# Patient Record
Sex: FEMALE | Race: WHITE | NOT HISPANIC OR LATINO | Employment: OTHER | URBAN - METROPOLITAN AREA
[De-identification: names, ages, dates, MRNs, and addresses within clinical notes are randomized per-mention and may not be internally consistent; named-entity substitution may affect disease eponyms.]

---

## 2017-12-04 ENCOUNTER — TRANSCRIBE ORDERS (OUTPATIENT)
Dept: LAB | Facility: HOSPITAL | Age: 82
End: 2017-12-04

## 2017-12-04 ENCOUNTER — LAB (OUTPATIENT)
Dept: LAB | Facility: HOSPITAL | Age: 82
End: 2017-12-04

## 2017-12-04 DIAGNOSIS — R73.9 BLOOD GLUCOSE ELEVATED: ICD-10-CM

## 2017-12-04 DIAGNOSIS — E78.2 MIXED HYPERLIPIDEMIA: ICD-10-CM

## 2017-12-04 DIAGNOSIS — I51.9 MYXEDEMA HEART DISEASE: ICD-10-CM

## 2017-12-04 DIAGNOSIS — E03.9 MYXEDEMA HEART DISEASE: ICD-10-CM

## 2017-12-04 DIAGNOSIS — E03.9 MYXEDEMA HEART DISEASE: Primary | ICD-10-CM

## 2017-12-04 DIAGNOSIS — I10 ESSENTIAL HYPERTENSION, MALIGNANT: ICD-10-CM

## 2017-12-04 DIAGNOSIS — I51.9 MYXEDEMA HEART DISEASE: Primary | ICD-10-CM

## 2017-12-04 LAB
HBA1C MFR BLD: 6.4 % (ref 4.8–5.6)
T4 FREE SERPL-MCNC: 1.29 NG/DL (ref 0.89–1.76)

## 2017-12-04 PROCEDURE — 83036 HEMOGLOBIN GLYCOSYLATED A1C: CPT | Performed by: INTERNAL MEDICINE

## 2017-12-04 PROCEDURE — 84439 ASSAY OF FREE THYROXINE: CPT | Performed by: INTERNAL MEDICINE

## 2017-12-04 PROCEDURE — 84480 ASSAY TRIIODOTHYRONINE (T3): CPT

## 2017-12-04 PROCEDURE — 36415 COLL VENOUS BLD VENIPUNCTURE: CPT | Performed by: INTERNAL MEDICINE

## 2017-12-05 LAB — T3 SERPL-MCNC: 65 NG/DL (ref 71–180)

## 2019-01-25 ENCOUNTER — LAB (OUTPATIENT)
Dept: LAB | Facility: HOSPITAL | Age: 84
End: 2019-01-25

## 2019-01-25 ENCOUNTER — OFFICE VISIT (OUTPATIENT)
Dept: INTERNAL MEDICINE | Facility: CLINIC | Age: 84
End: 2019-01-25

## 2019-01-25 VITALS
WEIGHT: 168.5 LBS | SYSTOLIC BLOOD PRESSURE: 168 MMHG | HEIGHT: 66 IN | HEART RATE: 60 BPM | DIASTOLIC BLOOD PRESSURE: 74 MMHG | BODY MASS INDEX: 27.08 KG/M2

## 2019-01-25 DIAGNOSIS — J44.9 MODERATE COPD (CHRONIC OBSTRUCTIVE PULMONARY DISEASE) (HCC): ICD-10-CM

## 2019-01-25 DIAGNOSIS — E03.9 ACQUIRED HYPOTHYROIDISM: ICD-10-CM

## 2019-01-25 DIAGNOSIS — I25.10 ASHD (ARTERIOSCLEROTIC HEART DISEASE): ICD-10-CM

## 2019-01-25 DIAGNOSIS — E78.00 HYPERCHOLESTEROLEMIA: ICD-10-CM

## 2019-01-25 DIAGNOSIS — K21.9 GASTROESOPHAGEAL REFLUX DISEASE WITHOUT ESOPHAGITIS: ICD-10-CM

## 2019-01-25 DIAGNOSIS — I10 BENIGN ESSENTIAL HYPERTENSION: Primary | ICD-10-CM

## 2019-01-25 DIAGNOSIS — I10 BENIGN ESSENTIAL HYPERTENSION: ICD-10-CM

## 2019-01-25 PROBLEM — M15.9 PRIMARY OSTEOARTHRITIS INVOLVING MULTIPLE JOINTS: Status: ACTIVE | Noted: 2019-01-25

## 2019-01-25 PROBLEM — I25.9 ISCHEMIC HEART DISEASE: Status: ACTIVE | Noted: 2019-01-25

## 2019-01-25 PROBLEM — D64.89 OTHER SPECIFIED ANEMIAS: Status: ACTIVE | Noted: 2019-01-25

## 2019-01-25 PROBLEM — K58.2 IRRITABLE BOWEL SYNDROME WITH CONSTIPATION AND DIARRHEA: Status: ACTIVE | Noted: 2019-01-25

## 2019-01-25 PROBLEM — N30.20 CHRONIC CYSTITIS: Status: ACTIVE | Noted: 2019-01-25

## 2019-01-25 PROBLEM — R54 ADVANCED AGE: Status: ACTIVE | Noted: 2019-01-25

## 2019-01-25 LAB
ALBUMIN SERPL-MCNC: 4.63 G/DL (ref 3.2–4.8)
ALBUMIN/GLOB SERPL: 2 G/DL (ref 1.5–2.5)
ALP SERPL-CCNC: 86 U/L (ref 25–100)
ALT SERPL W P-5'-P-CCNC: 29 U/L (ref 7–40)
ANION GAP SERPL CALCULATED.3IONS-SCNC: 7 MMOL/L (ref 3–11)
ARTICHOKE IGE QN: 104 MG/DL (ref 0–130)
AST SERPL-CCNC: 27 U/L (ref 0–33)
BACTERIA UR QL AUTO: ABNORMAL /HPF
BILIRUB SERPL-MCNC: 0.6 MG/DL (ref 0.3–1.2)
BILIRUB UR QL STRIP: NEGATIVE
BUN BLD-MCNC: 18 MG/DL (ref 9–23)
BUN/CREAT SERPL: 22 (ref 7–25)
CALCIUM SPEC-SCNC: 9.9 MG/DL (ref 8.7–10.4)
CHLORIDE SERPL-SCNC: 103 MMOL/L (ref 99–109)
CHOLEST SERPL-MCNC: 179 MG/DL (ref 0–200)
CLARITY UR: CLEAR
CO2 SERPL-SCNC: 28 MMOL/L (ref 20–31)
COLOR UR: YELLOW
CREAT BLD-MCNC: 0.82 MG/DL (ref 0.6–1.3)
DEPRECATED RDW RBC AUTO: 47 FL (ref 37–54)
ERYTHROCYTE [DISTWIDTH] IN BLOOD BY AUTOMATED COUNT: 12.9 % (ref 11.3–14.5)
GFR SERPL CREATININE-BSD FRML MDRD: 66 ML/MIN/1.73
GLOBULIN UR ELPH-MCNC: 2.3 GM/DL
GLUCOSE BLD-MCNC: 110 MG/DL (ref 70–100)
GLUCOSE UR STRIP-MCNC: NEGATIVE MG/DL
HCT VFR BLD AUTO: 39.3 % (ref 34.5–44)
HDLC SERPL-MCNC: 56 MG/DL (ref 40–60)
HGB BLD-MCNC: 12.9 G/DL (ref 11.5–15.5)
HGB UR QL STRIP.AUTO: NEGATIVE
HYALINE CASTS UR QL AUTO: ABNORMAL /LPF
KETONES UR QL STRIP: NEGATIVE
LEUKOCYTE ESTERASE UR QL STRIP.AUTO: ABNORMAL
MCH RBC QN AUTO: 32.7 PG (ref 27–31)
MCHC RBC AUTO-ENTMCNC: 32.8 G/DL (ref 32–36)
MCV RBC AUTO: 99.7 FL (ref 80–99)
NITRITE UR QL STRIP: NEGATIVE
PH UR STRIP.AUTO: 7 [PH] (ref 5–8)
PLATELET # BLD AUTO: 388 10*3/MM3 (ref 150–450)
PMV BLD AUTO: 10.5 FL (ref 6–12)
POTASSIUM BLD-SCNC: 5 MMOL/L (ref 3.5–5.5)
PROT SERPL-MCNC: 6.9 G/DL (ref 5.7–8.2)
PROT UR QL STRIP: NEGATIVE
RBC # BLD AUTO: 3.94 10*6/MM3 (ref 3.89–5.14)
RBC # UR: ABNORMAL /HPF
REF LAB TEST METHOD: ABNORMAL
SODIUM BLD-SCNC: 138 MMOL/L (ref 132–146)
SP GR UR STRIP: 1.01 (ref 1–1.03)
SQUAMOUS #/AREA URNS HPF: ABNORMAL /HPF
T4 FREE SERPL-MCNC: 1.38 NG/DL (ref 0.89–1.76)
TRIGL SERPL-MCNC: 153 MG/DL (ref 0–150)
TSH SERPL DL<=0.05 MIU/L-ACNC: 0.02 MIU/ML (ref 0.35–5.35)
UROBILINOGEN UR QL STRIP: ABNORMAL
WBC NRBC COR # BLD: 7.55 10*3/MM3 (ref 3.5–10.8)
WBC UR QL AUTO: ABNORMAL /HPF

## 2019-01-25 PROCEDURE — 85027 COMPLETE CBC AUTOMATED: CPT

## 2019-01-25 PROCEDURE — 84439 ASSAY OF FREE THYROXINE: CPT

## 2019-01-25 PROCEDURE — 84443 ASSAY THYROID STIM HORMONE: CPT

## 2019-01-25 PROCEDURE — 80061 LIPID PANEL: CPT

## 2019-01-25 PROCEDURE — 84481 FREE ASSAY (FT-3): CPT

## 2019-01-25 PROCEDURE — 87086 URINE CULTURE/COLONY COUNT: CPT

## 2019-01-25 PROCEDURE — 80053 COMPREHEN METABOLIC PANEL: CPT

## 2019-01-25 PROCEDURE — 81001 URINALYSIS AUTO W/SCOPE: CPT

## 2019-01-25 PROCEDURE — 36415 COLL VENOUS BLD VENIPUNCTURE: CPT

## 2019-01-25 PROCEDURE — G0439 PPPS, SUBSEQ VISIT: HCPCS | Performed by: INTERNAL MEDICINE

## 2019-01-25 RX ORDER — ATORVASTATIN CALCIUM 10 MG/1
10 TABLET, FILM COATED ORAL DAILY
COMMUNITY
End: 2019-01-25

## 2019-01-25 RX ORDER — LIOTHYRONINE SODIUM 5 UG/1
5 TABLET ORAL DAILY
COMMUNITY
End: 2019-05-03 | Stop reason: SDUPTHER

## 2019-01-25 RX ORDER — POTASSIUM CHLORIDE 750 MG/1
5 CAPSULE, EXTENDED RELEASE ORAL DAILY
COMMUNITY
End: 2020-01-27 | Stop reason: SDUPTHER

## 2019-01-25 RX ORDER — ASPIRIN 81 MG/1
81 TABLET ORAL DAILY
COMMUNITY
End: 2019-09-26

## 2019-01-25 RX ORDER — CARVEDILOL 12.5 MG/1
12.5 TABLET ORAL 2 TIMES DAILY WITH MEALS
COMMUNITY
End: 2019-04-16 | Stop reason: SDUPTHER

## 2019-01-25 RX ORDER — DOCUSATE SODIUM 100 MG/1
100 CAPSULE, LIQUID FILLED ORAL DAILY
COMMUNITY
End: 2020-12-17

## 2019-01-25 RX ORDER — GABAPENTIN 300 MG/1
300 CAPSULE ORAL 2 TIMES DAILY
COMMUNITY
End: 2019-02-04 | Stop reason: SDUPTHER

## 2019-01-25 RX ORDER — OCTISALATE, AVOBENZONE, HOMOSALATE, AND OCTOCRYLENE 29.4; 29.4; 49; 25.48 MG/ML; MG/ML; MG/ML; MG/ML
LOTION TOPICAL DAILY
COMMUNITY
End: 2019-09-26

## 2019-01-25 RX ORDER — SPIRONOLACTONE 25 MG/1
25 TABLET ORAL DAILY
COMMUNITY
End: 2019-01-30 | Stop reason: ALTCHOICE

## 2019-01-25 RX ORDER — FUROSEMIDE 20 MG/1
20 TABLET ORAL DAILY
COMMUNITY
End: 2019-11-21 | Stop reason: SDUPTHER

## 2019-01-25 RX ORDER — FUROSEMIDE 20 MG/1
20 TABLET ORAL DAILY
COMMUNITY
End: 2019-01-25

## 2019-01-25 RX ORDER — PANTOPRAZOLE SODIUM 40 MG/1
40 TABLET, DELAYED RELEASE ORAL DAILY
COMMUNITY
End: 2019-08-08 | Stop reason: SDUPTHER

## 2019-01-25 RX ORDER — TRIMETHOPRIM 100 MG/1
100 TABLET ORAL DAILY
COMMUNITY
End: 2019-01-30 | Stop reason: ALTCHOICE

## 2019-01-25 RX ORDER — LEVOTHYROXINE SODIUM 0.07 MG/1
75 TABLET ORAL DAILY
COMMUNITY
End: 2019-07-18 | Stop reason: SDUPTHER

## 2019-01-25 RX ORDER — LOSARTAN POTASSIUM 100 MG/1
100 TABLET ORAL DAILY
COMMUNITY
End: 2019-01-30 | Stop reason: ALTCHOICE

## 2019-01-25 RX ORDER — ATORVASTATIN CALCIUM 40 MG/1
40 TABLET, FILM COATED ORAL DAILY
COMMUNITY
End: 2019-05-15 | Stop reason: SDUPTHER

## 2019-01-25 RX ORDER — FERROUS SULFATE 325(65) MG
325 TABLET ORAL
COMMUNITY
End: 2019-01-30 | Stop reason: ALTCHOICE

## 2019-01-25 NOTE — PROGRESS NOTES
QUICK REFERENCE INFORMATION:  The ABCs of the Annual Wellness Visit    Subsequent Medicare Wellness Visit    HEALTH RISK ASSESSMENT    2/3/1930    Recent Hospitalizations:  No hospitalization(s) within the last year..        Current Medical Providers:  Patient Care Team:  Nj Mckeon MD as PCP - General  Nj Mckeon MD as PCP - Family Medicine        Smoking Status:  Social History     Tobacco Use   Smoking Status Former Smoker   Tobacco Comment    quit at approx age 60       Alcohol Consumption:  Social History     Substance and Sexual Activity   Alcohol Use Yes    Comment: rarely       Depression Screen:   PHQ-2/PHQ-9 Depression Screening 1/25/2019   Little interest or pleasure in doing things 0   Feeling down, depressed, or hopeless 0   Total Score 0       Health Habits and Functional and Cognitive Screening:  No flowsheet data found.        Does the patient have evidence of cognitive impairment? Yes    Aspirin use counseling: Taking ASA appropriately as indicated      Recent Lab Results:  CMP:  Lab Results   Component Value Date    BUN 23 12/01/2015    CREATININE 1.0 12/01/2015     12/01/2015    K 4.1 12/01/2015    CO2 30 12/01/2015    CALCIUM 10.0 12/01/2015    ALBUMIN 4.2 04/27/2016    BILITOT 0.5 04/27/2016    ALKPHOS 82 04/27/2016    AST 20 04/27/2016    ALT 20 04/27/2016     Lipid Panel:  Lab Results   Component Value Date    TRIG 158 (H) 04/27/2016    HDL 52 04/27/2016     HbA1c:  Lab Results   Component Value Date    HGBA1C 6.40 (H) 12/04/2017       Visual Acuity:  No exam data present    Age-appropriate Screening Schedule:  Refer to the list below for future screening recommendations based on patient's age, sex and/or medical conditions. Orders for these recommended tests are listed in the plan section. The patient has been provided with a written plan.    Health Maintenance   Topic Date Due   • TDAP/TD VACCINES (1 - Tdap) 02/03/1949   • ZOSTER VACCINE (1 of 2) 02/03/1980   • INFLUENZA  VACCINE  08/01/2018   • LIPID PANEL  01/25/2019   • DXA SCAN  01/25/2019   • PNEUMOCOCCAL VACCINES (65+ LOW/MEDIUM RISK)  Completed        Subjective   History of Present Illness    Amparo Valdes is a 88 y.o. female who presents for an Subsequent Wellness Visit.    The following portions of the patient's history were reviewed and updated as appropriate: She  has a past medical history of Arthritis, Cancer, skin, squamous cell (12/27/2012), Colon polyps (1987), COPD (chronic obstructive pulmonary disease) (CMS/[facility]), Family history of coronary artery disease, Family history of hyperlipidemia, Family history of hypertension, Family history of stroke, Femoral hernia of right side (12/13/2011), Hyperlipidemia, Hypertension, Hypothyroidism, Osteoarthritis, Osteoporosis, Pneumonia, Rectal fistula, SBO (small bowel obstruction) (CMS/[facility]) (09/13/2012), and Thyroid disease..    Outpatient Medications Prior to Visit   Medication Sig Dispense Refill   • aspirin 81 MG EC tablet Take 81 mg by mouth Daily.     • atorvastatin (LIPITOR) 40 MG tablet Take 40 mg by mouth Daily.     • calcium carbonate-vitamin d (CALTRATE 600+D) 600-400 MG-UNIT per tablet Take 1 tablet by mouth Daily.     • carvedilol (COREG) 12.5 MG tablet Take 12.5 mg by mouth 2 (Two) Times a Day With Meals.     • docusate sodium (COLACE) 100 MG capsule Take 100 mg by mouth 2 (Two) Times a Day As Needed for Constipation.     • ferrous sulfate 325 (65 FE) MG tablet Take 325 mg by mouth Daily With Breakfast. One every Monday, Wednesday and Friday     • furosemide (LASIX) 20 MG tablet Take 20 mg by mouth. TAKE MTW     • gabapentin (NEURONTIN) 300 MG capsule Take 300 mg by mouth 2 (Two) Times a Day.     • levothyroxine (SYNTHROID, LEVOTHROID) 75 MCG tablet Take 75 mcg by mouth Daily.     • liothyronine (CYTOMEL) 5 MCG tablet Take 5 mcg by mouth Daily.     • losartan (COZAAR) 100 MG tablet Take 100 mg by mouth Daily.     • Multiple Vitamins-Minerals (CENTRUM SILVER PO)  Take 1 tablet by mouth Daily.     • O2 (OXYGEN) Inhale Daily As Needed.     • pantoprazole (PROTONIX) 40 MG EC tablet Take 40 mg by mouth Daily.     • polyethyl glycol-propyl glycol (SYSTANE) 0.4-0.3 % solution ophthalmic solution Administer 1 drop to both eyes 2 (Two) Times a Day.     • potassium chloride (MICRO-K) 10 MEQ CR capsule Take 5 mEq by mouth Daily.     • Probiotic Product (ALIGN) 4 MG capsule Take  by mouth Daily.     • spironolactone (ALDACTONE) 25 MG tablet Take 25 mg by mouth Daily.     • trimethoprim (TRIMPEX) 100 MG tablet Take 100 mg by mouth Daily.     • atorvastatin (LIPITOR) 10 MG tablet Take 10 mg by mouth Daily.     • furosemide (LASIX) 20 MG tablet Take 20 mg by mouth Daily.       No facility-administered medications prior to visit.        Patient Active Problem List   Diagnosis   • Irritable bowel syndrome with constipation and diarrhea   • Chronic cystitis   • Benign essential hypertension   • ASHD (arteriosclerotic heart disease)   • Moderate COPD (chronic obstructive pulmonary disease) (CMS/[facility])   • Acquired hypothyroidism   • Hypercholesterolemia   • Other specified anemias   • Primary osteoarthritis involving multiple joints   • Advanced age   • Ischemic heart disease   • Gastroesophageal reflux disease without esophagitis       Advance Care Planning:  has an advance directive - a copy HAS NOT been provided    Identification of Risk Factors:  Risk factors include: none.    Review of Systems   Constitutional: Negative.    HENT: Positive for hearing loss and sinus pressure.    Respiratory: Positive for cough and wheezing.    Cardiovascular: Negative.    Gastrointestinal: Negative.    Endocrine: Negative.    Genitourinary: Positive for urgency.   Musculoskeletal: Positive for joint swelling.   Skin: Negative.    Allergic/Immunologic: Negative.    Neurological: Negative.    Hematological: Negative.    Psychiatric/Behavioral: Negative.        Compared to one year ago, the patient feels her  "physical health is better.  Compared to one year ago, the patient feels her mental health is better.    Objective     Physical Exam   Patient is a 88-year-old female is awake alert and oriented ×3 blood pressure initially 168/74 repeated 140/72 left and right sitting respirations 14 heart rate is 60/m  HEENT clear no facial asymmetry mild hearing loss  Neck without thyromegaly.  Neck pain distention  Chest distant but clear without rales wheezes or rhonchi  Cardiac is regular rhythm with no gallop rub click or murmur  Abdomen is supple without tenderness or masses liver spleen are normal positive bowel sounds   deferred  Extremities warm and dry no edema mild arthritic changes in both knees low back and hips  Skin mild venous stasis lower legs  CNS is intact    Patient Wellness Counseling:   Plan of care reviewed with patient at the conclusion of today's visit. Education was provided in regards to diagnosis, diet and exercise, cervical cancer screening, self breast exams, breast cancer screening, and the importance of yearly mammograms.   Nutrition, family planning/contraception, physical activity, healthy weight,ways to reduce stress, adequate sleep, injury prevention, misuse of tobacco, alcohol and drugs, sexual behavior and STD's, dental health, mental health, and immunizations.    Management and any prescribed or recommended OTC medications.  Patient verbalizes understanding of and agreement with management plan.    .Procdoc   EKG sinus bradycardia with first-degree AV block left axis deviation and LVH.    Vitals:    01/25/19 1018   BP: 168/74   BP Location: Left arm   Pulse: 60   Weight: 76.4 kg (168 lb 8 oz)   Height: 167 cm (65.75\")       Patient's Body mass index is 27.4 kg/m². BMI is within normal parameters. No follow-up required..      Assessment/Plan    #1 essential hypertension on therapy no change  #2 L chronic heart disease with coronary disease remote myocardial infarction with stenting currently " stable without chest pain pressure palpitations  #3 mixed hyperlipidemia stable on Lipitor 40 fasting lab pending  #4 degenerative arthritis disease multiple joints currently stable  #5 hypothyroidism stable on therapy passing lab is pending  #6 GERD  #7 hypoxia on O2    Await fasting lab  EKG discussed  Continue all therapy  See the patient back in 6 months or when necessary.  Patient Self-Management and Personalized Health Advice  The patient has been provided with information about: exercise and preventive services including:   · Fall Risk plan of care done.    Visit Diagnoses:    ICD-10-CM ICD-9-CM   1. Benign essential hypertension I10 401.1   2. ASHD (arteriosclerotic heart disease) I25.10 414.00   3. Hypercholesterolemia E78.00 272.0   4. Moderate COPD (chronic obstructive pulmonary disease) (CMS/[facility]) J44.9 496   5. Gastroesophageal reflux disease without esophagitis K21.9 530.81   6. Acquired hypothyroidism E03.9 244.9       Orders Placed This Encounter   Procedures   • CBC No Differential     Standing Status:   Future     Number of Occurrences:   1     Standing Expiration Date:   1/25/2020   • Comprehensive metabolic panel     Standing Status:   Future     Number of Occurrences:   1     Standing Expiration Date:   1/25/2020   • Lipid panel     Standing Status:   Future     Number of Occurrences:   1     Standing Expiration Date:   1/25/2020   • TSH     Standing Status:   Future     Number of Occurrences:   1     Standing Expiration Date:   1/25/2020   • T3, free     Standing Status:   Future     Number of Occurrences:   1     Standing Expiration Date:   1/25/2020   • T4, free     Standing Status:   Future     Number of Occurrences:   1     Standing Expiration Date:   1/25/2020   • Urinalysis With Culture If Indicated - Urine, Clean Catch     Standing Status:   Future     Number of Occurrences:   1     Standing Expiration Date:   1/25/2020       Outpatient Encounter Medications as of 1/25/2019   Medication  Sig Dispense Refill   • aspirin 81 MG EC tablet Take 81 mg by mouth Daily.     • atorvastatin (LIPITOR) 40 MG tablet Take 40 mg by mouth Daily.     • calcium carbonate-vitamin d (CALTRATE 600+D) 600-400 MG-UNIT per tablet Take 1 tablet by mouth Daily.     • carvedilol (COREG) 12.5 MG tablet Take 12.5 mg by mouth 2 (Two) Times a Day With Meals.     • docusate sodium (COLACE) 100 MG capsule Take 100 mg by mouth 2 (Two) Times a Day As Needed for Constipation.     • ferrous sulfate 325 (65 FE) MG tablet Take 325 mg by mouth Daily With Breakfast. One every Monday, Wednesday and Friday     • furosemide (LASIX) 20 MG tablet Take 20 mg by mouth. TAKE MTW     • gabapentin (NEURONTIN) 300 MG capsule Take 300 mg by mouth 2 (Two) Times a Day.     • levothyroxine (SYNTHROID, LEVOTHROID) 75 MCG tablet Take 75 mcg by mouth Daily.     • liothyronine (CYTOMEL) 5 MCG tablet Take 5 mcg by mouth Daily.     • losartan (COZAAR) 100 MG tablet Take 100 mg by mouth Daily.     • Multiple Vitamins-Minerals (CENTRUM SILVER PO) Take 1 tablet by mouth Daily.     • O2 (OXYGEN) Inhale Daily As Needed.     • pantoprazole (PROTONIX) 40 MG EC tablet Take 40 mg by mouth Daily.     • polyethyl glycol-propyl glycol (SYSTANE) 0.4-0.3 % solution ophthalmic solution Administer 1 drop to both eyes 2 (Two) Times a Day.     • potassium chloride (MICRO-K) 10 MEQ CR capsule Take 5 mEq by mouth Daily.     • Probiotic Product (ALIGN) 4 MG capsule Take  by mouth Daily.     • spironolactone (ALDACTONE) 25 MG tablet Take 25 mg by mouth Daily.     • trimethoprim (TRIMPEX) 100 MG tablet Take 100 mg by mouth Daily.     • [DISCONTINUED] atorvastatin (LIPITOR) 10 MG tablet Take 10 mg by mouth Daily.     • [DISCONTINUED] furosemide (LASIX) 20 MG tablet Take 20 mg by mouth Daily.       No facility-administered encounter medications on file as of 1/25/2019.        Reviewed use of high risk medication in the elderly: yes  Reviewed for potential of harmful drug interactions  in the elderly: yes    Follow Up:  Return in about 6 months (around 7/25/2019) for Recheck.     An After Visit Summary and PPPS with all of these plans were given to the patient.

## 2019-01-25 NOTE — PATIENT INSTRUCTIONS
Discussed diet and exercise  Discussed EKG with no change from previous  Fasting lab is pending  To continue all treatment and therapy including oxygen and medications  See the patient back in 6 months or when necessary

## 2019-01-27 LAB
BACTERIA SPEC AEROBE CULT: NORMAL
T3FREE SERPL-MCNC: 2.6 PG/ML (ref 2–4.4)

## 2019-01-30 ENCOUNTER — TELEPHONE (OUTPATIENT)
Dept: INTERNAL MEDICINE | Facility: CLINIC | Age: 84
End: 2019-01-30

## 2019-01-30 NOTE — TELEPHONE ENCOUNTER
Accurate med list received from patient and our chart is updated.  J[facility] reviewed and approved.  Removed from our med list is: losartan, spironolactone, trimethoprim, and iron.

## 2019-02-04 RX ORDER — GABAPENTIN 300 MG/1
CAPSULE ORAL
Qty: 180 CAPSULE | Refills: 0 | Status: SHIPPED | OUTPATIENT
Start: 2019-02-04 | End: 2019-05-03 | Stop reason: SDUPTHER

## 2019-02-04 NOTE — TELEPHONE ENCOUNTER
Refill on Gabapentin 300mg    Please see is she needs other refills as well.    Send to pharm on file

## 2019-02-13 ENCOUNTER — OFFICE VISIT (OUTPATIENT)
Dept: INTERNAL MEDICINE | Facility: CLINIC | Age: 84
End: 2019-02-13

## 2019-02-13 ENCOUNTER — HOSPITAL ENCOUNTER (OUTPATIENT)
Dept: GENERAL RADIOLOGY | Facility: HOSPITAL | Age: 84
Discharge: HOME | End: 2019-02-13
Admitting: PHYSICIAN ASSISTANT

## 2019-02-13 ENCOUNTER — APPOINTMENT (OUTPATIENT)
Dept: LAB | Facility: HOSPITAL | Age: 84
End: 2019-02-13

## 2019-02-13 VITALS
BODY MASS INDEX: 26.83 KG/M2 | HEART RATE: 60 BPM | WEIGHT: 165 LBS | SYSTOLIC BLOOD PRESSURE: 140 MMHG | TEMPERATURE: 98 F | DIASTOLIC BLOOD PRESSURE: 88 MMHG

## 2019-02-13 DIAGNOSIS — J40 BRONCHITIS: Primary | ICD-10-CM

## 2019-02-13 PROBLEM — I51.9 HEART DISORDER: Status: ACTIVE | Noted: 2019-02-13

## 2019-02-13 PROBLEM — M19.90 OSTEOARTHRITIS: Status: ACTIVE | Noted: 2019-02-13

## 2019-02-13 PROBLEM — N39.0 CHRONIC UTI: Status: ACTIVE | Noted: 2019-02-13

## 2019-02-13 PROBLEM — I25.810 ARTERIOSCLEROSIS OF CORONARY ARTERY BYPASS GRAFT: Status: ACTIVE | Noted: 2019-02-13

## 2019-02-13 PROBLEM — I50.9 CONGENITAL HEART FAILURE (HCC): Status: ACTIVE | Noted: 2019-02-13

## 2019-02-13 PROBLEM — R53.1 ASTHENIA: Status: ACTIVE | Noted: 2019-02-13

## 2019-02-13 PROBLEM — D50.9 IRON DEFICIENCY ANEMIA: Status: ACTIVE | Noted: 2019-02-13

## 2019-02-13 PROBLEM — I50.9 CHF (CONGESTIVE HEART FAILURE): Status: ACTIVE | Noted: 2019-02-13

## 2019-02-13 PROBLEM — G47.50 PARASOMNIA: Status: ACTIVE | Noted: 2019-02-13

## 2019-02-13 PROBLEM — B02.9 HERPES ZOSTER: Status: ACTIVE | Noted: 2019-02-13

## 2019-02-13 PROBLEM — E78.2 MIXED HYPERLIPIDEMIA: Status: ACTIVE | Noted: 2019-02-13

## 2019-02-13 PROBLEM — I44.0 FIRST DEGREE ATRIOVENTRICULAR BLOCK: Status: ACTIVE | Noted: 2019-02-13

## 2019-02-13 PROBLEM — I95.9 LOW BLOOD PRESSURE: Status: ACTIVE | Noted: 2019-02-13

## 2019-02-13 PROBLEM — E87.1 HYPONATREMIA: Status: ACTIVE | Noted: 2019-02-13

## 2019-02-13 PROBLEM — I50.9 CONGENITAL HEART FAILURE: Status: RESOLVED | Noted: 2019-02-13 | Resolved: 2019-02-13

## 2019-02-13 PROBLEM — N34.2 URETHRITIS: Status: ACTIVE | Noted: 2019-02-13

## 2019-02-13 PROBLEM — K41.90 FEMORAL HERNIA: Status: ACTIVE | Noted: 2019-02-13

## 2019-02-13 LAB
BASOPHILS # BLD AUTO: 0.04 10*3/MM3 (ref 0–0.2)
BASOPHILS NFR BLD AUTO: 0.3 % (ref 0–1)
DEPRECATED RDW RBC AUTO: 45.5 FL (ref 37–54)
EOSINOPHIL # BLD AUTO: 0.16 10*3/MM3 (ref 0–0.3)
EOSINOPHIL NFR BLD AUTO: 1.2 % (ref 0–3)
ERYTHROCYTE [DISTWIDTH] IN BLOOD BY AUTOMATED COUNT: 12.8 % (ref 11.3–14.5)
HCT VFR BLD AUTO: 37.9 % (ref 34.5–44)
HGB BLD-MCNC: 12.7 G/DL (ref 11.5–15.5)
IMM GRANULOCYTES # BLD AUTO: 0.03 10*3/MM3 (ref 0–0.05)
IMM GRANULOCYTES NFR BLD AUTO: 0.2 % (ref 0–0.6)
LYMPHOCYTES # BLD AUTO: 0.99 10*3/MM3 (ref 0.6–4.8)
LYMPHOCYTES NFR BLD AUTO: 7.6 % (ref 24–44)
MCH RBC QN AUTO: 32.6 PG (ref 27–31)
MCHC RBC AUTO-ENTMCNC: 33.5 G/DL (ref 32–36)
MCV RBC AUTO: 97.4 FL (ref 80–99)
MONOCYTES # BLD AUTO: 1.55 10*3/MM3 (ref 0–1)
MONOCYTES NFR BLD AUTO: 11.9 % (ref 0–12)
NEUTROPHILS # BLD AUTO: 10.3 10*3/MM3 (ref 1.5–8.3)
NEUTROPHILS NFR BLD AUTO: 78.8 % (ref 41–71)
PLATELET # BLD AUTO: 340 10*3/MM3 (ref 150–450)
PMV BLD AUTO: 10.6 FL (ref 6–12)
RBC # BLD AUTO: 3.89 10*6/MM3 (ref 3.89–5.14)
WBC NRBC COR # BLD: 13.07 10*3/MM3 (ref 3.5–10.8)

## 2019-02-13 PROCEDURE — 99213 OFFICE O/P EST LOW 20 MIN: CPT | Performed by: PHYSICIAN ASSISTANT

## 2019-02-13 PROCEDURE — 71046 X-RAY EXAM CHEST 2 VIEWS: CPT

## 2019-02-13 PROCEDURE — 85025 COMPLETE CBC W/AUTO DIFF WBC: CPT | Performed by: PHYSICIAN ASSISTANT

## 2019-02-13 PROCEDURE — 36415 COLL VENOUS BLD VENIPUNCTURE: CPT | Performed by: PHYSICIAN ASSISTANT

## 2019-02-13 RX ORDER — CEFDINIR 300 MG/1
300 CAPSULE ORAL 2 TIMES DAILY
Qty: 20 CAPSULE | Refills: 0 | Status: SHIPPED | OUTPATIENT
Start: 2019-02-13 | End: 2019-05-20

## 2019-02-13 RX ORDER — TRIMETHOPRIM 100 MG/1
TABLET ORAL
COMMUNITY
End: 2020-01-27 | Stop reason: SDUPTHER

## 2019-02-13 RX ORDER — INHALER, ASSIST DEVICES
1 SPACER (EA) MISCELLANEOUS 4 TIMES DAILY
Qty: 1 DEVICE | Refills: 0 | Status: SHIPPED | OUTPATIENT
Start: 2019-02-13 | End: 2019-05-20

## 2019-02-13 RX ORDER — ALBUTEROL SULFATE 90 UG/1
2 AEROSOL, METERED RESPIRATORY (INHALATION) EVERY 4 HOURS PRN
Qty: 1 INHALER | Refills: 2 | Status: SHIPPED | OUTPATIENT
Start: 2019-02-13 | End: 2019-05-20

## 2019-02-13 RX ORDER — IPRATROPIUM BROMIDE 21 UG/1
SPRAY, METERED NASAL
COMMUNITY
Start: 2015-03-20 | End: 2019-05-20

## 2019-02-13 NOTE — PROGRESS NOTES
Patient Care Team:  Nj Mckeon MD as PCP - General  Nj Mckeon MD as PCP - Family Medicine    Chief Complaint;:   Chief Complaint   Patient presents with   • Cough     started Sunday   • URI     started Sunday   • Nasal Congestion     started Sunday   • Shortness of Breath   • Wheezing        Subjective     HPI    Past Medical History:   Diagnosis Date   • Arthritis    • Cancer, skin, squamous cell 12/27/2012   • Colon polyps 1987    clear 1994   • COPD (chronic obstructive pulmonary disease) (CMS/[facility])    • Family history of coronary artery disease    • Family history of hyperlipidemia    • Family history of hypertension    • Family history of stroke    • Femoral hernia of right side 12/13/2011   • Hyperlipidemia    • Hypertension    • Hypothyroidism    • Osteoarthritis    • Osteoporosis    • Pneumonia    • Rectal fistula     repair   • SBO (small bowel obstruction) (CMS/[facility]) 09/13/2012    Had right inguinal hernia repair   • Sepsis (CMS/HCC) 04/2014    - Drug therapy    • Stress incontinence     A and P  repair   • Thyroid disease        Social History     Socioeconomic History   • Marital status:      Spouse name: Not on file   • Number of children: Not on file   • Years of education: Not on file   • Highest education level: Not on file   Social Needs   • Financial resource strain: Not on file   • Food insecurity - worry: Not on file   • Food insecurity - inability: Not on file   • Transportation needs - medical: Not on file   • Transportation needs - non-medical: Not on file   Occupational History   • Not on file   Tobacco Use   • Smoking status: Former Smoker   • Tobacco comment: quit at approx age 60   Substance and Sexual Activity   • Alcohol use: Yes     Comment: rarely   • Drug use: No   • Sexual activity: Not on file   Other Topics Concern   • Not on file   Social History Narrative   • Not on file       Allergies   Allergen Reactions   • Acetaminophen Unknown (See Comments)     Darvocet-  N100   • Baycol [Cerivastatin] Unknown (See Comments)     unknown   • Ciprofloxacin Unknown (See Comments)     Unknown   • Ciprofloxacin Hcl Unknown (See Comments)     unknown   • Codeine Unknown (See Comments)     unknown   • Fosamax [Alendronate Sodium] Unknown (See Comments)     unknown   • Meperidine Unknown (See Comments)     unknown   • Methadone Unknown (See Comments)     unknown   • Morphine Unknown (See Comments)     unknown   • Naloxone Unknown (See Comments)     unknown   • Propoxyphene Unknown (See Comments)     darvocet-n 100   • Sulfa Antibiotics Unknown (See Comments)     unknown   • Ubidecarenone GI Intolerance   • Zocor [Simvastatin] Unknown (See Comments)     unknown       Review of Systems:     Review of Systems   Constitutional: Positive for fatigue. Negative for chills and fever.   HENT: Positive for congestion and hearing loss.    Respiratory: Positive for cough, chest tightness, shortness of breath and wheezing.    Cardiovascular: Negative for chest pain and palpitations.   Gastrointestinal: Negative for abdominal pain.       Vital Signs  Vitals:    02/13/19 1116   BP: 140/88   BP Location: Left arm   Patient Position: Sitting   Cuff Size: Adult   Pulse: 60   Temp: 98 °F (36.7 °C)   TempSrc: Temporal   Weight: 74.8 kg (165 lb)         Current Outpatient Medications:   •  aspirin 81 MG EC tablet, Take 81 mg by mouth Daily., Disp: , Rfl:   •  atorvastatin (LIPITOR) 40 MG tablet, Take 40 mg by mouth Daily., Disp: , Rfl:   •  calcium carbonate-vitamin d (CALTRATE 600+D) 600-400 MG-UNIT per tablet, Take 1 tablet by mouth Daily., Disp: , Rfl:   •  carvedilol (COREG) 12.5 MG tablet, Take 12.5 mg by mouth 2 (Two) Times a Day With Meals., Disp: , Rfl:   •  docusate sodium (COLACE) 100 MG capsule, Take 100 mg by mouth 2 (Two) Times a Day As Needed for Constipation., Disp: , Rfl:   •  furosemide (LASIX) 20 MG tablet, Take 20 mg by mouth. TAKE MTW, Disp: , Rfl:   •  gabapentin (NEURONTIN) 300 MG capsule,  take 1 capsule by mouth twice a day, Disp: 180 capsule, Rfl: 0  •  ipratropium (ATROVENT) 0.03 % nasal spray, Atrovent 0.03 % nasal spray  Daily, Disp: , Rfl:   •  levothyroxine (SYNTHROID, LEVOTHROID) 75 MCG tablet, Take 75 mcg by mouth Daily., Disp: , Rfl:   •  liothyronine (CYTOMEL) 5 MCG tablet, Take 5 mcg by mouth Daily., Disp: , Rfl:   •  Multiple Vitamins-Minerals (CENTRUM SILVER PO), Take 1 tablet by mouth Daily., Disp: , Rfl:   •  O2 (OXYGEN), Inhale Daily As Needed., Disp: , Rfl:   •  pantoprazole (PROTONIX) 40 MG EC tablet, Take 40 mg by mouth Daily., Disp: , Rfl:   •  polyethyl glycol-propyl glycol (SYSTANE) 0.4-0.3 % solution ophthalmic solution, Administer 1 drop to both eyes 2 (Two) Times a Day., Disp: , Rfl:   •  potassium chloride (MICRO-K) 10 MEQ CR capsule, Take 5 mEq by mouth Daily., Disp: , Rfl:   •  Probiotic Product (ALIGN) 4 MG capsule, Take  by mouth Daily., Disp: , Rfl:   •  trimethoprim (TRIMPEX) 100 MG tablet, trimethoprim 100 mg tablet  TAKE 1 TABLET BY MOUTH EVERY DAY, Disp: , Rfl:     Physical Exam:    Physical Exam    Procedures      Assessment/Plan   Problem List Items Addressed This Visit     None        There are no Patient Instructions on file for this visit.    Plan of care reviewed with patient at the conclusion of today's visit. Education was provided regarding diagnosis, management, and any prescribed or recommended OTC medications.Patient verbalizes understanding of and agreement with management plan.     Carlee Shahid PA-C

## 2019-02-13 NOTE — PROGRESS NOTES
Patient Care Team:  Nj Mckeon MD as PCP - General  Nj Mckeon MD as PCP - Family Medicine    Chief Complaint;:   Chief Complaint   Patient presents with   • Cough     started Sunday   • URI     started Sunday   • Nasal Congestion     started Sunday   • Shortness of Breath   • Wheezing        Subjective     HPI  89-year-old white female presents to the office today with chest congestion, nonproductive cough, and wheezing.  Her symptoms started 3 days ago.  She has had some sore throat, nasal congestion and headache as well.  No fever or chills.  She does use oxygen at home.  She did have red stools this morning but she thinks it's a result of the Robitussin.  She does have a history of anemia.  Past Medical History:   Diagnosis Date   • Arthritis    • Cancer, skin, squamous cell 12/27/2012   • Colon polyps 1987    clear 1994   • COPD (chronic obstructive pulmonary disease) (CMS/[facility])    • Family history of coronary artery disease    • Family history of hyperlipidemia    • Family history of hypertension    • Family history of stroke    • Femoral hernia of right side 12/13/2011   • Hyperlipidemia    • Hypertension    • Hypothyroidism    • Osteoarthritis    • Osteoporosis    • Pneumonia    • Rectal fistula     repair   • SBO (small bowel obstruction) (CMS/[facility]) 09/13/2012    Had right inguinal hernia repair   • Sepsis (CMS/[facility]) 04/2014    - Drug therapy    • Stress incontinence     A and P  repair   • Thyroid disease        Social History     Socioeconomic History   • Marital status:      Spouse name: Not on file   • Number of children: Not on file   • Years of education: Not on file   • Highest education level: Not on file   Social Needs   • Financial resource strain: Not on file   • Food insecurity - worry: Not on file   • Food insecurity - inability: Not on file   • Transportation needs - medical: Not on file   • Transportation needs - non-medical: Not on file   Occupational History   • Not on file    Tobacco Use   • Smoking status: Former Smoker   • Tobacco comment: quit at approx age 60   Substance and Sexual Activity   • Alcohol use: Yes     Comment: rarely   • Drug use: No   • Sexual activity: Not on file   Other Topics Concern   • Not on file   Social History Narrative   • Not on file       Allergies   Allergen Reactions   • Acetaminophen Unknown (See Comments)     Darvocet- N100   • Baycol [Cerivastatin] Unknown (See Comments)     unknown   • Ciprofloxacin Unknown (See Comments)     Unknown   • Ciprofloxacin Hcl Unknown (See Comments)     unknown   • Codeine Unknown (See Comments)     unknown   • Fosamax [Alendronate Sodium] Unknown (See Comments)     unknown   • Meperidine Unknown (See Comments)     unknown   • Methadone Unknown (See Comments)     unknown   • Morphine Unknown (See Comments)     unknown   • Naloxone Unknown (See Comments)     unknown   • Propoxyphene Unknown (See Comments)     darvocet-n 100   • Sulfa Antibiotics Unknown (See Comments)     unknown   • Ubidecarenone GI Intolerance   • Zocor [Simvastatin] Unknown (See Comments)     unknown       Review of Systems:     Review of Systems   Constitutional: Positive for fatigue. Negative for chills and fever.   HENT: Positive for postnasal drip and sore throat.    Respiratory: Positive for cough, shortness of breath and wheezing.    Cardiovascular: Negative.        Vital Signs  Vitals:    02/13/19 1116   BP: 140/88   BP Location: Left arm   Patient Position: Sitting   Cuff Size: Adult   Pulse: 60   Temp: 98 °F (36.7 °C)   TempSrc: Temporal   Weight: 74.8 kg (165 lb)         Current Outpatient Medications:   •  aspirin 81 MG EC tablet, Take 81 mg by mouth Daily., Disp: , Rfl:   •  atorvastatin (LIPITOR) 40 MG tablet, Take 40 mg by mouth Daily., Disp: , Rfl:   •  calcium carbonate-vitamin d (CALTRATE 600+D) 600-400 MG-UNIT per tablet, Take 1 tablet by mouth Daily., Disp: , Rfl:   •  carvedilol (COREG) 12.5 MG tablet, Take 12.5 mg by mouth 2 (Two)  Times a Day With Meals., Disp: , Rfl:   •  docusate sodium (COLACE) 100 MG capsule, Take 100 mg by mouth 2 (Two) Times a Day As Needed for Constipation., Disp: , Rfl:   •  furosemide (LASIX) 20 MG tablet, Take 20 mg by mouth. TAKE MTW, Disp: , Rfl:   •  gabapentin (NEURONTIN) 300 MG capsule, take 1 capsule by mouth twice a day, Disp: 180 capsule, Rfl: 0  •  ipratropium (ATROVENT) 0.03 % nasal spray, Atrovent 0.03 % nasal spray  Daily, Disp: , Rfl:   •  levothyroxine (SYNTHROID, LEVOTHROID) 75 MCG tablet, Take 75 mcg by mouth Daily., Disp: , Rfl:   •  liothyronine (CYTOMEL) 5 MCG tablet, Take 5 mcg by mouth Daily., Disp: , Rfl:   •  Multiple Vitamins-Minerals (CENTRUM SILVER PO), Take 1 tablet by mouth Daily., Disp: , Rfl:   •  O2 (OXYGEN), Inhale Daily As Needed., Disp: , Rfl:   •  pantoprazole (PROTONIX) 40 MG EC tablet, Take 40 mg by mouth Daily., Disp: , Rfl:   •  polyethyl glycol-propyl glycol (SYSTANE) 0.4-0.3 % solution ophthalmic solution, Administer 1 drop to both eyes 2 (Two) Times a Day., Disp: , Rfl:   •  potassium chloride (MICRO-K) 10 MEQ CR capsule, Take 5 mEq by mouth Daily., Disp: , Rfl:   •  Probiotic Product (ALIGN) 4 MG capsule, Take  by mouth Daily., Disp: , Rfl:   •  trimethoprim (TRIMPEX) 100 MG tablet, trimethoprim 100 mg tablet  TAKE 1 TABLET BY MOUTH EVERY DAY, Disp: , Rfl:   •  albuterol sulfate  (90 Base) MCG/ACT inhaler, Inhale 2 puffs Every 4 (Four) Hours As Needed for Wheezing., Disp: 1 inhaler, Rfl: 2  •  cefdinir (OMNICEF) 300 MG capsule, Take 1 capsule by mouth 2 (Two) Times a Day., Disp: 20 capsule, Rfl: 0  •  Spacer/Aero-Holding Chambers (AEROCHAMBER MINI CHAMBER) device, 1 applicator 4 (Four) Times a Day. Needs Aerochamber to use with albuterol inhaler, Disp: 1 Device, Rfl: 0    Physical Exam:    Physical Exam   Constitutional: She is oriented to person, place, and time. She appears well-developed and well-nourished.   HENT:   Head: Normocephalic and atraumatic.   Throat  erythematous   Neck: Normal range of motion. Neck supple.   Cardiovascular: Normal rate, regular rhythm and normal heart sounds.   Pulmonary/Chest: Effort normal.   Diffuse rales with expiratory wheezing   Lymphadenopathy:     She has no cervical adenopathy.   Neurological: She is alert and oriented to person, place, and time.   Nursing note and vitals reviewed.      Procedures      Assessment/Plan   Problem List Items Addressed This Visit     None      Visit Diagnoses     Bronchitis    -  Primary    PA and lateral chest x-ray today.  Check CBC.    Relevant Medications    ipratropium (ATROVENT) 0.03 % nasal spray    albuterol sulfate  (90 Base) MCG/ACT inhaler    Other Relevant Orders    CBC w AUTO Differential    XR Chest PA & Lateral        Patient Instructions   Cefdinir twice a day for 10 days.  PA and lateral chest x-ray today.  She will use albuterol 2 puffs every 4-6 hours as needed.  An AeroChamber was also sent in.  She is to call if not improving in the next 2-3 days and instructed to go to the ER for increased symptoms.    I'll also check a CBC to make sure her blood counts are stable.      Plan of care reviewed with patient at the conclusion of today's visit. Education was provided regarding diagnosis, management, and any prescribed or recommended OTC medications.Patient verbalizes understanding of and agreement with management plan.     Carlee Shahid PA-C

## 2019-02-13 NOTE — PATIENT INSTRUCTIONS
Cefdinir twice a day for 10 days.  PA and lateral chest x-ray today.  She will use albuterol 2 puffs every 4-6 hours as needed.  An AeroChamber was also sent in.  She is to call if not improving in the next 2-3 days and instructed to go to the ER for increased symptoms.    I'll also check a CBC to make sure her blood counts are stable.

## 2019-02-14 NOTE — PROGRESS NOTES
Tell her if symptoms worsen-go to Er.  Hopefully, she will start to feel better in the next 1-2 days

## 2019-04-16 RX ORDER — CARVEDILOL 12.5 MG/1
TABLET ORAL
Qty: 180 TABLET | Refills: 3 | Status: SHIPPED | OUTPATIENT
Start: 2019-04-16 | End: 2019-05-23 | Stop reason: SDUPTHER

## 2019-05-03 ENCOUNTER — TELEPHONE (OUTPATIENT)
Dept: INTERNAL MEDICINE | Facility: CLINIC | Age: 84
End: 2019-05-03

## 2019-05-03 RX ORDER — GABAPENTIN 300 MG/1
300 CAPSULE ORAL 2 TIMES DAILY
Qty: 180 CAPSULE | Refills: 0 | Status: SHIPPED | OUTPATIENT
Start: 2019-05-03 | End: 2019-08-05 | Stop reason: SDUPTHER

## 2019-05-03 RX ORDER — LIOTHYRONINE SODIUM 5 UG/1
5 TABLET ORAL DAILY
Qty: 30 TABLET | Refills: 11 | Status: SHIPPED | OUTPATIENT
Start: 2019-05-03 | End: 2020-01-27 | Stop reason: SDUPTHER

## 2019-05-03 NOTE — TELEPHONE ENCOUNTER
Received refill request fax from pharmacy for there Gabapentin 300mg. One bid    Last refill was 2/4/19 #180 with no refills.  Last office visit 2/13/19 and upcoming 7/25/19  Unable to do [place] at present

## 2019-05-15 RX ORDER — ATORVASTATIN CALCIUM 40 MG/1
40 TABLET, FILM COATED ORAL DAILY
Qty: 90 TABLET | Refills: 3 | Status: SHIPPED | OUTPATIENT
Start: 2019-05-15 | End: 2020-01-27 | Stop reason: SDUPTHER

## 2019-05-20 ENCOUNTER — LAB (OUTPATIENT)
Dept: LAB | Facility: HOSPITAL | Age: 84
End: 2019-05-20

## 2019-05-20 ENCOUNTER — OFFICE VISIT (OUTPATIENT)
Dept: INTERNAL MEDICINE | Facility: CLINIC | Age: 84
End: 2019-05-20

## 2019-05-20 VITALS
SYSTOLIC BLOOD PRESSURE: 142 MMHG | HEART RATE: 80 BPM | DIASTOLIC BLOOD PRESSURE: 80 MMHG | WEIGHT: 160 LBS | BODY MASS INDEX: 25.71 KG/M2 | HEIGHT: 66 IN

## 2019-05-20 DIAGNOSIS — R30.9 URINARY PAIN: ICD-10-CM

## 2019-05-20 DIAGNOSIS — I48.91 ATRIAL FIBRILLATION, UNSPECIFIED TYPE (HCC): ICD-10-CM

## 2019-05-20 DIAGNOSIS — R06.02 SHORTNESS OF BREATH: ICD-10-CM

## 2019-05-20 DIAGNOSIS — R30.9 URINARY PAIN: Primary | ICD-10-CM

## 2019-05-20 LAB
BILIRUB BLD-MCNC: NEGATIVE MG/DL
CLARITY, POC: ABNORMAL
COLOR UR: ABNORMAL
GLUCOSE UR STRIP-MCNC: NEGATIVE MG/DL
KETONES UR QL: NEGATIVE
LEUKOCYTE EST, POC: ABNORMAL
NITRITE UR-MCNC: NEGATIVE MG/ML
PH UR: 5.5 [PH] (ref 5–8)
PROT UR STRIP-MCNC: ABNORMAL MG/DL
RBC # UR STRIP: ABNORMAL /UL
SP GR UR: 1.01 (ref 1–1.03)
UROBILINOGEN UR QL: NORMAL

## 2019-05-20 PROCEDURE — 87088 URINE BACTERIA CULTURE: CPT

## 2019-05-20 PROCEDURE — 87086 URINE CULTURE/COLONY COUNT: CPT

## 2019-05-20 PROCEDURE — 99214 OFFICE O/P EST MOD 30 MIN: CPT | Performed by: NURSE PRACTITIONER

## 2019-05-20 PROCEDURE — 81003 URINALYSIS AUTO W/O SCOPE: CPT | Performed by: NURSE PRACTITIONER

## 2019-05-20 PROCEDURE — 93000 ELECTROCARDIOGRAM COMPLETE: CPT | Performed by: NURSE PRACTITIONER

## 2019-05-20 PROCEDURE — 87186 SC STD MICRODIL/AGAR DIL: CPT

## 2019-05-20 RX ORDER — NITROFURANTOIN 25; 75 MG/1; MG/1
100 CAPSULE ORAL 2 TIMES DAILY
Qty: 6 CAPSULE | Refills: 0 | Status: SHIPPED | OUTPATIENT
Start: 2019-05-20 | End: 2019-05-23

## 2019-05-20 NOTE — PROGRESS NOTES
Amparo Valdes  2/3/1930  [id number]  Patient Care Team:  Nj Mckeon MD as PCP - General  Nj Mckeon MD as PCP - Family Medicine    Amparo Valdes is a pleasant 89 y.o. female who presents for evaluation of Urinary Tract Infection (pain when urinating) and Shortness of Breath    Her daughter, Tamiko, to this visit.    Chief Complaint   Patient presents with   • Urinary Tract Infection     pain when urinating   • Shortness of Breath       HPI:   Dysuria: mid to late last week. Worse in mornings and when also having BM.  No abd pain, n/v/fever.  On daily trimethoprim 100mg for years for prevention.  Urine is thicker.    Dyspnea:  Not new.  Persistent since dx bronchitis in Feb.  Improved but not back to 100%.  Can only walk half amt of steps before stopping to rest compared to previous baseline past few mo.  No mobility aids.    Lying flat during exam detect irrgular heartbeat, will do EKG, sounds reg sitting upright, dyspnea worse lying flat.  She uses o2 HS and prn day (more recently)    Past Medical History:   Diagnosis Date   • Arthritis    • Cancer, skin, squamous cell 12/27/2012   • Colon polyps 1987    clear 1994   • COPD (chronic obstructive pulmonary disease) (CMS/[facility])    • Family history of coronary artery disease    • Family history of hyperlipidemia    • Family history of hypertension    • Family history of stroke    • Femoral hernia of right side 12/13/2011   • Hyperlipidemia    • Hypertension    • Hypothyroidism    • Osteoarthritis    • Osteoporosis    • Pneumonia    • Rectal fistula     repair   • SBO (small bowel obstruction) (CMS/[facility]) 09/13/2012    Had right inguinal hernia repair   • Sepsis (CMS/[facility]) 04/2014    - Drug therapy    • Stress incontinence     A and P  repair   • Thyroid disease        Past Surgical History:   Procedure Laterality Date   • ANAL FISTULA REPAIR     • CARDIAC SURGERY  10/2011    stents placed   • CATARACT EXTRACTION     • CORONARY ANGIOPLASTY WITH STENT PLACEMENT   11/2011    x3   • HYSTERECTOMY     • INGUINAL HERNIA REPAIR     • VAGINAL VAULT SUSPENSION         Family History   Problem Relation Age of Onset   • Stroke Mother    • Heart disease Father    • Hypertension Brother    • Diabetes Brother    • Diabetes Daughter    • Heart disease Daughter    • Hypertension Son    • Hypertension Other    • Coronary artery disease Other    • Stroke Other    • Hyperlipidemia Other    • Other Other         ichthyosis       Social History     Tobacco Use   Smoking Status Former Smoker   Tobacco Comment    quit at approx age 60       Allergies   Allergen Reactions   • Acetaminophen Unknown (See Comments)     Darvocet- N100   • Baycol [Cerivastatin] Unknown (See Comments)     unknown   • Ciprofloxacin Unknown (See Comments)     Unknown   • Ciprofloxacin Hcl Unknown (See Comments)     unknown   • Codeine Unknown (See Comments)     unknown   • Fosamax [Alendronate Sodium] Unknown (See Comments)     unknown   • Meperidine Unknown (See Comments)     unknown   • Methadone Unknown (See Comments)     unknown   • Morphine Unknown (See Comments)     unknown   • Naloxone Unknown (See Comments)     unknown   • Propoxyphene Unknown (See Comments)     darvocet-n 100   • Sulfa Antibiotics Unknown (See Comments)     unknown   • Ubidecarenone GI Intolerance   • Zocor [Simvastatin] Unknown (See Comments)     unknown       Review of Systems   Constitutional: Positive for fatigue. Negative for chills and fever.   HENT: Negative for congestion, ear pain and sinus pressure.    Respiratory: Positive for shortness of breath and wheezing. Negative for cough and chest tightness.    Cardiovascular: Negative for chest pain and palpitations.   Gastrointestinal: Negative for abdominal pain, blood in stool and constipation.   Genitourinary: Positive for urgency.   Skin: Negative for color change.   Allergic/Immunologic: Negative for environmental allergies.   Neurological: Negative for dizziness, speech difficulty and  "headache.   Psychiatric/Behavioral: Negative for decreased concentration. The patient is not nervous/anxious.        Vitals:    05/20/19 1455   BP: 142/80   BP Location: Left arm   Patient Position: Sitting   Cuff Size: Adult   Pulse: 80   Weight: 72.6 kg (160 lb)   Height: 167 cm (65.75\")         Current Outpatient Medications:   •  aspirin 81 MG EC tablet, Take 81 mg by mouth Daily., Disp: , Rfl:   •  atorvastatin (LIPITOR) 40 MG tablet, Take 1 tablet by mouth Daily., Disp: 90 tablet, Rfl: 3  •  calcium carbonate-vitamin d (CALTRATE 600+D) 600-400 MG-UNIT per tablet, Take 1 tablet by mouth Daily., Disp: , Rfl:   •  carvedilol (COREG) 12.5 MG tablet, TAKE 1 TABLET BY MOUTH TWICE A DAY WITH FOOD, Disp: 180 tablet, Rfl: 3  •  docusate sodium (COLACE) 100 MG capsule, Take 100 mg by mouth 2 (Two) Times a Day As Needed for Constipation., Disp: , Rfl:   •  furosemide (LASIX) 20 MG tablet, Take 20 mg by mouth. TAKE MTW, Disp: , Rfl:   •  gabapentin (NEURONTIN) 300 MG capsule, Take 1 capsule by mouth 2 (Two) Times a Day., Disp: 180 capsule, Rfl: 0  •  levothyroxine (SYNTHROID, LEVOTHROID) 75 MCG tablet, Take 75 mcg by mouth Daily., Disp: , Rfl:   •  liothyronine (CYTOMEL) 5 MCG tablet, Take 1 tablet by mouth Daily., Disp: 30 tablet, Rfl: 11  •  Multiple Vitamins-Minerals (CENTRUM SILVER PO), Take 1 tablet by mouth Daily., Disp: , Rfl:   •  O2 (OXYGEN), Inhale Daily As Needed., Disp: , Rfl:   •  pantoprazole (PROTONIX) 40 MG EC tablet, Take 40 mg by mouth Daily., Disp: , Rfl:   •  polyethyl glycol-propyl glycol (SYSTANE) 0.4-0.3 % solution ophthalmic solution, Administer 1 drop to both eyes 2 (Two) Times a Day., Disp: , Rfl:   •  potassium chloride (MICRO-K) 10 MEQ CR capsule, Take 5 mEq by mouth Daily., Disp: , Rfl:   •  Probiotic Product (ALIGN) 4 MG capsule, Take  by mouth Daily., Disp: , Rfl:   •  trimethoprim (TRIMPEX) 100 MG tablet, trimethoprim 100 mg tablet  TAKE 1 TABLET BY MOUTH EVERY DAY, Disp: , Rfl:   •  " "nitrofurantoin, macrocrystal-monohydrate, (MACROBID) 100 MG capsule, Take 1 capsule by mouth 2 (Two) Times a Day., Disp: 6 capsule, Rfl: 0    Physical Exam   Constitutional: She appears well-developed and well-nourished.   HENT:   Head: Normocephalic and atraumatic.   Right Ear: External ear normal.   Left Ear: External ear normal.   Mouth/Throat: Oropharynx is clear and moist.   Eyes: Conjunctivae and EOM are normal.   Neck: Normal range of motion. Neck supple.   Cardiovascular: Normal rate and intact distal pulses. An irregularly irregular rhythm present.   Murmur heard.   Systolic murmur is present with a grade of 2/6.  No LE edema   Pulmonary/Chest: Effort normal and breath sounds normal.   Abdominal: Soft. Bowel sounds are normal. There is no tenderness.   Musculoskeletal: Normal range of motion.   Lymphadenopathy:     She has no cervical adenopathy.   Neurological: She is alert.   Skin: Skin is warm and dry.   Psychiatric: She has a normal mood and affect. Her behavior is normal. Thought content normal.         ECG 12 Lead  Date/Time: 5/20/2019 3:44 PM  Performed by: Kira Carvalho APRN  Authorized by: Kira Carvalho APRN   Comparison: compared with previous ECG from 1/25/2019  Similar to previous ECG  Rhythm: atrial fibrillation  BPM: 52  Conduction: 1st degree AV block    Clinical impression: abnormal EKG          Assessment/Plan:    Problem List Items Addressed This Visit     None      Visit Diagnoses     Urinary pain    -  Primary    Relevant Orders    POC Urinalysis Dipstick, Automated (Completed)    Urine Culture - Urine, Urine, Clean Catch    Shortness of breath        Relevant Orders    ECG 12 Lead    Ambulatory Referral to [facility] [place] [facility] [place]    Atrial fibrillation, unspecified type (CMS/HCC)        Relevant Orders    Ambulatory Referral to [facility] [place] [facility] [place]      Best contact is ced Valdes \"Tamiko\" for scheduling at [facility].  " Her number is [phone]    Plan of care reviewed with patient at the conclusion of today's visit. Education was provided regarding diagnosis, management and any prescribed or recommended OTC medications.  Patient verbalizes understanding of and agreement with management plan.    Return refer to a fib clinic.    *Note that portions of this note were completed with a voice recognition program.  Efforts were made to edit the dictation but occasionally words are transcribed.    Kira Carvalho, APRN

## 2019-05-22 LAB — BACTERIA SPEC AEROBE CULT: ABNORMAL

## 2019-05-23 ENCOUNTER — LAB (OUTPATIENT)
Dept: LAB | Facility: HOSPITAL | Age: 84
End: 2019-05-23

## 2019-05-23 ENCOUNTER — OFFICE VISIT (OUTPATIENT)
Dept: CARDIOLOGY | Facility: HOSPITAL | Age: 84
End: 2019-05-23

## 2019-05-23 ENCOUNTER — HOSPITAL ENCOUNTER (OUTPATIENT)
Dept: CARDIOLOGY | Facility: HOSPITAL | Age: 84
Discharge: HOME | End: 2019-05-23
Admitting: NURSE PRACTITIONER

## 2019-05-23 VITALS
TEMPERATURE: 98.1 F | RESPIRATION RATE: 18 BRPM | OXYGEN SATURATION: 93 % | DIASTOLIC BLOOD PRESSURE: 70 MMHG | BODY MASS INDEX: 26.22 KG/M2 | HEIGHT: 66 IN | WEIGHT: 163.13 LBS | HEART RATE: 62 BPM | SYSTOLIC BLOOD PRESSURE: 156 MMHG

## 2019-05-23 DIAGNOSIS — I10 BENIGN ESSENTIAL HYPERTENSION: ICD-10-CM

## 2019-05-23 DIAGNOSIS — I48.19 PERSISTENT ATRIAL FIBRILLATION (HCC): ICD-10-CM

## 2019-05-23 DIAGNOSIS — R06.02 SHORTNESS OF BREATH: ICD-10-CM

## 2019-05-23 DIAGNOSIS — I25.10 CORONARY ARTERY DISEASE INVOLVING NATIVE CORONARY ARTERY OF NATIVE HEART WITHOUT ANGINA PECTORIS: ICD-10-CM

## 2019-05-23 DIAGNOSIS — I48.19 PERSISTENT ATRIAL FIBRILLATION (HCC): Primary | ICD-10-CM

## 2019-05-23 LAB
ALBUMIN SERPL-MCNC: 4.3 G/DL (ref 3.5–5.2)
ALBUMIN/GLOB SERPL: 1.7 G/DL
ALP SERPL-CCNC: 86 U/L (ref 39–117)
ALT SERPL W P-5'-P-CCNC: 38 U/L (ref 1–33)
ANION GAP SERPL CALCULATED.3IONS-SCNC: 14.4 MMOL/L
AST SERPL-CCNC: 29 U/L (ref 1–32)
BILIRUB SERPL-MCNC: 0.4 MG/DL (ref 0.2–1.2)
BUN BLD-MCNC: 17 MG/DL (ref 8–23)
BUN/CREAT SERPL: 13.9 (ref 7–25)
CALCIUM SPEC-SCNC: 9.3 MG/DL (ref 8.6–10.5)
CHLORIDE SERPL-SCNC: 94 MMOL/L (ref 98–107)
CO2 SERPL-SCNC: 25.6 MMOL/L (ref 22–29)
CREAT BLD-MCNC: 1.22 MG/DL (ref 0.57–1)
DEPRECATED RDW RBC AUTO: 49.4 FL (ref 37–54)
ERYTHROCYTE [DISTWIDTH] IN BLOOD BY AUTOMATED COUNT: 13.5 % (ref 12.3–15.4)
GFR SERPL CREATININE-BSD FRML MDRD: 42 ML/MIN/1.73
GLOBULIN UR ELPH-MCNC: 2.5 GM/DL
GLUCOSE BLD-MCNC: 113 MG/DL (ref 65–99)
HCT VFR BLD AUTO: 39 % (ref 34–46.6)
HGB BLD-MCNC: 12.3 G/DL (ref 12–15.9)
MAGNESIUM SERPL-MCNC: 2 MG/DL (ref 1.6–2.4)
MCH RBC QN AUTO: 31.4 PG (ref 26.6–33)
MCHC RBC AUTO-ENTMCNC: 31.5 G/DL (ref 31.5–35.7)
MCV RBC AUTO: 99.5 FL (ref 79–97)
PLATELET # BLD AUTO: 342 10*3/MM3 (ref 140–450)
PMV BLD AUTO: 10.3 FL (ref 6–12)
POTASSIUM BLD-SCNC: 4.5 MMOL/L (ref 3.5–5.2)
PROT SERPL-MCNC: 6.8 G/DL (ref 6–8.5)
RBC # BLD AUTO: 3.92 10*6/MM3 (ref 3.77–5.28)
SODIUM BLD-SCNC: 134 MMOL/L (ref 136–145)
TSH SERPL DL<=0.05 MIU/L-ACNC: 0.04 MIU/ML (ref 0.27–4.2)
WBC NRBC COR # BLD: 8.13 10*3/MM3 (ref 3.4–10.8)

## 2019-05-23 PROCEDURE — 93010 ELECTROCARDIOGRAM REPORT: CPT | Performed by: INTERNAL MEDICINE

## 2019-05-23 PROCEDURE — 80053 COMPREHEN METABOLIC PANEL: CPT

## 2019-05-23 PROCEDURE — 93005 ELECTROCARDIOGRAM TRACING: CPT | Performed by: NURSE PRACTITIONER

## 2019-05-23 PROCEDURE — 84443 ASSAY THYROID STIM HORMONE: CPT

## 2019-05-23 PROCEDURE — 85027 COMPLETE CBC AUTOMATED: CPT

## 2019-05-23 PROCEDURE — 99214 OFFICE O/P EST MOD 30 MIN: CPT | Performed by: NURSE PRACTITIONER

## 2019-05-23 PROCEDURE — 83735 ASSAY OF MAGNESIUM: CPT

## 2019-05-23 PROCEDURE — 36415 COLL VENOUS BLD VENIPUNCTURE: CPT

## 2019-05-23 RX ORDER — CARVEDILOL 12.5 MG/1
6.25 TABLET ORAL 2 TIMES DAILY WITH MEALS
Qty: 180 TABLET | Refills: 3
Start: 2019-05-23 | End: 2020-01-27 | Stop reason: SDUPTHER

## 2019-05-23 NOTE — PROGRESS NOTES
[facility]      Encounter Date:05/23/2019     Amparo Valdes  [address] [facility] [address]  [unfilled]    2/3/1930    Nj Mckeon MD    Amparo Valdes is a 89 y.o. female.      Subjective:     Chief Complaint:  Atrial Fibrillation (newly diagnosed)       HPI     89-year-old female with history of hyperlipidemia, CAD with cardiac stents x3, hypothyroidism, history of CHF.  Denies history of TIA or CVA.  Patient presented to her primary care provider's office this week with complaints of dysuria, persistent dyspnea since bronchitis diagnosed in February of this year.  Increased dyspnea on exertion.  EKG completed and primary care provider's office noted to be in atrial fibrillation with slow ventricular response 52 bpm.      Denies chest pain, pressure, palpitations, dizziness, near syncope, edema.  Describes dyspnea as moderate, intermittent, progressive.  Patient is unaware of atrial fibrillation.  No history of atrial fibrillation.  Reports blood pressure at home is in the 120s (although she does not have a home heart rate log).  Former patient of  and would like to continue care with him.  Denies history of intracranial hemorrhage, GI bleed, severe anemia needing blood transfusions.    Patient Active Problem List    Diagnosis Date Noted   • Arteriosclerosis of coronary artery bypass graft 02/13/2019   • Asthenia 02/13/2019   • Chronic UTI 02/13/2019   • CHF (congestive heart failure) (CMS/[facility]) 02/13/2019   • Femoral hernia 02/13/2019   • First degree atrioventricular block 02/13/2019   • Heart disorder 02/13/2019   • Herpes zoster 02/13/2019   • Hyponatremia 02/13/2019   • Iron deficiency anemia 02/13/2019   • Low blood pressure 02/13/2019   • Mixed hyperlipidemia 02/13/2019   • Osteoarthritis 02/13/2019   • Parasomnia 02/13/2019   • Urethritis 02/13/2019   • Irritable bowel syndrome with constipation and diarrhea 01/25/2019   • Chronic cystitis  01/25/2019   • Benign essential hypertension 01/25/2019   • ASHD (arteriosclerotic heart disease) 01/25/2019   • Moderate COPD (chronic obstructive pulmonary disease) (CMS/[facility]) 01/25/2019   • Acquired hypothyroidism 01/25/2019   • Hypercholesterolemia 01/25/2019   • Other specified anemias 01/25/2019   • Primary osteoarthritis involving multiple joints 01/25/2019   • Advanced age 01/25/2019   • Ischemic heart disease 01/25/2019   • Gastroesophageal reflux disease without esophagitis 01/25/2019         Past Surgical History:   Procedure Laterality Date   • ANAL FISTULA REPAIR     • CARDIAC SURGERY  10/2011    stents placed   • CATARACT EXTRACTION     • CORONARY ANGIOPLASTY WITH STENT PLACEMENT  11/2011    x3   • HYSTERECTOMY     • INGUINAL HERNIA REPAIR     • OTHER SURGICAL HISTORY      thyroid uptake   • VAGINAL VAULT SUSPENSION         Allergies   Allergen Reactions   • Acetaminophen Unknown (See Comments)     Darvocet- N100   • Baycol [Cerivastatin] Unknown (See Comments)     unknown   • Ciprofloxacin Unknown (See Comments)     Unknown   • Ciprofloxacin Hcl Unknown (See Comments)     unknown   • Codeine Unknown (See Comments)     unknown   • Fosamax [Alendronate Sodium] Unknown (See Comments)     unknown   • Meperidine Unknown (See Comments)     unknown   • Methadone Unknown (See Comments)     unknown   • Morphine Unknown (See Comments)     unknown   • Naloxone Unknown (See Comments)     unknown   • Propoxyphene Unknown (See Comments)     darvocet-n 100   • Sulfa Antibiotics Unknown (See Comments)     unknown   • Ubidecarenone GI Intolerance   • Zocor [Simvastatin] Unknown (See Comments)     unknown         Current Outpatient Medications:   •  aspirin 81 MG EC tablet, Take 81 mg by mouth Daily., Disp: , Rfl:   •  atorvastatin (LIPITOR) 40 MG tablet, Take 1 tablet by mouth Daily., Disp: 90 tablet, Rfl: 3  •  calcium carbonate-vitamin d (CALTRATE 600+D) 600-400 MG-UNIT per tablet, Take 1 tablet by mouth Daily.,  Disp: , Rfl:   •  carvedilol (COREG) 12.5 MG tablet, Take 0.5 tablets by mouth 2 (Two) Times a Day With Meals., Disp: 180 tablet, Rfl: 3  •  docusate sodium (COLACE) 100 MG capsule, Take 100 mg by mouth 2 (Two) Times a Day As Needed for Constipation., Disp: , Rfl:   •  furosemide (LASIX) 20 MG tablet, Take 20 mg by mouth Daily., Disp: , Rfl:   •  gabapentin (NEURONTIN) 300 MG capsule, Take 1 capsule by mouth 2 (Two) Times a Day., Disp: 180 capsule, Rfl: 0  •  levothyroxine (SYNTHROID, LEVOTHROID) 75 MCG tablet, Take 75 mcg by mouth Daily., Disp: , Rfl:   •  liothyronine (CYTOMEL) 5 MCG tablet, Take 1 tablet by mouth Daily., Disp: 30 tablet, Rfl: 11  •  Multiple Vitamins-Minerals (CENTRUM SILVER PO), Take 1 tablet by mouth Daily., Disp: , Rfl:   •  pantoprazole (PROTONIX) 40 MG EC tablet, Take 40 mg by mouth Daily., Disp: , Rfl:   •  polyethyl glycol-propyl glycol (SYSTANE) 0.4-0.3 % solution ophthalmic solution, Administer 1 drop to both eyes 2 (Two) Times a Day., Disp: , Rfl:   •  potassium chloride (MICRO-K) 10 MEQ CR capsule, Take 5 mEq by mouth Daily., Disp: , Rfl:   •  Probiotic Product (ALIGN) 4 MG capsule, Take  by mouth Daily., Disp: , Rfl:   •  trimethoprim (TRIMPEX) 100 MG tablet, trimethoprim 100 mg tablet  TAKE 1 TABLET BY MOUTH EVERY DAY, Disp: , Rfl:   •  apixaban (ELIQUIS) 5 MG tablet tablet, Take 1 tablet by mouth 2 (Two) Times a Day., Disp: 30 tablet, Rfl: 0  •  O2 (OXYGEN), Inhale Daily As Needed., Disp: , Rfl:     The following portions of the patient's history were reviewed and updated today during office visit as appropriate: allergies, current medications, past family history, past medical history, past social history, past surgical history and problem list.    Review of Systems   Cardiovascular: Positive for dyspnea on exertion.   All other systems reviewed and are negative.      Objective:     Vitals:    05/23/19 1348 05/23/19 1349 05/23/19 1350   BP: 162/70 160/72 156/70   BP Location: Right  "arm Left arm Left arm   Patient Position: Sitting Sitting Standing   Cuff Size: Adult Adult Adult   Pulse: 62 59 62   Resp: 18     Temp: 98.1 °F (36.7 °C)     TempSrc: Temporal     SpO2: 96% 95% 93%   Weight: 74 kg (163 lb 2 oz)     Height: 167 cm (65.75\")           Physical Exam   Constitutional: She is oriented to person, place, and time. She appears well-developed and well-nourished. No distress.   HENT:   Head: Normocephalic and atraumatic.   Eyes: No scleral icterus.   Neck: No hepatojugular reflux and no JVD present. Carotid bruit is not present. No tracheal deviation present. No thyromegaly present.   Cardiovascular: Intact distal pulses. An irregularly irregular rhythm present. Bradycardia present. Exam reveals no friction rub.   Murmur heard.  Pulmonary/Chest: Effort normal and breath sounds normal.   Abdominal: Soft. Bowel sounds are normal. She exhibits no distension. There is no tenderness.   Musculoskeletal: She exhibits no edema.   Lymphadenopathy:     She has no cervical adenopathy.   Neurological: She is alert and oriented to person, place, and time.   Skin: Skin is warm, dry and intact. No rash noted. No cyanosis or erythema. No pallor.   Psychiatric: She has a normal mood and affect. Her behavior is normal. Thought content normal.   Vitals reviewed.      Lab and Diagnostic Review:  Lab Results   Component Value Date    GLUCOSE 110 (H) 01/25/2019    CALCIUM 9.9 01/25/2019     01/25/2019    K 5.0 01/25/2019    CO2 28.0 01/25/2019     01/25/2019    BUN 18 01/25/2019    CREATININE 0.82 01/25/2019    EGFRIFNONA 66 01/25/2019    BCR 22.0 01/25/2019    ANIONGAP 7.0 01/25/2019       Assessment and Plan:         1. Persistent atrial fibrillation (CMS/HCC)  Newly diagnosed, duration unknown  Chadsvasc: 5    A. fib education completed: What is atrial fibrillation, causes, triggers, signs and symptoms, medication management (rate control versus rhythm control) and stroke prevention, procedural " management and indications, and the role of the atrial fibrillation center and when to call.    - ECG 12 Lead; A. fib 52 bpm    - CBC (No Diff); Future  - TSH; Future  - Comprehensive Metabolic Panel; Future  - Magnesium; Future    Due to bradycardia decrease Coreg to 6.25 mg twice a day  - carvedilol (COREG) 12.5 MG tablet; Take 0.5 tablets by mouth 2 (Two) Times a Day With Meals.  Dispense: 180 tablet; Refill: 3    Initiate:  - apixaban (ELIQUIS) 5 MG tablet tablet; Take 1 tablet by mouth 2 (Two) Times a Day.  Dispense: 30 tablet; Refill: 0    Discussed ordering echocardiogram.  Patient would like continued follow-up with Dr. Bryan    2. Shortness of breath  Patient describes dyspnea on exertion.  No acute distress while in clinic today.  Cardiology to evaluate continued management.  Possible echo and stress testing.    3. Coronary artery disease involving native coronary artery of native heart without angina pectoris  Asa, statin  Hx of stents    4. Benign essential hypertension  Elevated today.  Patient encouraged to keep home blood pressure log until follow-up with cardiology    5. Bradycardia.  Decrease BB    Encourage patient to follow-up with primary care provider on a routine basis.  Follow-up with cardiology to be scheduled.        *Please note that portions of this note were completed with a voice recognition program. Efforts were made to edit the dictations, but occasionally words are mistranscribed.

## 2019-05-24 ENCOUNTER — TELEPHONE (OUTPATIENT)
Dept: CARDIOLOGY | Facility: HOSPITAL | Age: 84
End: 2019-05-24

## 2019-05-24 ENCOUNTER — TELEPHONE (OUTPATIENT)
Dept: INTERNAL MEDICINE | Facility: CLINIC | Age: 84
End: 2019-05-24

## 2019-05-24 DIAGNOSIS — R79.89 LOW TSH LEVEL: Primary | ICD-10-CM

## 2019-05-24 NOTE — TELEPHONE ENCOUNTER
Called and reviewed lab results.      TSH low.  Patient currently on Cytomel.  Creatinine slightly elevated, sodium mildly decreased.  Encourage patient to follow-up with primary care provider for continued management and evaluation.    Patient has appointment scheduled this afternoon to see her cardiologist regarding new onset of atrial fibrillation.

## 2019-06-04 ENCOUNTER — TELEPHONE (OUTPATIENT)
Dept: INTERNAL MEDICINE | Facility: CLINIC | Age: 84
End: 2019-06-04

## 2019-06-04 NOTE — TELEPHONE ENCOUNTER
YASMINI:  PT'S DAUGHTER HELDER CALLED AND STATED HER MOTHER HAS FOLLOWED UP WITH CARDIOLOGY AND IS HAVING A ROSLYN TEST PREFORMED THIS Thursday.

## 2019-06-17 DIAGNOSIS — I48.19 PERSISTENT ATRIAL FIBRILLATION (HCC): ICD-10-CM

## 2019-06-19 RX ORDER — APIXABAN 5 MG/1
TABLET, FILM COATED ORAL
Qty: 30 TABLET | Refills: 0 | OUTPATIENT
Start: 2019-06-19

## 2019-06-19 NOTE — TELEPHONE ENCOUNTER
Patient following with Dr. Bryan, cardiologist. I contacted the patient and she has refills from this physician and does not require any from our office at this time. Refill for Eliquis denied.    Janette Drake, PharmD

## 2019-06-28 ENCOUNTER — TELEPHONE (OUTPATIENT)
Dept: INTERNAL MEDICINE | Facility: CLINIC | Age: 84
End: 2019-06-28

## 2019-06-28 RX ORDER — FERROUS SULFATE 325(65) MG
325 TABLET ORAL
Qty: 30 TABLET | Refills: 5 | Status: SHIPPED | OUTPATIENT
Start: 2019-06-28 | End: 2020-01-27

## 2019-06-28 NOTE — TELEPHONE ENCOUNTER
Received fax request for refill on ferrous sulfate 325mg. One daily.  Not on med list.  Should she stay on this?

## 2019-07-01 ENCOUNTER — LAB (OUTPATIENT)
Dept: LAB | Facility: HOSPITAL | Age: 84
End: 2019-07-01

## 2019-07-01 ENCOUNTER — TRANSCRIBE ORDERS (OUTPATIENT)
Dept: LAB | Facility: HOSPITAL | Age: 84
End: 2019-07-01

## 2019-07-01 DIAGNOSIS — E78.00 PURE HYPERCHOLESTEROLEMIA: ICD-10-CM

## 2019-07-01 DIAGNOSIS — E78.00 PURE HYPERCHOLESTEROLEMIA: Primary | ICD-10-CM

## 2019-07-01 LAB
ALBUMIN SERPL-MCNC: 4.3 G/DL (ref 3.5–5.2)
ALP SERPL-CCNC: 82 U/L (ref 39–117)
ALT SERPL W P-5'-P-CCNC: 34 U/L (ref 1–33)
AST SERPL-CCNC: 26 U/L (ref 1–32)
BILIRUB CONJ SERPL-MCNC: 0.2 MG/DL (ref 0.2–0.3)
BILIRUB INDIRECT SERPL-MCNC: 0.3 MG/DL
BILIRUB SERPL-MCNC: 0.5 MG/DL (ref 0.2–1.2)
CHOLEST SERPL-MCNC: 129 MG/DL (ref 0–200)
HDLC SERPL-MCNC: 55 MG/DL (ref 40–60)
LDLC SERPL CALC-MCNC: 58 MG/DL (ref 0–100)
LDLC/HDLC SERPL: 1.06 {RATIO}
PROT SERPL-MCNC: 7 G/DL (ref 6–8.5)
TRIGL SERPL-MCNC: 78 MG/DL (ref 0–150)
VLDLC SERPL-MCNC: 15.6 MG/DL (ref 5–40)

## 2019-07-01 PROCEDURE — 80076 HEPATIC FUNCTION PANEL: CPT

## 2019-07-01 PROCEDURE — 36415 COLL VENOUS BLD VENIPUNCTURE: CPT

## 2019-07-01 PROCEDURE — 80061 LIPID PANEL: CPT

## 2019-07-18 RX ORDER — LEVOTHYROXINE SODIUM 0.07 MG/1
TABLET ORAL
Qty: 90 TABLET | Refills: 1 | Status: SHIPPED | OUTPATIENT
Start: 2019-07-18 | End: 2020-01-08

## 2019-07-25 ENCOUNTER — OFFICE VISIT (OUTPATIENT)
Dept: INTERNAL MEDICINE | Facility: CLINIC | Age: 84
End: 2019-07-25

## 2019-07-25 VITALS
WEIGHT: 157 LBS | SYSTOLIC BLOOD PRESSURE: 158 MMHG | DIASTOLIC BLOOD PRESSURE: 68 MMHG | BODY MASS INDEX: 26.16 KG/M2 | HEART RATE: 56 BPM | HEIGHT: 65 IN | TEMPERATURE: 97.7 F

## 2019-07-25 DIAGNOSIS — I10 BENIGN ESSENTIAL HYPERTENSION: ICD-10-CM

## 2019-07-25 DIAGNOSIS — I48.0 PAROXYSMAL ATRIAL FIBRILLATION (HCC): ICD-10-CM

## 2019-07-25 DIAGNOSIS — I50.22 CHRONIC SYSTOLIC CONGESTIVE HEART FAILURE (HCC): ICD-10-CM

## 2019-07-25 DIAGNOSIS — E03.9 ACQUIRED HYPOTHYROIDISM: ICD-10-CM

## 2019-07-25 DIAGNOSIS — E78.2 MIXED HYPERLIPIDEMIA: Primary | ICD-10-CM

## 2019-07-25 DIAGNOSIS — L08.9 SKIN INFECTION: ICD-10-CM

## 2019-07-25 DIAGNOSIS — J44.9 MODERATE COPD (CHRONIC OBSTRUCTIVE PULMONARY DISEASE) (HCC): ICD-10-CM

## 2019-07-25 PROCEDURE — 99214 OFFICE O/P EST MOD 30 MIN: CPT | Performed by: INTERNAL MEDICINE

## 2019-07-25 RX ORDER — CEPHALEXIN 500 MG/1
500 CAPSULE ORAL 3 TIMES DAILY
COMMUNITY
End: 2019-09-26

## 2019-07-25 NOTE — ASSESSMENT & PLAN NOTE
Patient with a history of hypothyroidism currently on Cytomel 5 MCG daily and levothyroxine 75 MCG daily no change in therapy

## 2019-07-25 NOTE — ASSESSMENT & PLAN NOTE
History of mild COPD on oxygen q. evening occasionally during the day no recent pulmonary infections rare cough rare wheeze mildly short of breath with physical effort no change in oxygen.  With O2 saturation on room air of 92%.

## 2019-07-25 NOTE — ASSESSMENT & PLAN NOTE
Currently in sinus rhythm having converted June 2019 on Eliquis 5 twice a day Coreg 6.25 twice daily is followed by Dr. Chen

## 2019-07-25 NOTE — PATIENT INSTRUCTIONS
Therapy by cardiology discussed recommend continuing antibiotic for cellulitis of lower legs  Encouraged walking activity  Encouraged continued healthy cardiac diet with reduced salt  Continue current medications  Return visit in 2 months or as needed

## 2019-07-25 NOTE — ASSESSMENT & PLAN NOTE
History of heart failure currently compensated on Coreg 6.25 twice daily Eliquis 5 mg twice daily Lipitor 40 mg daily Lasix 20 mg daily oxygen 2 L at night patient has lost weight down to 157 pounds from 163 she is on low-salt no coughing or wheezing minor effort fatigue and shortness of breath suggest no change in therapy.

## 2019-07-25 NOTE — PROGRESS NOTES
[facility]     Amparo Valdes  2/3/1930   [id number]      Patient Care Team:  Nj Mckeon MD as PCP - General  Nj Mckeon MD as PCP - Family Medicine    Chief Complaint::   Chief Complaint   Patient presents with   • left foot swelling and redness     was checked for DVT-negative and then put on keflex tid by Dr. Bryan   • Hypertension   • Atrial Fibrillation     just finished a 3 week cardiac monitor on Tues            HPI  Patient 89-year-old female with a history of COPD on evening oxygen history of coronary disease episode of atrial fib flutter in May 2019 was seen by cardiology had a transesophageal echo no atrial clot was cardioverted currently on Coreg 6.25 twice daily Eliquis 5 mg twice daily and Lipitor 40 mg daily she remains compensated denies cough or wheeze denies chest pain or pressure complaining of some lower leg swelling has recently undergone leg venous Dopplers-negative for DVT cardiology placed her on Keflex her legs are slightly improved.      Patient Active Problem List   Diagnosis   • Irritable bowel syndrome with constipation and diarrhea   • Chronic cystitis   • Benign essential hypertension   • ASHD (arteriosclerotic heart disease)   • Moderate COPD (chronic obstructive pulmonary disease) (CMS/HCC)   • Acquired hypothyroidism   • Hypercholesterolemia   • Other specified anemias   • Primary osteoarthritis involving multiple joints   • Advanced age   • Ischemic heart disease   • Gastroesophageal reflux disease without esophagitis   • Arteriosclerosis of coronary artery bypass graft   • Asthenia   • Chronic UTI   • CHF (congestive heart failure) (CMS/HCC)   • Femoral hernia   • First degree atrioventricular block   • Heart disorder   • Herpes zoster   • Hyponatremia   • Iron deficiency anemia   • Low blood pressure   • Mixed hyperlipidemia   • Osteoarthritis   • Parasomnia   • Urethritis   • Paroxysmal atrial fibrillation (CMS/HCC)   • Skin infection        Past Medical  History:   Diagnosis Date   • Arthritis    • Cancer, skin, squamous cell 12/27/2012   • Colon polyps 1987    clear 1994   • COPD (chronic obstructive pulmonary disease) (CMS/[facility])    • Family history of coronary artery disease    • Family history of hyperlipidemia    • Family history of hypertension    • Family history of stroke    • Femoral hernia of right side 12/13/2011   • Hyperlipidemia    • Hypertension    • Hypothyroidism    • Osteoarthritis    • Osteoporosis    • Pneumonia    • Rectal fistula     repair   • SBO (small bowel obstruction) (CMS/HCC) 09/13/2012    Had right inguinal hernia repair   • Sepsis (CMS/HCC) 04/2014    - Drug therapy    • Stress incontinence     A and P  repair   • Thyroid disease        Past Surgical History:   Procedure Laterality Date   • ANAL FISTULA REPAIR     • CARDIAC SURGERY  10/2011    stents placed   • CATARACT EXTRACTION     • CORONARY ANGIOPLASTY WITH STENT PLACEMENT  11/2011    x3   • HYSTERECTOMY     • INGUINAL HERNIA REPAIR     • OTHER SURGICAL HISTORY      thyroid uptake   • VAGINAL VAULT SUSPENSION         Family History   Problem Relation Age of Onset   • Stroke Mother    • Heart disease Father    • Hypertension Brother    • Diabetes Brother    • Diabetes Daughter    • Heart disease Daughter    • Hypertension Son    • Hypertension Other    • Coronary artery disease Other    • Stroke Other    • Hyperlipidemia Other    • Other Other         ichthyosis       Social History     Socioeconomic History   • Marital status:      Spouse name: Not on file   • Number of children: Not on file   • Years of education: Not on file   • Highest education level: Not on file   Tobacco Use   • Smoking status: Former Smoker   • Smokeless tobacco: Former User   • Tobacco comment: quit at approx age 60   Substance and Sexual Activity   • Alcohol use: Yes     Comment: rarely   • Drug use: No   • Sexual activity: Defer   Social History Narrative    Caffeine: 2 cups daily       Allergies  "  Allergen Reactions   • Acetaminophen Unknown (See Comments)     Darvocet- N100   • Baycol [Cerivastatin] Unknown (See Comments)     unknown   • Ciprofloxacin Unknown (See Comments)     Unknown   • Ciprofloxacin Hcl Unknown (See Comments)     unknown   • Codeine Unknown (See Comments)     unknown   • Fosamax [Alendronate Sodium] Unknown (See Comments)     unknown   • Meperidine Unknown (See Comments)     unknown   • Methadone Unknown (See Comments)     unknown   • Morphine Unknown (See Comments)     unknown   • Naloxone Unknown (See Comments)     unknown   • Propoxyphene Unknown (See Comments)     darvocet-n 100   • Sulfa Antibiotics Unknown (See Comments)     unknown   • Ubidecarenone GI Intolerance   • Zocor [Simvastatin] Unknown (See Comments)     unknown       Review of Systems     HEENT: Denies headache or dizzy  NECK: Denies dysphasia or pain  CHEST: Mild COPD on O2 2 L in the evening denies recent lung infections cough wheeze or shortness of breath  CARDIAC: Denies chest pain or pressure recent episode of atrial fibrillation converted to sinus rhythm remote history of cardiovascular ischemia currently stable without chest pain or pressure  ABD: Denies nausea vomiting abdominal pain  : Denies dysuria  NEURO: Denies syncope concussion neuropathy  PSYCH: Denies anxiety depression  EXTREM: Complains of lower extremity swelling and recent erythema-improved    Vital Signs  Vitals:    07/25/19 1348   BP: 158/68   BP Location: Left arm   Patient Position: Sitting   Pulse: 56  Comment: irregular   Temp: 97.7 °F (36.5 °C)   TempSrc: Temporal   Weight: 71.2 kg (157 lb)   Height: 165.1 cm (65\")   PainSc: 0-No pain   Body mass index is 26.13 kg/m².        Current Outpatient Medications:   •  apixaban (ELIQUIS) 5 MG tablet tablet, Take 1 tablet by mouth 2 (Two) Times a Day., Disp: 30 tablet, Rfl: 0  •  atorvastatin (LIPITOR) 40 MG tablet, Take 1 tablet by mouth Daily., Disp: 90 tablet, Rfl: 3  •  calcium " carbonate-vitamin d (CALTRATE 600+D) 600-400 MG-UNIT per tablet, Take 1 tablet by mouth Daily., Disp: , Rfl:   •  carvedilol (COREG) 12.5 MG tablet, Take 0.5 tablets by mouth 2 (Two) Times a Day With Meals., Disp: 180 tablet, Rfl: 3  •  cephalexin (KEFLEX) 500 MG capsule, Take 500 mg by mouth 3 (Three) Times a Day., Disp: , Rfl:   •  docusate sodium (COLACE) 100 MG capsule, 100 mg Daily., Disp: , Rfl:   •  ferrous sulfate 325 (65 FE) MG tablet, Take 1 tablet by mouth Daily With Breakfast. (Patient taking differently: Take 325 mg by mouth Daily With Breakfast. Takes only 2 x weekly), Disp: 30 tablet, Rfl: 5  •  furosemide (LASIX) 20 MG tablet, Take 20 mg by mouth Daily., Disp: , Rfl:   •  gabapentin (NEURONTIN) 300 MG capsule, Take 1 capsule by mouth 2 (Two) Times a Day., Disp: 180 capsule, Rfl: 0  •  levothyroxine (SYNTHROID, LEVOTHROID) 75 MCG tablet, TAKE 1 TABLET BY MOUTH ONCE DAILY, Disp: 90 tablet, Rfl: 1  •  liothyronine (CYTOMEL) 5 MCG tablet, Take 1 tablet by mouth Daily., Disp: 30 tablet, Rfl: 11  •  Multiple Vitamins-Minerals (CENTRUM SILVER PO), Take 1 tablet by mouth Daily., Disp: , Rfl:   •  O2 (OXYGEN), Inhale 2 L/min Every Night., Disp: , Rfl:   •  pantoprazole (PROTONIX) 40 MG EC tablet, Take 40 mg by mouth Daily., Disp: , Rfl:   •  polyethyl glycol-propyl glycol (SYSTANE) 0.4-0.3 % solution ophthalmic solution, Administer 1 drop to both eyes 2 (Two) Times a Day., Disp: , Rfl:   •  potassium chloride (MICRO-K) 10 MEQ CR capsule, Take 5 mEq by mouth Daily., Disp: , Rfl:   •  trimethoprim (TRIMPEX) 100 MG tablet, trimethoprim 100 mg tablet  TAKE 1 TABLET BY MOUTH EVERY DAY, Disp: , Rfl:   •  aspirin 81 MG EC tablet, Take 81 mg by mouth Daily., Disp: , Rfl:   •  Probiotic Product (ALIGN) 4 MG capsule, Take  by mouth Daily., Disp: , Rfl:     Physical Exam     ACE III MINI         HEENT: Pupils equal reactive ENT clear no asymmetry  NECK: No masses prior thyromegaly  CHEST: Distant but clear without rales  wheezes rhonchi O2 saturation is 92% on room air  CARDIAC: Regular rhythm without gallop or rub  ABD: Liver and spleen are normal positive bowel sounds  :   NEURO: CNS is intact no neuropathy  PSYCH: No anxiety depression  EXTREM: Mild arthritic change trace edema lower legs  Skin: Erythematous skin left lower leg greater than right     Results Review:    9  Procedures    Medication Review: Medications reviewed and noted    Patient wellness counseling  Exercise: Encouraged walking exercise  Diet: Encouraged healthy heart decreased salt diet  Smoking: Non-smoker  Alcohol: None alcohol  Screening:    Assessment/Plan:    Problem List Items Addressed This Visit        Cardiovascular and Mediastinum    Benign essential hypertension    Current Assessment & Plan     Patient is a history of essential hypertension on Coreg 6.25 twice daily Lasix 20 mg daily with initial blood pressure of 158/68 repeated at 118/66 left and right no change in therapy         Relevant Medications    furosemide (LASIX) 20 MG tablet    carvedilol (COREG) 12.5 MG tablet    CHF (congestive heart failure) (CMS/HCC)    Current Assessment & Plan     History of heart failure currently compensated on Coreg 6.25 twice daily Eliquis 5 mg twice daily Lipitor 40 mg daily Lasix 20 mg daily oxygen 2 L at night patient has lost weight down to 157 pounds from 163 she is on low-salt no coughing or wheezing minor effort fatigue and shortness of breath suggest no change in therapy.         Relevant Medications    carvedilol (COREG) 12.5 MG tablet    Mixed hyperlipidemia - Primary    Current Assessment & Plan     History of mixed hyperlipidemia on Lipitor 40 mg daily denies myalgia arthralgia history of coronary disease with recent cardiac arrhythmia currently in sinus rhythm continue atorvastatin 40         Relevant Medications    atorvastatin (LIPITOR) 40 MG tablet    Paroxysmal atrial fibrillation (CMS/HCC)    Overview     Atrial fibrillation noted on EKG  symptomatic on May 20, 2019 refer to cardiology underwent cardioversion is on Eliquis 5 mg twice daily Coreg 6.25 twice daily with current heart rate of 60/min         Current Assessment & Plan     Currently in sinus rhythm having converted June 2019 on Eliquis 5 twice a day Coreg 6.25 twice daily is followed by Dr. Chen         Relevant Medications    carvedilol (COREG) 12.5 MG tablet       Respiratory    Moderate COPD (chronic obstructive pulmonary disease) (CMS/[facility])    Current Assessment & Plan     History of mild COPD on oxygen q. evening occasionally during the day no recent pulmonary infections rare cough rare wheeze mildly short of breath with physical effort no change in oxygen.  With O2 saturation on room air of 92%.              Endocrine    Acquired hypothyroidism    Current Assessment & Plan     Patient with a history of hypothyroidism currently on Cytomel 5 MCG daily and levothyroxine 75 MCG daily no change in therapy         Relevant Medications    liothyronine (CYTOMEL) 5 MCG tablet    carvedilol (COREG) 12.5 MG tablet    levothyroxine (SYNTHROID, LEVOTHROID) 75 MCG tablet       Musculoskeletal and Integument    Skin infection    Overview     Bilateral lower legs left greater than right has been treated with Keflex 503 times a day patient feels it is improving         Current Assessment & Plan     Bilateral lower leg cellulitis left greater than right improved on Keflex therapy prescribed by cardiology.         Relevant Medications    cephalexin (KEFLEX) 500 MG capsule           Patient Instructions   Therapy by cardiology discussed recommend continuing antibiotic for cellulitis of lower legs  Encouraged walking activity  Encouraged continued healthy cardiac diet with reduced salt  Continue current medications  Return visit in 2 months or as needed       Plan of care reviewed with patient at the conclusion of today's visit. Education was provided regarding diagnosis, management, and any prescribed or  recommended OTC medications.Patient verbalizes understanding of and agreement with management plan.         Nj Mckeon MD      Note: Part of this note may be an electronic transcription/translation of spoken language to printed text using the Dragon Dictation system.

## 2019-07-25 NOTE — ASSESSMENT & PLAN NOTE
History of mixed hyperlipidemia on Lipitor 40 mg daily denies myalgia arthralgia history of coronary disease with recent cardiac arrhythmia currently in sinus rhythm continue atorvastatin 40

## 2019-07-25 NOTE — ASSESSMENT & PLAN NOTE
Bilateral lower leg cellulitis left greater than right improved on Keflex therapy prescribed by cardiology.

## 2019-07-25 NOTE — ASSESSMENT & PLAN NOTE
Patient is a history of essential hypertension on Coreg 6.25 twice daily Lasix 20 mg daily with initial blood pressure of 158/68 repeated at 118/66 left and right no change in therapy

## 2019-08-05 RX ORDER — GABAPENTIN 300 MG/1
300 CAPSULE ORAL 2 TIMES DAILY
Qty: 180 CAPSULE | Refills: 0 | Status: SHIPPED | OUTPATIENT
Start: 2019-08-05 | End: 2019-10-29 | Stop reason: SDUPTHER

## 2019-08-05 NOTE — TELEPHONE ENCOUNTER
Last seen-  07/25/2019    Next appointment-  09/26/2019    Last approved-  05/03/2019    [facility]- [email or web address]

## 2019-08-08 RX ORDER — PANTOPRAZOLE SODIUM 40 MG/1
TABLET, DELAYED RELEASE ORAL
Qty: 90 TABLET | Refills: 3 | Status: SHIPPED | OUTPATIENT
Start: 2019-08-08 | End: 2020-07-28 | Stop reason: SDUPTHER

## 2019-09-11 RX ORDER — CARVEDILOL 12.5 MG/1
TABLET ORAL
Qty: 180 TABLET | Refills: 0 | Status: SHIPPED | OUTPATIENT
Start: 2019-09-11 | End: 2019-09-26 | Stop reason: SDUPTHER

## 2019-09-11 NOTE — TELEPHONE ENCOUNTER
This medication was refilled by cardiology on 5/23/19 to the pharmacy that is requesting this ([facility]).  The correct sig is 1/2 tablet BID of 12.5 mg.

## 2019-09-26 ENCOUNTER — OFFICE VISIT (OUTPATIENT)
Dept: INTERNAL MEDICINE | Facility: CLINIC | Age: 84
End: 2019-09-26

## 2019-09-26 VITALS
SYSTOLIC BLOOD PRESSURE: 148 MMHG | HEIGHT: 66 IN | HEART RATE: 60 BPM | BODY MASS INDEX: 25.96 KG/M2 | WEIGHT: 161.5 LBS | OXYGEN SATURATION: 90 % | DIASTOLIC BLOOD PRESSURE: 70 MMHG

## 2019-09-26 DIAGNOSIS — K21.9 GASTROESOPHAGEAL REFLUX DISEASE WITHOUT ESOPHAGITIS: ICD-10-CM

## 2019-09-26 DIAGNOSIS — J44.9 MODERATE COPD (CHRONIC OBSTRUCTIVE PULMONARY DISEASE) (HCC): ICD-10-CM

## 2019-09-26 DIAGNOSIS — E78.2 MIXED HYPERLIPIDEMIA: ICD-10-CM

## 2019-09-26 DIAGNOSIS — I10 BENIGN ESSENTIAL HYPERTENSION: Primary | ICD-10-CM

## 2019-09-26 DIAGNOSIS — I48.0 PAROXYSMAL ATRIAL FIBRILLATION (HCC): ICD-10-CM

## 2019-09-26 PROCEDURE — 99214 OFFICE O/P EST MOD 30 MIN: CPT | Performed by: INTERNAL MEDICINE

## 2019-09-26 RX ORDER — CARBOXYMETHYLCELLULOSE SODIUM 2.5 MG/ML
1 SOLUTION/ DROPS OPHTHALMIC
COMMUNITY
End: 2020-11-04

## 2019-09-26 NOTE — PROGRESS NOTES
[facility]     Amparo Valdes  2/3/1930   [id number]      Patient Care Team:  Nj Mckeon MD as PCP - General  Nj Mckeon MD as PCP - Family Medicine    Chief Complaint::   Chief Complaint   Patient presents with   • Hypertension   • Atrial Fibrillation   • COPD            HPI  The patient is a 89-year-old female with history of hypertension hypothyroidism COPD coronary disease mixed hyperlipidemia on chronic O2 in the evening overall doing reasonably well somewhat breathless during the day when she is not wearing oxygen she has active visits to her home approximately 3 days/week with a good deal of walking and become slightly exhausted.  Patient had a P O2 saturation 89% at rest, no change recent changes in her diet medication or activity.  Denies headache or dizzy denies dysphasia complains of mild effort shortness of breath with physical walking denies chest pain nausea vomiting.      Patient Active Problem List   Diagnosis   • Irritable bowel syndrome with constipation and diarrhea   • Chronic cystitis   • Benign essential hypertension   • ASHD (arteriosclerotic heart disease)   • Moderate COPD (chronic obstructive pulmonary disease) (CMS/HCC)   • Acquired hypothyroidism   • Other specified anemias   • Primary osteoarthritis involving multiple joints   • Advanced age   • Ischemic heart disease   • Gastroesophageal reflux disease without esophagitis   • Arteriosclerosis of coronary artery bypass graft   • Asthenia   • Chronic UTI   • CHF (congestive heart failure) (CMS/HCC)   • Femoral hernia   • First degree atrioventricular block   • Heart disorder   • Herpes zoster   • Hyponatremia   • Iron deficiency anemia   • Low blood pressure   • Mixed hyperlipidemia   • Osteoarthritis   • Parasomnia   • Urethritis   • Paroxysmal atrial fibrillation (CMS/HCC)   • Skin infection        Past Medical History:   Diagnosis Date   • Arthritis    • Cancer, skin, squamous cell 12/27/2012   • Colon polyps  1987    clear 1994   • COPD (chronic obstructive pulmonary disease) (CMS/HCC)    • Family history of coronary artery disease    • Family history of hyperlipidemia    • Family history of hypertension    • Family history of stroke    • Femoral hernia of right side 12/13/2011   • Hyperlipidemia    • Hypertension    • Hypothyroidism    • Osteoarthritis    • Osteoporosis    • Pneumonia    • Rectal fistula     repair   • SBO (small bowel obstruction) (CMS/HCC) 09/13/2012    Had right inguinal hernia repair   • Sepsis (CMS/HCC) 04/2014    - Drug therapy    • Stress incontinence     A and P  repair   • Thyroid disease        Past Surgical History:   Procedure Laterality Date   • ANAL FISTULA REPAIR     • CARDIAC SURGERY  10/2011    stents placed   • CATARACT EXTRACTION     • CORONARY ANGIOPLASTY WITH STENT PLACEMENT  11/2011    x3   • HYSTERECTOMY     • INGUINAL HERNIA REPAIR     • OTHER SURGICAL HISTORY      thyroid uptake   • VAGINAL VAULT SUSPENSION         Family History   Problem Relation Age of Onset   • Stroke Mother    • Heart disease Father    • Hypertension Brother    • Diabetes Brother    • Diabetes Daughter    • Heart disease Daughter    • Hypertension Son    • Hypertension Other    • Coronary artery disease Other    • Stroke Other    • Hyperlipidemia Other    • Other Other         ichthyosis       Social History     Socioeconomic History   • Marital status:      Spouse name: Not on file   • Number of children: Not on file   • Years of education: Not on file   • Highest education level: Not on file   Tobacco Use   • Smoking status: Former Smoker   • Smokeless tobacco: Former User   • Tobacco comment: quit at approx age 60   Substance and Sexual Activity   • Alcohol use: Yes     Comment: rarely   • Drug use: No   • Sexual activity: Defer   Social History Narrative    Caffeine: 2 cups daily       Allergies   Allergen Reactions   • Acetaminophen Unknown (See Comments)     Darvocet- N100   • Baycol  "[Cerivastatin] Unknown (See Comments)     unknown   • Ciprofloxacin Unknown (See Comments)     Unknown   • Ciprofloxacin Hcl Unknown (See Comments)     unknown   • Codeine Unknown (See Comments)     unknown   • Fosamax [Alendronate Sodium] Unknown (See Comments)     unknown   • Meperidine Unknown (See Comments)     unknown   • Methadone Unknown (See Comments)     unknown   • Morphine Unknown (See Comments)     unknown   • Naloxone Unknown (See Comments)     unknown   • Propoxyphene Unknown (See Comments)     darvocet-n 100   • Sulfa Antibiotics Unknown (See Comments)     unknown   • Ubidecarenone GI Intolerance   • Zocor [Simvastatin] Unknown (See Comments)     unknown       Review of Systems     HEENT: Denies headache or dizzy vision or hearing change  NECK: Denies pain stiffness dysphasia  CHEST: Mild effort shortness of breath wearing oxygen at night  CARDIAC: Denies chest pain pressure palpitations history of atrial fib and coronary disease  ABD: Denies nausea vomiting  : Denies dysuria frequency   NEURO: Denies syncope concussion  PSYCH: Denies anxiety depression  EXTREM: Complains of mild arthritic soreness trace of edema, complains of leg cramps at night    Vital Signs  Vitals:    09/26/19 1449 09/26/19 1501   BP: 148/70    BP Location: Left arm    Patient Position: Sitting    Pulse: 60    SpO2: 92%  Comment: Room air.  Had just walked back to exam room 90%  Comment: room air.  After resting x 10 minutes   Weight: 73.3 kg (161 lb 8 oz)    Height: 167 cm (65.75\")    PainSc:   3    PainLoc: Foot  Comment: feet Left > right      Body mass index is 26.27 kg/m².    Current Outpatient Medications:   •  apixaban (ELIQUIS) 5 MG tablet tablet, Take 1 tablet by mouth 2 (Two) Times a Day., Disp: 30 tablet, Rfl: 0  •  atorvastatin (LIPITOR) 40 MG tablet, Take 1 tablet by mouth Daily., Disp: 90 tablet, Rfl: 3  •  calcium carbonate-vitamin d (CALTRATE 600+D) 600-400 MG-UNIT per tablet, Take 1 tablet by mouth Daily., " Disp: , Rfl:   •  Carboxymethylcellulose Sodium (THERATEARS) 0.25 % solution, Apply 1 drop to eye(s) as directed by provider. 1-2 times daily, Disp: , Rfl:   •  carvedilol (COREG) 12.5 MG tablet, Take 0.5 tablets by mouth 2 (Two) Times a Day With Meals., Disp: 180 tablet, Rfl: 3  •  docusate sodium (COLACE) 100 MG capsule, 100 mg Daily., Disp: , Rfl:   •  ferrous sulfate 325 (65 FE) MG tablet, Take 1 tablet by mouth Daily With Breakfast. (Patient taking differently: Take 325 mg by mouth Daily With Breakfast. Takes only 2 x weekly), Disp: 30 tablet, Rfl: 5  •  furosemide (LASIX) 20 MG tablet, Take 20 mg by mouth Daily., Disp: , Rfl:   •  gabapentin (NEURONTIN) 300 MG capsule, Take 1 capsule by mouth 2 (Two) Times a Day., Disp: 180 capsule, Rfl: 0  •  levothyroxine (SYNTHROID, LEVOTHROID) 75 MCG tablet, TAKE 1 TABLET BY MOUTH ONCE DAILY, Disp: 90 tablet, Rfl: 1  •  liothyronine (CYTOMEL) 5 MCG tablet, Take 1 tablet by mouth Daily., Disp: 30 tablet, Rfl: 11  •  Multiple Vitamins-Minerals (CENTRUM SILVER PO), Take 1 tablet by mouth Daily., Disp: , Rfl:   •  O2 (OXYGEN), Inhale 2 L/min Every Night., Disp: , Rfl:   •  pantoprazole (PROTONIX) 40 MG EC tablet, TAKE 1 TABLET BY MOUTH ONCE DAILY, Disp: 90 tablet, Rfl: 3  •  potassium chloride (MICRO-K) 10 MEQ CR capsule, Take 5 mEq by mouth Daily., Disp: , Rfl:   •  trimethoprim (TRIMPEX) 100 MG tablet, trimethoprim 100 mg tablet  TAKE 1 TABLET BY MOUTH EVERY DAY, Disp: , Rfl:     Physical Exam     ACE III MINI         HEENT: Pupils equal reactive no asymmetry  NECK: No masses or bruit  CHEST: Distant but clear few crackles at the base O2 saturation 89% room air at rest  CARDIAC: Regular rhythm without gallop or rub  ABD: Positive bowel sounds liver and spleen normal  :   NEURO: CNS is intact no neuropathy  PSYCH: No anxiety depression  EXTREM: Arthritic changes trace of edema positive peripheral pulses dorsalis pedis and posterior tibials left and right  Skin: Mild venous  stasis     Results Review:    No results found for this or any previous visit (from the past 672 hour(s)).  Procedures    Medication Review: Medications reviewed and noted    Patient wellness counseling  Exercise: Encouraged walking exercise  Diet: Encouraged healthy cardiac diet  Smoking: Non-smoker  Alcohol: Infrequent alcohol  Screening: Saturation 89% on room air    Assessment/Plan:    Problem List Items Addressed This Visit        Cardiovascular and Mediastinum    Benign essential hypertension - Primary    Current Assessment & Plan     Essential hypertension currently on carvedilol 6.25 mg twice daily Lasix 20 mg daily with current blood pressure of 148/70 and heart rate of 60 suggest no change in therapy.         Relevant Medications    furosemide (LASIX) 20 MG tablet    carvedilol (COREG) 12.5 MG tablet    Mixed hyperlipidemia    Current Assessment & Plan     History of mixed hyperlipidemia on atorvastatin 40 mg daily denies myalgia arthralgia continue current therapy         Relevant Medications    atorvastatin (LIPITOR) 40 MG tablet    Paroxysmal atrial fibrillation (CMS/HCC)    Overview     Atrial fibrillation noted on EKG symptomatic on May 20, 2019 refer to cardiology underwent cardioversion is on Eliquis 5 mg twice daily Coreg 6.25 twice daily with current heart rate of 60/min         Current Assessment & Plan     History of paroxysmal atrial fibrillation on Eliquis 5 mg twice daily Coreg 12.5 620 Eliquis 5 mg twice daily denies bleeding rhythm is regular denies chest pain pressure tightness or palpitations.         Relevant Medications    carvedilol (COREG) 12.5 MG tablet       Respiratory    Moderate COPD (chronic obstructive pulmonary disease) (CMS/HCC)    Current Assessment & Plan     History of COPD on oxygen only at night today's saturation is 89% room air have encouraged the patient start wearing oxygen 24/7 day and a portable unit and have recommended the new portable unit [employer]               Digestive    Gastroesophageal reflux disease without esophagitis    Overview     History of GERD and currently stable on Protonix 40 mg daily denies reflux abdominal pain         Relevant Medications    pantoprazole (PROTONIX) 40 MG EC tablet           Patient Instructions   Continue current medical therapy  Suggest wearing oxygen during the day 2 L a minute  Suggest portable O2 and or [employer] portable device  Suggest pure magnesium all spray for legs at at bedtime for nighttime leg cramps  Return visit in 4 months or as needed.       Plan of care reviewed with patient at the conclusion of today's visit. Education was provided regarding diagnosis, management, and any prescribed or recommended OTC medications.Patient verbalizes understanding of and agreement with management plan.         Nj Mckeon MD      Note: Part of this note may be an electronic transcription/translation of spoken language to printed text using the Dragon Dictation system.

## 2019-09-27 NOTE — PATIENT INSTRUCTIONS
Continue current medical therapy  Suggest wearing oxygen during the day 2 L a minute  Suggest portable O2 and or [employer] portable device  Suggest pure magnesium all spray for legs at at bedtime for nighttime leg cramps  Return visit in 4 months or as needed.

## 2019-09-27 NOTE — ASSESSMENT & PLAN NOTE
History of COPD on oxygen only at night today's saturation is 89% room air have encouraged the patient start wearing oxygen 24/7 day and a portable unit and have recommended the new portable unit [employer]

## 2019-09-27 NOTE — ASSESSMENT & PLAN NOTE
Essential hypertension currently on carvedilol 6.25 mg twice daily Lasix 20 mg daily with current blood pressure of 148/70 and heart rate of 60 suggest no change in therapy.

## 2019-09-27 NOTE — ASSESSMENT & PLAN NOTE
History of paroxysmal atrial fibrillation on Eliquis 5 mg twice daily Coreg 12.5 620 Eliquis 5 mg twice daily denies bleeding rhythm is regular denies chest pain pressure tightness or palpitations.

## 2019-09-27 NOTE — ASSESSMENT & PLAN NOTE
History of mixed hyperlipidemia on atorvastatin 40 mg daily denies myalgia arthralgia continue current therapy

## 2019-10-29 RX ORDER — GABAPENTIN 300 MG/1
300 CAPSULE ORAL 2 TIMES DAILY
Qty: 180 CAPSULE | Refills: 0 | Status: SHIPPED | OUTPATIENT
Start: 2019-10-29 | End: 2020-02-03 | Stop reason: SDUPTHER

## 2019-11-13 ENCOUNTER — TRANSCRIBE ORDERS (OUTPATIENT)
Dept: LAB | Facility: HOSPITAL | Age: 84
End: 2019-11-13

## 2019-11-13 ENCOUNTER — LAB (OUTPATIENT)
Dept: LAB | Facility: HOSPITAL | Age: 84
End: 2019-11-13

## 2019-11-13 DIAGNOSIS — D64.9 ANEMIA, UNSPECIFIED TYPE: ICD-10-CM

## 2019-11-13 DIAGNOSIS — I48.91 ATRIAL FIBRILLATION, UNSPECIFIED TYPE (HCC): ICD-10-CM

## 2019-11-13 DIAGNOSIS — E78.00 PURE HYPERCHOLESTEROLEMIA: ICD-10-CM

## 2019-11-13 DIAGNOSIS — G93.39 ICELAND DISEASE: ICD-10-CM

## 2019-11-13 DIAGNOSIS — E78.00 PURE HYPERCHOLESTEROLEMIA: Primary | ICD-10-CM

## 2019-11-13 LAB
ALBUMIN SERPL-MCNC: 4.1 G/DL (ref 3.5–5.2)
ALP SERPL-CCNC: 90 U/L (ref 39–117)
ALT SERPL W P-5'-P-CCNC: 25 U/L (ref 1–33)
ANION GAP SERPL CALCULATED.3IONS-SCNC: 10.6 MMOL/L (ref 5–15)
AST SERPL-CCNC: 25 U/L (ref 1–32)
BILIRUB CONJ SERPL-MCNC: 0.2 MG/DL (ref 0.2–0.3)
BILIRUB INDIRECT SERPL-MCNC: 0.3 MG/DL
BILIRUB SERPL-MCNC: 0.5 MG/DL (ref 0.2–1.2)
BUN BLD-MCNC: 19 MG/DL (ref 8–23)
BUN/CREAT SERPL: 21.1 (ref 7–25)
CALCIUM SPEC-SCNC: 9.7 MG/DL (ref 8.6–10.5)
CHLORIDE SERPL-SCNC: 103 MMOL/L (ref 98–107)
CHOLEST SERPL-MCNC: 141 MG/DL (ref 0–200)
CO2 SERPL-SCNC: 27.4 MMOL/L (ref 22–29)
CREAT BLD-MCNC: 0.9 MG/DL (ref 0.57–1)
DEPRECATED RDW RBC AUTO: 46.5 FL (ref 37–54)
ERYTHROCYTE [DISTWIDTH] IN BLOOD BY AUTOMATED COUNT: 13.1 % (ref 12.3–15.4)
GFR SERPL CREATININE-BSD FRML MDRD: 59 ML/MIN/1.73
GLUCOSE BLD-MCNC: 104 MG/DL (ref 65–99)
HCT VFR BLD AUTO: 38.6 % (ref 34–46.6)
HDLC SERPL-MCNC: 53 MG/DL (ref 40–60)
HGB BLD-MCNC: 12.7 G/DL (ref 12–15.9)
LDLC SERPL CALC-MCNC: 67 MG/DL (ref 0–100)
LDLC/HDLC SERPL: 1.26 {RATIO}
MCH RBC QN AUTO: 32.3 PG (ref 26.6–33)
MCHC RBC AUTO-ENTMCNC: 32.9 G/DL (ref 31.5–35.7)
MCV RBC AUTO: 98.2 FL (ref 79–97)
PLATELET # BLD AUTO: 288 10*3/MM3 (ref 140–450)
PMV BLD AUTO: 10.4 FL (ref 6–12)
POTASSIUM BLD-SCNC: 4.6 MMOL/L (ref 3.5–5.2)
PROT SERPL-MCNC: 7.1 G/DL (ref 6–8.5)
RBC # BLD AUTO: 3.93 10*6/MM3 (ref 3.77–5.28)
SODIUM BLD-SCNC: 141 MMOL/L (ref 136–145)
TRIGL SERPL-MCNC: 105 MG/DL (ref 0–150)
VLDLC SERPL-MCNC: 21 MG/DL (ref 5–40)
WBC NRBC COR # BLD: 6.43 10*3/MM3 (ref 3.4–10.8)

## 2019-11-13 PROCEDURE — 85027 COMPLETE CBC AUTOMATED: CPT

## 2019-11-13 PROCEDURE — 80061 LIPID PANEL: CPT

## 2019-11-13 PROCEDURE — 80076 HEPATIC FUNCTION PANEL: CPT

## 2019-11-13 PROCEDURE — 80048 BASIC METABOLIC PNL TOTAL CA: CPT

## 2019-11-13 PROCEDURE — 36415 COLL VENOUS BLD VENIPUNCTURE: CPT

## 2019-11-21 RX ORDER — FUROSEMIDE 20 MG/1
TABLET ORAL
Qty: 90 TABLET | Refills: 0 | Status: SHIPPED | OUTPATIENT
Start: 2019-11-21 | End: 2020-01-27 | Stop reason: SDUPTHER

## 2019-12-06 ENCOUNTER — TELEPHONE (OUTPATIENT)
Dept: INTERNAL MEDICINE | Facility: CLINIC | Age: 84
End: 2019-12-06

## 2019-12-06 RX ORDER — AMOXICILLIN AND CLAVULANATE POTASSIUM 500; 125 MG/1; MG/1
1 TABLET, FILM COATED ORAL 2 TIMES DAILY
Qty: 16 TABLET | Refills: 0 | Status: SHIPPED | OUTPATIENT
Start: 2019-12-06 | End: 2020-01-27

## 2019-12-06 NOTE — TELEPHONE ENCOUNTER
Patient's daughter, Alyson Valdes, called to request for a prescription to be written for the patient for a respiratory infection. The patient's daughter said that the patient has been blowing her nose a lot after exposure to young family members and the facility where the patient stays called the patient's daughter today (12/6/19) and said that the patient is feverish and overall feeling poorly. The patient's daughter said that this is particularly bad as it could progress quickly due to the patient's heart condition. The patient's daughter also mentioned that the patient is taking ELIQUIS. I confirmed the correct pharmacy with the patient's daughter to be [facility]. The patient's daughter requested a call back at [phone] when this message has been received so that she knows what action will be taken. If there are any other questions or concerns please call the pharmacy at [facility] at [phone].

## 2019-12-06 NOTE — TELEPHONE ENCOUNTER
Called and discussed with pt daughter. She said temp is 100.6 and she has given her mucinex and tylenol. Dr. Mckeon notified and he said to continue those as needed and get started on antibiotic. Message relayed to daughter and she voices understanding

## 2019-12-06 NOTE — TELEPHONE ENCOUNTER
Symptoms noted I sent Augmentin 500 twice daily for 8 days please call patient daughter and let her know I have completed this task

## 2019-12-09 RX ORDER — CARVEDILOL 12.5 MG/1
TABLET ORAL
Qty: 180 TABLET | Refills: 0 | Status: SHIPPED | OUTPATIENT
Start: 2019-12-09 | End: 2020-01-27 | Stop reason: SDUPTHER

## 2020-01-08 ENCOUNTER — TELEPHONE (OUTPATIENT)
Dept: INTERNAL MEDICINE | Facility: CLINIC | Age: 85
End: 2020-01-08

## 2020-01-08 DIAGNOSIS — I10 BENIGN ESSENTIAL HYPERTENSION: Primary | ICD-10-CM

## 2020-01-08 DIAGNOSIS — E78.2 MIXED HYPERLIPIDEMIA: ICD-10-CM

## 2020-01-08 DIAGNOSIS — N39.0 URINARY TRACT INFECTION WITHOUT HEMATURIA, SITE UNSPECIFIED: ICD-10-CM

## 2020-01-08 RX ORDER — LEVOTHYROXINE SODIUM 0.07 MG/1
TABLET ORAL
Qty: 90 TABLET | Refills: 3 | Status: SHIPPED | OUTPATIENT
Start: 2020-01-08 | End: 2020-01-27

## 2020-01-08 NOTE — TELEPHONE ENCOUNTER
Message noted the lab on [facility] is a [facility] and we can send that by electronic,    Obtain fasting CBC CMP and TSH plus a urine analysis with culture and sensitivity.    Do not think we need to mail lab request to daughter and call patient's daughter.

## 2020-01-08 NOTE — TELEPHONE ENCOUNTER
Patient's daughter, Alyson Valdes, called to request written orders for the patient, Amparo Valdes, to have fasting labs performed as well as a urinalysis for a UTI. The patient's daughter stated that the patient will have these labs completed at a facility on [facility]. The patient's daughter said that she cannot remember if it is a lab with [facility] or with [facility]. The patient's daughter requested for these written lab orders to be mailed to her at the following address:    Alyson Valdes  [address] [place] [id number]    If there are any questions or concerns please call the patient's daughter at [phone].

## 2020-01-13 ENCOUNTER — LAB (OUTPATIENT)
Dept: LAB | Facility: HOSPITAL | Age: 85
End: 2020-01-13

## 2020-01-13 DIAGNOSIS — I10 BENIGN ESSENTIAL HYPERTENSION: ICD-10-CM

## 2020-01-13 DIAGNOSIS — E78.2 MIXED HYPERLIPIDEMIA: ICD-10-CM

## 2020-01-13 DIAGNOSIS — N39.0 URINARY TRACT INFECTION WITHOUT HEMATURIA, SITE UNSPECIFIED: ICD-10-CM

## 2020-01-13 LAB
ALBUMIN SERPL-MCNC: 4.2 G/DL (ref 3.5–5.2)
ALBUMIN/GLOB SERPL: 1.4 G/DL
ALP SERPL-CCNC: 100 U/L (ref 39–117)
ALT SERPL W P-5'-P-CCNC: 35 U/L (ref 1–33)
ANION GAP SERPL CALCULATED.3IONS-SCNC: 14.4 MMOL/L (ref 5–15)
AST SERPL-CCNC: 31 U/L (ref 1–32)
BASOPHILS # BLD AUTO: 0.08 10*3/MM3 (ref 0–0.2)
BASOPHILS NFR BLD AUTO: 1 % (ref 0–1.5)
BILIRUB SERPL-MCNC: 0.7 MG/DL (ref 0.2–1.2)
BUN BLD-MCNC: 17 MG/DL (ref 8–23)
BUN/CREAT SERPL: 17.3 (ref 7–25)
CALCIUM SPEC-SCNC: 9.7 MG/DL (ref 8.6–10.5)
CHLORIDE SERPL-SCNC: 96 MMOL/L (ref 98–107)
CO2 SERPL-SCNC: 25.6 MMOL/L (ref 22–29)
CREAT BLD-MCNC: 0.98 MG/DL (ref 0.57–1)
DEPRECATED RDW RBC AUTO: 46.1 FL (ref 37–54)
EOSINOPHIL # BLD AUTO: 0.09 10*3/MM3 (ref 0–0.4)
EOSINOPHIL NFR BLD AUTO: 1.1 % (ref 0.3–6.2)
ERYTHROCYTE [DISTWIDTH] IN BLOOD BY AUTOMATED COUNT: 13 % (ref 12.3–15.4)
GFR SERPL CREATININE-BSD FRML MDRD: 53 ML/MIN/1.73
GLOBULIN UR ELPH-MCNC: 3.1 GM/DL
GLUCOSE BLD-MCNC: 95 MG/DL (ref 65–99)
HCT VFR BLD AUTO: 38.5 % (ref 34–46.6)
HGB BLD-MCNC: 12.8 G/DL (ref 12–15.9)
IMM GRANULOCYTES # BLD AUTO: 0.02 10*3/MM3 (ref 0–0.05)
IMM GRANULOCYTES NFR BLD AUTO: 0.2 % (ref 0–0.5)
LYMPHOCYTES # BLD AUTO: 1.31 10*3/MM3 (ref 0.7–3.1)
LYMPHOCYTES NFR BLD AUTO: 16 % (ref 19.6–45.3)
MCH RBC QN AUTO: 32.2 PG (ref 26.6–33)
MCHC RBC AUTO-ENTMCNC: 33.2 G/DL (ref 31.5–35.7)
MCV RBC AUTO: 96.7 FL (ref 79–97)
MONOCYTES # BLD AUTO: 0.79 10*3/MM3 (ref 0.1–0.9)
MONOCYTES NFR BLD AUTO: 9.7 % (ref 5–12)
NEUTROPHILS # BLD AUTO: 5.89 10*3/MM3 (ref 1.7–7)
NEUTROPHILS NFR BLD AUTO: 72 % (ref 42.7–76)
NRBC BLD AUTO-RTO: 0 /100 WBC (ref 0–0.2)
PLATELET # BLD AUTO: 319 10*3/MM3 (ref 140–450)
PMV BLD AUTO: 10.1 FL (ref 6–12)
POTASSIUM BLD-SCNC: 4.4 MMOL/L (ref 3.5–5.2)
PROT SERPL-MCNC: 7.3 G/DL (ref 6–8.5)
RBC # BLD AUTO: 3.98 10*6/MM3 (ref 3.77–5.28)
SODIUM BLD-SCNC: 136 MMOL/L (ref 136–145)
TSH SERPL DL<=0.05 MIU/L-ACNC: 0.03 UIU/ML (ref 0.27–4.2)
WBC NRBC COR # BLD: 8.18 10*3/MM3 (ref 3.4–10.8)

## 2020-01-13 PROCEDURE — 87086 URINE CULTURE/COLONY COUNT: CPT

## 2020-01-13 PROCEDURE — 80053 COMPREHEN METABOLIC PANEL: CPT

## 2020-01-13 PROCEDURE — 87186 SC STD MICRODIL/AGAR DIL: CPT

## 2020-01-13 PROCEDURE — 81001 URINALYSIS AUTO W/SCOPE: CPT

## 2020-01-13 PROCEDURE — 84443 ASSAY THYROID STIM HORMONE: CPT

## 2020-01-13 PROCEDURE — 85025 COMPLETE CBC W/AUTO DIFF WBC: CPT

## 2020-01-13 PROCEDURE — 87088 URINE BACTERIA CULTURE: CPT

## 2020-01-14 LAB
BACTERIA UR QL AUTO: ABNORMAL /HPF
BILIRUB UR QL STRIP: NEGATIVE
CLARITY UR: ABNORMAL
COLOR UR: YELLOW
GLUCOSE UR STRIP-MCNC: NEGATIVE MG/DL
HGB UR QL STRIP.AUTO: ABNORMAL
HYALINE CASTS UR QL AUTO: ABNORMAL /LPF
KETONES UR QL STRIP: NEGATIVE
LEUKOCYTE ESTERASE UR QL STRIP.AUTO: ABNORMAL
NITRITE UR QL STRIP: POSITIVE
PH UR STRIP.AUTO: 7 [PH] (ref 5–8)
PROT UR QL STRIP: ABNORMAL
RBC # UR: ABNORMAL /HPF
REF LAB TEST METHOD: ABNORMAL
SP GR UR STRIP: 1.01 (ref 1–1.03)
SQUAMOUS #/AREA URNS HPF: ABNORMAL /HPF
UROBILINOGEN UR QL STRIP: ABNORMAL
WBC UR QL AUTO: ABNORMAL /HPF
YEAST URNS QL MICRO: ABNORMAL /HPF

## 2020-01-16 LAB — BACTERIA SPEC AEROBE CULT: ABNORMAL

## 2020-01-16 RX ORDER — NITROFURANTOIN 25; 75 MG/1; MG/1
100 CAPSULE ORAL 2 TIMES DAILY
Qty: 14 CAPSULE | Refills: 0 | Status: SHIPPED | OUTPATIENT
Start: 2020-01-16 | End: 2020-01-23

## 2020-01-27 ENCOUNTER — OFFICE VISIT (OUTPATIENT)
Dept: INTERNAL MEDICINE | Facility: CLINIC | Age: 85
End: 2020-01-27

## 2020-01-27 VITALS
HEART RATE: 60 BPM | HEIGHT: 66 IN | OXYGEN SATURATION: 95 % | BODY MASS INDEX: 24.91 KG/M2 | DIASTOLIC BLOOD PRESSURE: 70 MMHG | SYSTOLIC BLOOD PRESSURE: 120 MMHG | WEIGHT: 155 LBS

## 2020-01-27 DIAGNOSIS — I48.19 PERSISTENT ATRIAL FIBRILLATION (HCC): ICD-10-CM

## 2020-01-27 DIAGNOSIS — I10 BENIGN ESSENTIAL HYPERTENSION: ICD-10-CM

## 2020-01-27 DIAGNOSIS — I48.0 PAROXYSMAL ATRIAL FIBRILLATION (HCC): ICD-10-CM

## 2020-01-27 DIAGNOSIS — N39.0 CHRONIC UTI: ICD-10-CM

## 2020-01-27 DIAGNOSIS — E78.2 MIXED HYPERLIPIDEMIA: ICD-10-CM

## 2020-01-27 DIAGNOSIS — E03.9 ACQUIRED HYPOTHYROIDISM: ICD-10-CM

## 2020-01-27 DIAGNOSIS — K58.2 IRRITABLE BOWEL SYNDROME WITH CONSTIPATION AND DIARRHEA: Primary | ICD-10-CM

## 2020-01-27 PROCEDURE — G0439 PPPS, SUBSEQ VISIT: HCPCS | Performed by: INTERNAL MEDICINE

## 2020-01-27 RX ORDER — POTASSIUM CHLORIDE 750 MG/1
CAPSULE, EXTENDED RELEASE ORAL
Qty: 45 CAPSULE | Refills: 3 | Status: SHIPPED | OUTPATIENT
Start: 2020-01-27 | End: 2020-11-04 | Stop reason: SDUPTHER

## 2020-01-27 RX ORDER — TRIMETHOPRIM 100 MG/1
100 TABLET ORAL DAILY
Qty: 90 TABLET | Refills: 3 | Status: SHIPPED | OUTPATIENT
Start: 2020-01-27 | End: 2020-04-03 | Stop reason: SDUPTHER

## 2020-01-27 RX ORDER — LIOTHYRONINE SODIUM 5 UG/1
5 TABLET ORAL DAILY
Qty: 90 TABLET | Refills: 3 | Status: SHIPPED | OUTPATIENT
Start: 2020-01-27 | End: 2020-06-15 | Stop reason: SDUPTHER

## 2020-01-27 RX ORDER — LEVOTHYROXINE SODIUM 0.05 MG/1
TABLET ORAL
Qty: 90 TABLET | Refills: 3 | Status: SHIPPED | OUTPATIENT
Start: 2020-01-27 | End: 2021-02-04 | Stop reason: SDUPTHER

## 2020-01-27 RX ORDER — FUROSEMIDE 20 MG/1
20 TABLET ORAL DAILY
Qty: 90 TABLET | Refills: 3 | Status: SHIPPED | OUTPATIENT
Start: 2020-01-27 | End: 2020-11-04 | Stop reason: SDUPTHER

## 2020-01-27 RX ORDER — CARVEDILOL 6.25 MG/1
6.25 TABLET ORAL 2 TIMES DAILY WITH MEALS
Qty: 180 TABLET | Refills: 3 | Status: SHIPPED | OUTPATIENT
Start: 2020-01-27 | End: 2021-02-04 | Stop reason: SDUPTHER

## 2020-01-27 RX ORDER — ATORVASTATIN CALCIUM 40 MG/1
40 TABLET, FILM COATED ORAL DAILY
Qty: 90 TABLET | Refills: 3 | Status: SHIPPED | OUTPATIENT
Start: 2020-01-27 | End: 2020-06-15 | Stop reason: SDUPTHER

## 2020-01-27 RX ORDER — CARVEDILOL 12.5 MG/1
6.25 TABLET ORAL 2 TIMES DAILY WITH MEALS
Qty: 180 TABLET | Refills: 3 | Status: SHIPPED | OUTPATIENT
Start: 2020-01-27 | End: 2020-01-27

## 2020-01-27 NOTE — PROGRESS NOTES
QUICK REFERENCE INFORMATION:  The ABCs of the Annual Wellness Visit    Subsequent Medicare Wellness Visit    HEALTH RISK ASSESSMENT    2/3/1930    Recent Hospitalizations:  No hospitalization(s) within the last year..        Current Medical Providers:  Patient Care Team:  Nj Mckeon MD as PCP - General  Nj Mckeon MD as PCP - Family Medicine  Harjeet Martinez MD as Consulting Physician (Interventional Cardiology)        Smoking Status:  Social History     Tobacco Use   Smoking Status Former Smoker   Smokeless Tobacco Former User   Tobacco Comment    quit at approx age 60       Alcohol Consumption:  Social History     Substance and Sexual Activity   Alcohol Use Yes    Comment: rarely       Depression Screen:   PHQ-2/PHQ-9 Depression Screening 1/27/2020   Little interest or pleasure in doing things 0   Feeling down, depressed, or hopeless 0   Total Score 0       Health Habits and Functional and Cognitive Screening:  Functional & Cognitive Status 1/27/2020   Do you have difficulty preparing food and eating? No   Do you have difficulty bathing yourself, getting dressed or grooming yourself? No   Do you have difficulty using the toilet? No   Do you have difficulty moving around from place to place? No   Do you have trouble with steps or getting out of a bed or a chair? Yes   Current Diet Well Balanced Diet   Dental Exam Not up to date   Eye Exam Up to date   Exercise (times per week) 0 times per week   Current Exercise Activities Include None   Do you need help using the phone?  No   Are you deaf or do you have serious difficulty hearing?  Yes   Do you need help with transportation? Yes   Do you need help shopping? Yes   Do you need help preparing meals?  No   Do you need help with housework?  No   Do you need help with laundry? No   Do you need help taking your medications? No   Do you need help managing money? No   Do you ever drive or ride in a car without wearing a seat belt? No   Have you felt unusual stress,  anger or loneliness in the last month? No   Who do you live with? Community   If you need help, do you have trouble finding someone available to you? No   Have you been bothered in the last four weeks by sexual problems? No   Do you have difficulty concentrating, remembering or making decisions? No       Fall Risk Screen:  JADEN Fall Risk Assessment was completed, and patient is at LOW risk for falls.Assessment completed on:1/27/2020    ACE III MINI        Does the patient have evidence of cognitive impairment? No    Aspirin use counseling: Does not need ASA (and currently is not on it)    Recent Lab Results:  CMP:  Lab Results   Component Value Date    BUN 17 01/13/2020    CREATININE 0.98 01/13/2020    EGFRIFNONA 53 (L) 01/13/2020    BCR 17.3 01/13/2020     01/13/2020    K 4.4 01/13/2020    CO2 25.6 01/13/2020    CALCIUM 9.7 01/13/2020    ALBUMIN 4.20 01/13/2020    BILITOT 0.7 01/13/2020    ALKPHOS 100 01/13/2020    AST 31 01/13/2020    ALT 35 (H) 01/13/2020     HbA1c:  Lab Results   Component Value Date    HGBA1C 6.40 (H) 12/04/2017     Microalbumin:  No results found for: MICROALBUR, POCMALB, POCCREAT  Lipid Panel  Lab Results   Component Value Date    CHOL 141 11/13/2019    TRIG 105 11/13/2019    HDL 53 11/13/2019    LDL 67 11/13/2019    AST 31 01/13/2020    ALT 35 (H) 01/13/2020       Visual Acuity:  No exam data present    Age-appropriate Screening Schedule:  Refer to the list below for future screening recommendations based on patient's age, sex and/or medical conditions. Orders for these recommended tests are listed in the plan section. The patient has been provided with a written plan.    Health Maintenance   Topic Date Due   • TDAP/TD VACCINES (1 - Tdap) 02/03/1941   • ZOSTER VACCINE (1 of 2) 02/03/1980   • DXA SCAN  01/25/2019   • INFLUENZA VACCINE  08/01/2019   • LIPID PANEL  11/13/2020   • PNEUMOCOCCAL VACCINE (65+ HIGH RISK)  Completed        Subjective   History of Present Illness    Amparo WALKER  Keisha is a 89 y.o. female who presents for a Subsequent Wellness Visit.    CHRONIC CONDITIONS    The following portions of the patient's history were reviewed and updated as appropriate: allergies, current medications, past family history, past medical history, past social history and past surgical history.    Outpatient Medications Prior to Visit   Medication Sig Dispense Refill   • calcium carbonate-vitamin d (CALTRATE 600+D) 600-400 MG-UNIT per tablet Take 1 tablet by mouth Daily.     • Carboxymethylcellulose Sodium (THERATEARS) 0.25 % solution Apply 1 drop to eye(s) as directed by provider. 1-2 times daily     • docusate sodium (COLACE) 100 MG capsule 100 mg Daily.     • gabapentin (NEURONTIN) 300 MG capsule Take 1 capsule by mouth 2 (Two) Times a Day. 180 capsule 0   • Multiple Vitamins-Minerals (CENTRUM SILVER PO) Take 1 tablet by mouth Daily.     • O2 (OXYGEN) Inhale 2 L/min Every Night.     • pantoprazole (PROTONIX) 40 MG EC tablet TAKE 1 TABLET BY MOUTH ONCE DAILY 90 tablet 3   • apixaban (ELIQUIS) 5 MG tablet tablet Take 1 tablet by mouth 2 (Two) Times a Day. 30 tablet 0   • atorvastatin (LIPITOR) 40 MG tablet Take 1 tablet by mouth Daily. 90 tablet 3   • carvedilol (COREG) 12.5 MG tablet Take 0.5 tablets by mouth 2 (Two) Times a Day With Meals. 180 tablet 3   • furosemide (LASIX) 20 MG tablet TAKE 1 TABLET BY MOUTH ONCE DAILY 90 tablet 0   • levothyroxine (SYNTHROID, LEVOTHROID) 75 MCG tablet TAKE 1 TABLET BY MOUTH ONCE DAILY 90 tablet 3   • liothyronine (CYTOMEL) 5 MCG tablet Take 1 tablet by mouth Daily. 30 tablet 11   • potassium chloride (MICRO-K) 10 MEQ CR capsule Take 5 mEq by mouth Daily.     • trimethoprim (TRIMPEX) 100 MG tablet trimethoprim 100 mg tablet   TAKE 1 TABLET BY MOUTH EVERY DAY     • amoxicillin-clavulanate (AUGMENTIN) 500-125 MG per tablet Take 1 tablet by mouth 2 (Two) Times a Day. 16 tablet 0   • carvedilol (COREG) 12.5 MG tablet TAKE 1 TABLET BY MOUTH TWICE A DAY WITH FOOD 180  tablet 0   • ferrous sulfate 325 (65 FE) MG tablet Take 1 tablet by mouth Daily With Breakfast. (Patient taking differently: Take 325 mg by mouth Daily With Breakfast. Takes only 2 x weekly) 30 tablet 5     No facility-administered medications prior to visit.        Patient Active Problem List   Diagnosis   • Irritable bowel syndrome with constipation and diarrhea   • Chronic cystitis   • Benign essential hypertension   • ASHD (arteriosclerotic heart disease)   • Moderate COPD (chronic obstructive pulmonary disease) (CMS/[facility])   • Acquired hypothyroidism   • Other specified anemias   • Primary osteoarthritis involving multiple joints   • Advanced age   • Ischemic heart disease   • Gastroesophageal reflux disease without esophagitis   • Arteriosclerosis of coronary artery bypass graft   • Asthenia   • Chronic UTI   • CHF (congestive heart failure) (CMS/[facility])   • Femoral hernia   • First degree atrioventricular block   • Heart disorder   • Herpes zoster   • Hyponatremia   • Iron deficiency anemia   • Low blood pressure   • Mixed hyperlipidemia   • Osteoarthritis   • Parasomnia   • Urethritis   • Paroxysmal atrial fibrillation (CMS/[facility])   • Skin infection       Advance Care Planning:  Patient has an advance directive - a copy has not been provided. Have asked the patient to send this to us to add to record    Identification of Risk Factors:  Risk factors include: Cardiovascular risk.    Review of Systems   Constitutional: Negative.  Negative for activity change, appetite change, chills, diaphoresis, fatigue and fever.   HENT: Negative.  Negative for facial swelling, mouth sores, sinus pressure and sore throat.    Eyes: Positive for visual disturbance.   Respiratory: Negative.  Negative for cough, choking, chest tightness, shortness of breath and wheezing.    Cardiovascular: Negative.  Negative for chest pain, palpitations and leg swelling.   Gastrointestinal: Negative.  Negative for abdominal pain, blood in stool,  diarrhea and rectal pain.   Endocrine: Negative.  Negative for cold intolerance and heat intolerance.   Genitourinary: Positive for dysuria and frequency. Negative for enuresis, hematuria, pelvic pain, urgency, vaginal bleeding and vaginal pain.   Musculoskeletal: Positive for arthralgias, back pain and neck pain.   Skin: Negative.    Allergic/Immunologic: Negative.    Neurological: Negative.  Negative for dizziness, tremors, syncope, weakness, light-headedness and headaches.   Hematological: Negative.    Psychiatric/Behavioral: Negative.        Compared to one year ago, the patient feels her physical health is the same.  Compared to one year ago, the patient feels her mental health is the same.    Objective     Physical Exam   Constitutional: She is oriented to person, place, and time. She appears well-developed and well-nourished.   HENT:   Head: Normocephalic.   Nose: Nose normal.   Mouth/Throat: Oropharynx is clear and moist.   Eyes: Pupils are equal, round, and reactive to light. Conjunctivae and EOM are normal.   Neck: Normal range of motion. Neck supple. No thyromegaly present.   Cardiovascular: Normal rate and intact distal pulses. Exam reveals no gallop and no friction rub.   Murmur heard.  Pulmonary/Chest: Effort normal and breath sounds normal. No respiratory distress. She has no wheezes. She has no rales.   Abdominal: Soft. Bowel sounds are normal. She exhibits no mass. There is no tenderness. No hernia.   Musculoskeletal: Normal range of motion. She exhibits no edema.   Primary arthritis of the left knee and the right ankle currently stable on PRN Tylenol   Lymphadenopathy:     She has no cervical adenopathy.   Neurological: She is alert and oriented to person, place, and time. No sensory deficit.   Skin: Skin is warm and dry. Capillary refill takes less than 2 seconds.   Psychiatric: She has a normal mood and affect. Her behavior is normal. Judgment and thought content normal.   Vitals reviewed.    "    Procedures Patient Wellness Counseling:   Plan of care reviewed with patient at the conclusion of today's visit. Education was provided in regards to diagnosis, diet and exercise, cervical cancer screening, self breast exams, breast cancer screening, and the importance of yearly mammograms.   Nutrition, family planning/contraception, physical activity, healthy weight,ways to reduce stress, adequate sleep, injury prevention, misuse of tobacco, alcohol and drugs, sexual behavior and STD's, dental health, mental health, and immunizations.    Management and any prescribed or recommended OTC medications.  Patient verbalizes understanding of and agreement with management plan.      Vitals:    01/27/20 1429   BP: 120/70   BP Location: Left arm   Patient Position: Sitting   Cuff Size: Adult   Pulse: 60  Comment: irregular   SpO2: 95%   Weight: 70.3 kg (155 lb)   Height: 167.6 cm (66\")   PainSc: 0-No pain       Patient's Body mass index is 25.02 kg/m². BMI is within normal parameters. No follow-up required..      Assessment/Plan   Problem List Items Addressed This Visit        Cardiovascular and Mediastinum    Benign essential hypertension    Overview     History of essential hypertension with current blood pressure 120/70 on carvedilol 6.25 twice daily Lasix 20 mg daily blood pressure is in the low normal range the patient is asymptomatic denying headache dizzy or lightheadedness and no falls we will continue current therapy.         Current Assessment & Plan     Blood pressure currently 120/70 left and right sitting denying headache dizzy lightheadedness or falls she is on Lasix 20 mg daily and on carvedilol 6.25 twice daily asymptomatic with his blood pressure no change in therapy         Relevant Medications    furosemide (LASIX) 20 MG tablet    carvedilol (COREG) 6.25 MG tablet    Mixed hyperlipidemia    Overview     History of mixed hyperlipidemia on atorvastatin 40 mg daily denies myalgia arthralgia         " Current Assessment & Plan     Current cholesterol is 141 continue atorvastatin 40 mg daily         Relevant Medications    atorvastatin (LIPITOR) 40 MG tablet    Paroxysmal atrial fibrillation (CMS/HCC)    Overview     Atrial fibrillation noted on EKG symptomatic on May 20, 2019 refer to cardiology underwent cardioversion is on Eliquis 5 mg twice daily Coreg 6.25 twice daily with current heart rate of 60/min         Current Assessment & Plan     Patient continues with atrial flutter with variable AV block conduction carvedilol 6.25 twice daily Eliquis 5 mg daily currently stable continue therapy         Relevant Medications    carvedilol (COREG) 6.25 MG tablet       Digestive    Irritable bowel syndrome with constipation and diarrhea - Primary       Endocrine    Acquired hypothyroidism    Overview     History of hypothyroidism on Synthroid 75 MCG daily patient's TSH is low will reduce the Synthroid to 50 MCG daily and continue the Cytomel at 5 mg daily         Current Assessment & Plan     Continue Cytomel 5 mg daily and reduce the Synthroid from 75 MCG to 50 MCG daily         Relevant Medications    liothyronine (CYTOMEL) 5 MCG tablet    levothyroxine (SYNTHROID, LEVOTHROID) 50 MCG tablet    carvedilol (COREG) 6.25 MG tablet       Genitourinary    Chronic UTI    Overview     Recent urinary tract infection with E. coli treated with Macrodantin for 8 days will resume the trimethoprim 100 mg daily for suppression         Current Assessment & Plan     Recent UTI of E. coli treated will return to trimethoprim 100 mg daily for suppression           Other Visit Diagnoses     Persistent atrial fibrillation        Relevant Medications    apixaban (ELIQUIS) 5 MG tablet tablet    carvedilol (COREG) 6.25 MG tablet        Patient Self-Management and Personalized Health Advice  The patient has been provided with information about: fall prevention and preventive services including:   · Annual Wellness Visit (AWV).    Outpatient  Encounter Medications as of 1/27/2020   Medication Sig Dispense Refill   • apixaban (ELIQUIS) 5 MG tablet tablet Take 1 tablet by mouth 2 (Two) Times a Day. 180 tablet 3   • atorvastatin (LIPITOR) 40 MG tablet Take 1 tablet by mouth Daily. 90 tablet 3   • calcium carbonate-vitamin d (CALTRATE 600+D) 600-400 MG-UNIT per tablet Take 1 tablet by mouth Daily.     • Carboxymethylcellulose Sodium (THERATEARS) 0.25 % solution Apply 1 drop to eye(s) as directed by provider. 1-2 times daily     • docusate sodium (COLACE) 100 MG capsule 100 mg Daily.     • furosemide (LASIX) 20 MG tablet Take 1 tablet by mouth Daily. 90 tablet 3   • gabapentin (NEURONTIN) 300 MG capsule Take 1 capsule by mouth 2 (Two) Times a Day. 180 capsule 0   • levothyroxine (SYNTHROID, LEVOTHROID) 50 MCG tablet Note chg dose to 50 mcg from 75 90 tablet 3   • liothyronine (CYTOMEL) 5 MCG tablet Take 1 tablet by mouth Daily. 90 tablet 3   • Multiple Vitamins-Minerals (CENTRUM SILVER PO) Take 1 tablet by mouth Daily.     • O2 (OXYGEN) Inhale 2 L/min Every Night.     • pantoprazole (PROTONIX) 40 MG EC tablet TAKE 1 TABLET BY MOUTH ONCE DAILY 90 tablet 3   • potassium chloride (MICRO-K) 10 MEQ CR capsule Take 1/2 tablet daily 45 capsule 3   • trimethoprim (TRIMPEX) 100 MG tablet Take 1 tablet by mouth Daily. 90 tablet 3   • [DISCONTINUED] apixaban (ELIQUIS) 5 MG tablet tablet Take 1 tablet by mouth 2 (Two) Times a Day. 30 tablet 0   • [DISCONTINUED] atorvastatin (LIPITOR) 40 MG tablet Take 1 tablet by mouth Daily. 90 tablet 3   • [DISCONTINUED] carvedilol (COREG) 12.5 MG tablet Take 0.5 tablets by mouth 2 (Two) Times a Day With Meals. 180 tablet 3   • [DISCONTINUED] carvedilol (COREG) 12.5 MG tablet Take 0.5 tablets by mouth 2 (Two) Times a Day With Meals. 180 tablet 3   • [DISCONTINUED] furosemide (LASIX) 20 MG tablet TAKE 1 TABLET BY MOUTH ONCE DAILY 90 tablet 0   • [DISCONTINUED] levothyroxine (SYNTHROID, LEVOTHROID) 75 MCG tablet TAKE 1 TABLET BY MOUTH  ONCE DAILY 90 tablet 3   • [DISCONTINUED] liothyronine (CYTOMEL) 5 MCG tablet Take 1 tablet by mouth Daily. 30 tablet 11   • [DISCONTINUED] potassium chloride (MICRO-K) 10 MEQ CR capsule Take 5 mEq by mouth Daily.     • [DISCONTINUED] trimethoprim (TRIMPEX) 100 MG tablet trimethoprim 100 mg tablet   TAKE 1 TABLET BY MOUTH EVERY DAY     • carvedilol (COREG) 6.25 MG tablet Take 1 tablet by mouth 2 (Two) Times a Day With Meals. 180 tablet 3   • [DISCONTINUED] amoxicillin-clavulanate (AUGMENTIN) 500-125 MG per tablet Take 1 tablet by mouth 2 (Two) Times a Day. 16 tablet 0   • [DISCONTINUED] carvedilol (COREG) 12.5 MG tablet TAKE 1 TABLET BY MOUTH TWICE A DAY WITH FOOD 180 tablet 0   • [DISCONTINUED] ferrous sulfate 325 (65 FE) MG tablet Take 1 tablet by mouth Daily With Breakfast. (Patient taking differently: Take 325 mg by mouth Daily With Breakfast. Takes only 2 x weekly) 30 tablet 5     No facility-administered encounter medications on file as of 1/27/2020.        Reviewed use of high risk medication in the elderly: yes  Reviewed for potential of harmful drug interactions in the elderly: yes    Follow Up:  Return in about 6 months (around 7/27/2020) for Recheck.     Patient Instructions   Recent fasting labs discussed  Coreg changed to 6.25 mg tablet twice daily from half tablet of 12.5 mg twice daily  Reduce Synthroid from 75 MCG daily to 50 MCG daily  Continue all other therapy  Prescriptions were sent 90-day supply  Continue walking activity and oxygen and healthy cardiac diet  Return visit in 6 months or as needed      An After Visit Summary and PPPS with all of these plans were given to the patient.

## 2020-01-28 NOTE — ASSESSMENT & PLAN NOTE
Blood pressure currently 120/70 left and right sitting denying headache dizzy lightheadedness or falls she is on Lasix 20 mg daily and on carvedilol 6.25 twice daily asymptomatic with his blood pressure no change in therapy

## 2020-01-28 NOTE — ASSESSMENT & PLAN NOTE
Patient continues with atrial flutter with variable AV block conduction carvedilol 6.25 twice daily Eliquis 5 mg daily currently stable continue therapy

## 2020-01-28 NOTE — PATIENT INSTRUCTIONS
Recent fasting labs discussed  Coreg changed to 6.25 mg tablet twice daily from half tablet of 12.5 mg twice daily  Reduce Synthroid from 75 MCG daily to 50 MCG daily  Continue all other therapy  Prescriptions were sent 90-day supply  Continue walking activity and oxygen and healthy cardiac diet  Return visit in 6 months or as needed

## 2020-02-03 ENCOUNTER — TELEPHONE (OUTPATIENT)
Dept: INTERNAL MEDICINE | Facility: CLINIC | Age: 85
End: 2020-02-03

## 2020-02-03 DIAGNOSIS — G62.9 NEUROPATHY: Primary | ICD-10-CM

## 2020-02-03 RX ORDER — GABAPENTIN 300 MG/1
300 CAPSULE ORAL 2 TIMES DAILY
Qty: 180 CAPSULE | Refills: 0 | Status: SHIPPED | OUTPATIENT
Start: 2020-02-03 | End: 2020-04-03 | Stop reason: SDUPTHER

## 2020-02-03 NOTE — TELEPHONE ENCOUNTER
Pt called to get a refill of gabapentin (NEURONTIN) 300 MG capsule. PT says that she has been without it all weekend.    [address]

## 2020-02-03 NOTE — TELEPHONE ENCOUNTER
Last refill: 10/29/19 #180/0 Last office visit:1/27/20  Next office visit: 7/27/20 Carrillo: in process

## 2020-03-05 RX ORDER — CARVEDILOL 12.5 MG/1
TABLET ORAL
Qty: 180 TABLET | Refills: 0 | OUTPATIENT
Start: 2020-03-05

## 2020-03-19 ENCOUNTER — TELEPHONE (OUTPATIENT)
Dept: INTERNAL MEDICINE | Facility: CLINIC | Age: 85
End: 2020-03-19

## 2020-03-19 NOTE — TELEPHONE ENCOUNTER
Message noted, if the patient has had no trauma do not believe an x-ray would be beneficial with suggest elevation heat and rest and holding the Eliquis for at least 2 days.

## 2020-03-19 NOTE — TELEPHONE ENCOUNTER
Daughter states this is 3-4 days old.  No blunt trauma to area, but pt reports that she did do some exercise and that she may have pulled something.  Daughter had forgotten she was on eliquis, however very concerned about holding that.  States the a fib really causes SOB.  She was going to call her mother to find out if leg is warm or cold to touch and if any numbness or tingling in affected leg.  States she is having trouble getting up, but not sure if it is due to the area behind knee or in general.  She is unable to upload photo on [employer], but says she would drive to [place] to show you if you wanted her too.   I did let her know to use heat to area since 3-4 days old.

## 2020-03-19 NOTE — TELEPHONE ENCOUNTER
Tamiko notified and verbalized understanding. Her mother had told her pain was actually better today.   Does know to call if pain worsens or does not get better.

## 2020-03-19 NOTE — TELEPHONE ENCOUNTER
Patients daughter is calling stating that the patient might have some internal bleeding behind her knee looks like a giant bruise that is black and blue and red. Patients daughter has some photos of her leg and would like to send those in and see what kind of treatment she can get. Please advise and call back    DAUGHTER HOANG IS ON  [employer]     [phone]

## 2020-04-03 ENCOUNTER — TELEPHONE (OUTPATIENT)
Dept: INTERNAL MEDICINE | Facility: CLINIC | Age: 85
End: 2020-04-03

## 2020-04-03 DIAGNOSIS — G62.9 NEUROPATHY: ICD-10-CM

## 2020-04-03 RX ORDER — GABAPENTIN 300 MG/1
300 CAPSULE ORAL 2 TIMES DAILY
Qty: 180 CAPSULE | Refills: 0 | Status: SHIPPED | OUTPATIENT
Start: 2020-04-03 | End: 2020-06-15 | Stop reason: SDUPTHER

## 2020-04-03 RX ORDER — TRIMETHOPRIM 100 MG/1
100 TABLET ORAL DAILY
Qty: 90 TABLET | Refills: 3 | Status: SHIPPED | OUTPATIENT
Start: 2020-04-03 | End: 2021-01-06 | Stop reason: SDUPTHER

## 2020-04-03 NOTE — TELEPHONE ENCOUNTER
Med Refill    Patients daughter called in to request a prescription to be called in for an antibiotic  to be called in for a UTI.  Please return call and advise.

## 2020-04-03 NOTE — TELEPHONE ENCOUNTER
Need refill on her Trimethoprim - her botttle says no refills.  She states she takes this for urinary tract infections daily.

## 2020-04-13 ENCOUNTER — TELEPHONE (OUTPATIENT)
Dept: INTERNAL MEDICINE | Facility: CLINIC | Age: 85
End: 2020-04-13

## 2020-04-13 NOTE — TELEPHONE ENCOUNTER
Pt's daughter called and stated that her mother wants to be sure of which dosage is correct on her levothyroxine.  Pt's daughter states that she was taking .5 MG and now her prescription states .75 MG of   Levothyroxine.  Please verify dosage.    Pt's daughter callback: [phone]    Pt callback: [phone]    Please advise.

## 2020-04-13 NOTE — TELEPHONE ENCOUNTER
I called patient.  She is currently taking 50 mcg of levothyroxine but has a filled RX for 75 mcg.  She wants clarification of which does she is to take.

## 2020-05-21 ENCOUNTER — TRANSITIONAL CARE MANAGEMENT TELEPHONE ENCOUNTER (OUTPATIENT)
Dept: CALL CENTER | Facility: HOSPITAL | Age: 85
End: 2020-05-21

## 2020-05-21 ENCOUNTER — READMISSION MANAGEMENT (OUTPATIENT)
Dept: CALL CENTER | Facility: HOSPITAL | Age: 85
End: 2020-05-21

## 2020-05-21 NOTE — OUTREACH NOTE
Prep Survey      Responses   [facility] patient discharged from?  Non-BH   Is LACE score < 7 ?  Non-BH Discharge   Eligibility  [facility]   Date of Discharge  05/20/20   Does the patient have one of the following disease processes/diagnoses(primary or secondary)?  Non-BH Discharge   Prep survey completed?  Yes          Alexandria Green RN

## 2020-05-21 NOTE — OUTREACH NOTE
Call Center TCM Note      Responses   [facility] patient discharged from?  Non-   Does the patient have one of the following disease processes/diagnoses(primary or secondary)?  Non- Discharge   TCM attempt successful?  Yes   Call start time  1329   Call end time  1331   Meds reviewed with patient/caregiver?  Yes   Is the patient having any side effects they believe may be caused by any medication additions or changes?  No   Does the patient have all medications ordered at discharge?  Yes   Is the patient taking all medications as directed (includes completed medication regime)?  Yes   Does the patient have a primary care provider?   Yes   Does the patient have an appointment with their PCP within 7 days of discharge?  Yes   Comments regarding PCP  Nj Mckeon MD   Has the patient kept scheduled appointments due by today?  Yes   Has home health visited the patient within 72 hours of discharge?  N/A   Did the patient receive a copy of their discharge instructions?  Yes   Nursing interventions  Reviewed instructions with patient   What is the patient's perception of their health status since discharge?  Improving   Is the patient/caregiver able to teach back signs and symptoms related to disease process for when to call PCP?  Yes   Is the patient/caregiver able to teach back signs and symptoms related to disease process for when to call 911?  Yes   Is the patient/caregiver able to teach back the hierarchy of who to call/visit for symptoms/problems? PCP, Specialist, Home health nurse, Urgent Care, ED, 911  Yes   TCM call completed?  Yes   Wrap up additional comments  Pt states she is feeling better, slept well last night, eating and drinking well. Confirmed receipt and understanding of medications. Pt has TCM fwp with PCP on 05/26/2020.          Roxana Gibson MA    5/21/2020, 13:33

## 2020-05-26 ENCOUNTER — OFFICE VISIT (OUTPATIENT)
Dept: INTERNAL MEDICINE | Facility: CLINIC | Age: 85
End: 2020-05-26

## 2020-05-26 VITALS
TEMPERATURE: 97.8 F | HEIGHT: 66 IN | SYSTOLIC BLOOD PRESSURE: 130 MMHG | HEART RATE: 56 BPM | BODY MASS INDEX: 23.88 KG/M2 | DIASTOLIC BLOOD PRESSURE: 74 MMHG | WEIGHT: 148.6 LBS

## 2020-05-26 DIAGNOSIS — I10 BENIGN ESSENTIAL HYPERTENSION: ICD-10-CM

## 2020-05-26 DIAGNOSIS — I48.0 PAROXYSMAL ATRIAL FIBRILLATION (HCC): Primary | ICD-10-CM

## 2020-05-26 DIAGNOSIS — J44.9 MODERATE COPD (CHRONIC OBSTRUCTIVE PULMONARY DISEASE) (HCC): ICD-10-CM

## 2020-05-26 DIAGNOSIS — J96.21 ACUTE ON CHRONIC RESPIRATORY FAILURE WITH HYPOXIA (HCC): ICD-10-CM

## 2020-05-26 DIAGNOSIS — E78.2 MIXED HYPERLIPIDEMIA: ICD-10-CM

## 2020-05-26 PROCEDURE — 99496 TRANSJ CARE MGMT HIGH F2F 7D: CPT | Performed by: INTERNAL MEDICINE

## 2020-05-26 NOTE — PROGRESS NOTES
"Transitional Care Follow Up Visit  Subjective     Amparo Valdes is a 89 y.o. female who presents for a transitional care management visit.    Within 48 business hours after discharge our office contacted her via telephone to coordinate her care and needs.      I reviewed and discussed the details of that call along with the discharge summary, hospital problems, inpatient lab results, inpatient diagnostic studies, and consultation reports with Amparo.     Current outpatient and discharge medications have been reconciled for the patient.  Reviewed by: Nj Mckeon MD      Date of TCM Phone Call 5/21/2020   [facility]   Date of Discharge 5/20/2020     Risk for Readmission (LACE) No data recorded    History of Present Illness patient is a 89-year-old female with a history of coronary disease mixed hyperlipidemia essential hypertension and respiratory insufficiency developed shortness of breath and congestion on May 18 and presented to [facility]  Course During Hospital Stay:    89-year-old female presented to [facility] on May 18 acutely short of breath chest x-ray showed no pneumonia her P O2 saturation was reduced she does not appear to be in heart failure she was treated and released on May 20 she has completed her Medrol Dosepak and doxycycline she continues her oxygen but only in the evening the 2 L a minute she states she is mildly short of breath denies chest pain or pressure denies edema she is accompanied by her daughter and currently not on oxygen during the office visit.         Vitals:    05/26/20 1516   BP: 130/74   BP Location: Left arm   Patient Position: Sitting   Cuff Size: Adult   Pulse: 56   Temp: 97.8 °F (36.6 °C)   TempSrc: Temporal   Weight: 67.4 kg (148 lb 9.6 oz)   Height: 167.6 cm (65.98\")   PainSc: 0-No pain     Body mass index is 24 kg/m².  Patient's Body mass index is 24 kg/m². BMI is within normal parameters. No follow-up required..     Advance Care Planning   ACP " discussion was held with the patient during this visit. Patient has an advance directive in EMR which is still valid.      Review of Systems   Constitutional: Positive for fatigue. Negative for activity change, appetite change, chills, diaphoresis, fever and unexpected weight change.   HENT: Negative.  Negative for ear pain, hearing loss, postnasal drip, sinus pain and trouble swallowing.    Eyes: Negative.  Negative for pain, discharge and visual disturbance.   Respiratory: Positive for cough, shortness of breath and wheezing.    Cardiovascular: Negative.  Negative for chest pain, palpitations and leg swelling.   Gastrointestinal: Negative for abdominal pain, blood in stool, diarrhea and vomiting.   Endocrine: Negative.  Negative for cold intolerance and heat intolerance.   Genitourinary: Negative.  Negative for dysuria and frequency.   Musculoskeletal: Negative for arthralgias, back pain, gait problem and neck pain.   Skin: Negative.    Neurological: Positive for weakness. Negative for dizziness, seizures, syncope, light-headedness and headaches.   Hematological: Negative.    Psychiatric/Behavioral: Negative.        Objective   Physical Exam   Constitutional: She is oriented to person, place, and time. She appears well-developed and well-nourished.   HENT:   Head: Normocephalic.   Nose: Nose normal.   Mouth/Throat: Oropharynx is clear and moist.   Eyes: Pupils are equal, round, and reactive to light. Conjunctivae and EOM are normal.   Neck: Normal range of motion. Neck supple. No thyromegaly present.   Cardiovascular: Normal rate, regular rhythm, normal heart sounds and intact distal pulses.   Pulmonary/Chest: Effort normal and breath sounds normal. She has no wheezes. She has no rales.   Abdominal: Soft. Bowel sounds are normal. She exhibits no mass. There is no tenderness. No hernia.   Musculoskeletal: Normal range of motion.   Lymphadenopathy:     She has no cervical adenopathy.   Neurological: She is alert and  oriented to person, place, and time.   Skin: Skin is warm and dry. Capillary refill takes less than 2 seconds.   Psychiatric: She has a normal mood and affect. Her behavior is normal. Judgment and thought content normal.       Assessment/Plan   Problem List Items Addressed This Visit        Cardiovascular and Mediastinum    Benign essential hypertension    Overview     History of essential hypertension with current blood pressure 120/70 on carvedilol 6.25 twice daily Lasix 20 mg daily blood pressure is in the low normal range the patient is asymptomatic denying headache dizzy or lightheadedness and no falls we will continue current therapy.         Current Assessment & Plan     2 hypertension with current blood pressure 130/74 on carvedilol 6.25 twice daily Lasix 20 mg daily continue current therapy         Relevant Medications    furosemide (LASIX) 20 MG tablet    carvedilol (COREG) 6.25 MG tablet    Mixed hyperlipidemia    Overview     History of mixed hyperlipidemia on atorvastatin 40 mg daily denies myalgia arthralgia         Current Assessment & Plan     Hyperlipidemia on atorvastatin 40 mg daily continue therapy         Relevant Medications    atorvastatin (LIPITOR) 40 MG tablet    Paroxysmal atrial fibrillation (CMS/[facility]) - Primary    Overview     Atrial fibrillation noted on EKG symptomatic on May 20, 2019 refer to cardiology underwent cardioversion is on Eliquis 5 mg twice daily Coreg 6.25 twice daily with current heart rate of 60/min         Current Assessment & Plan     History of atrial fibrillation on Eliquis 5 mg twice daily carvedilol 6.25 twice daily currently stable         Relevant Medications    carvedilol (COREG) 6.25 MG tablet       Respiratory    Moderate COPD (chronic obstructive pulmonary disease) (CMS/HCC)    Overview     History of mild COPD on O2 at night         Current Assessment & Plan     Of COPD and chronic hypoxia on oxygen at night recent exacerbation with acute respiratory failure  without evidence of congestive heart failure and no pneumonia was treated with doxycycline and O2 24/7 at [facility] she is completed the doxycycline and the Medrol Dosepak and suggest O2 24/7 2 L           Acute on chronic respiratory failure with hypoxia (CMS/HCC)    Overview     History of acute respiratory failure May 18 was admitted to [facility] discharged on May 20 on oxygen doxycycline and Medrol Dosepak plus her other medications she is improved and is finished her doxycycline and Medrol Dosepak but needs to continue her oxygen 24/7                    Patient Instructions   Encouraged to continue oxygen 24/7 instead of just in the evening  Continue all other medications  Ambulate with walking exercise  Continue healthy cardiac diet  Return visit on July 27  Please call office for any changes in symptoms      Nj Mckeon MD

## 2020-05-26 NOTE — ASSESSMENT & PLAN NOTE
Of COPD and chronic hypoxia on oxygen at night recent exacerbation with acute respiratory failure without evidence of congestive heart failure and no pneumonia was treated with doxycycline and O2 24/7 at [facility] she is completed the doxycycline and the Medrol Dosepak and suggest O2 24/7 2 L

## 2020-05-26 NOTE — ASSESSMENT & PLAN NOTE
2 hypertension with current blood pressure 130/74 on carvedilol 6.25 twice daily Lasix 20 mg daily continue current therapy

## 2020-05-26 NOTE — ASSESSMENT & PLAN NOTE
History of atrial fibrillation on Eliquis 5 mg twice daily carvedilol 6.25 twice daily currently stable

## 2020-05-27 NOTE — PATIENT INSTRUCTIONS
Encouraged to continue oxygen 24/7 instead of just in the evening  Continue all other medications  Ambulate with walking exercise  Continue healthy cardiac diet  Return visit on July 27  Please call office for any changes in symptoms

## 2020-06-15 DIAGNOSIS — G62.9 NEUROPATHY: ICD-10-CM

## 2020-06-15 RX ORDER — ATORVASTATIN CALCIUM 40 MG/1
40 TABLET, FILM COATED ORAL DAILY
Qty: 90 TABLET | Refills: 3 | Status: SHIPPED | OUTPATIENT
Start: 2020-06-15 | End: 2021-01-01

## 2020-06-15 RX ORDER — LIOTHYRONINE SODIUM 5 UG/1
5 TABLET ORAL DAILY
Qty: 90 TABLET | Refills: 3 | Status: SHIPPED | OUTPATIENT
Start: 2020-06-15 | End: 2021-02-12

## 2020-06-15 RX ORDER — GABAPENTIN 300 MG/1
300 CAPSULE ORAL 2 TIMES DAILY
Qty: 180 CAPSULE | Refills: 0 | Status: SHIPPED | OUTPATIENT
Start: 2020-06-15 | End: 2020-07-28 | Stop reason: SDUPTHER

## 2020-06-15 NOTE — TELEPHONE ENCOUNTER
Please review medication alert              Last Seen:   05/26/2020    Follow Up:  07/27/2020    Last approved:     04/03/2020       Qty:  180    Refills:  0    [place]:   Completed-  Printed for your review                           Req#  [id number]

## 2020-07-17 ENCOUNTER — TELEPHONE (OUTPATIENT)
Dept: INTERNAL MEDICINE | Facility: CLINIC | Age: 85
End: 2020-07-17

## 2020-07-17 DIAGNOSIS — E78.2 MIXED HYPERLIPIDEMIA: ICD-10-CM

## 2020-07-17 DIAGNOSIS — E03.9 ACQUIRED HYPOTHYROIDISM: ICD-10-CM

## 2020-07-17 DIAGNOSIS — N39.0 CHRONIC UTI: ICD-10-CM

## 2020-07-17 DIAGNOSIS — I10 BENIGN ESSENTIAL HYPERTENSION: Primary | ICD-10-CM

## 2020-07-17 NOTE — TELEPHONE ENCOUNTER
Caller: ELADIO RAZO    Relationship: Emergency Contact    Best call back number: [phone]    What orders are you requesting (i.e. lab or imaging): LABS IF NEEDED    In what timeframe would the patient need to come in: PT HAS APPT ON 7/28    Where will you receive your lab/imaging services: PT W[facility] [address] [facility] CLOS[facility] TO HER, IF LABS ARE NEEDED.    Additional notes: NONE

## 2020-07-17 NOTE — TELEPHONE ENCOUNTER
Yes fasting labs please have a CBC CMP TSH, T4, T3, and urine analysis microscopic culture if indicated.

## 2020-07-24 ENCOUNTER — LAB (OUTPATIENT)
Dept: LAB | Facility: HOSPITAL | Age: 85
End: 2020-07-24

## 2020-07-24 ENCOUNTER — TRANSCRIBE ORDERS (OUTPATIENT)
Dept: LAB | Facility: HOSPITAL | Age: 85
End: 2020-07-24

## 2020-07-24 DIAGNOSIS — E78.00 PURE HYPERCHOLESTEROLEMIA: Primary | ICD-10-CM

## 2020-07-24 DIAGNOSIS — I10 BENIGN ESSENTIAL HYPERTENSION: ICD-10-CM

## 2020-07-24 DIAGNOSIS — N39.0 CHRONIC UTI: ICD-10-CM

## 2020-07-24 DIAGNOSIS — E78.2 MIXED HYPERLIPIDEMIA: ICD-10-CM

## 2020-07-24 DIAGNOSIS — E78.00 PURE HYPERCHOLESTEROLEMIA: ICD-10-CM

## 2020-07-24 DIAGNOSIS — E03.9 ACQUIRED HYPOTHYROIDISM: ICD-10-CM

## 2020-07-24 LAB
ALBUMIN SERPL-MCNC: 3.8 G/DL (ref 3.5–5.2)
ALBUMIN SERPL-MCNC: 3.9 G/DL (ref 3.5–5.2)
ALBUMIN/GLOB SERPL: 1.4 G/DL
ALP SERPL-CCNC: 100 U/L (ref 39–117)
ALP SERPL-CCNC: 97 U/L (ref 39–117)
ALT SERPL W P-5'-P-CCNC: 30 U/L (ref 1–33)
ALT SERPL W P-5'-P-CCNC: 32 U/L (ref 1–33)
ANION GAP SERPL CALCULATED.3IONS-SCNC: 13.9 MMOL/L (ref 5–15)
AST SERPL-CCNC: 32 U/L (ref 1–32)
AST SERPL-CCNC: 33 U/L (ref 1–32)
BACTERIA UR QL AUTO: ABNORMAL /HPF
BASOPHILS # BLD AUTO: 0.08 10*3/MM3 (ref 0–0.2)
BASOPHILS NFR BLD AUTO: 1.4 % (ref 0–1.5)
BILIRUB CONJ SERPL-MCNC: 0.4 MG/DL (ref 0–0.3)
BILIRUB INDIRECT SERPL-MCNC: 0.5 MG/DL
BILIRUB SERPL-MCNC: 0.8 MG/DL (ref 0–1.2)
BILIRUB SERPL-MCNC: 0.9 MG/DL (ref 0–1.2)
BILIRUB UR QL STRIP: NEGATIVE
BUN SERPL-MCNC: 12 MG/DL (ref 8–23)
BUN/CREAT SERPL: 13 (ref 7–25)
CALCIUM SPEC-SCNC: 9.6 MG/DL (ref 8.2–9.6)
CHLORIDE SERPL-SCNC: 97 MMOL/L (ref 98–107)
CHOLEST SERPL-MCNC: 124 MG/DL (ref 0–200)
CLARITY UR: ABNORMAL
CO2 SERPL-SCNC: 25.1 MMOL/L (ref 22–29)
COLOR UR: YELLOW
CREAT SERPL-MCNC: 0.92 MG/DL (ref 0.57–1)
DEPRECATED RDW RBC AUTO: 47.9 FL (ref 37–54)
EOSINOPHIL # BLD AUTO: 0.11 10*3/MM3 (ref 0–0.4)
EOSINOPHIL NFR BLD AUTO: 1.9 % (ref 0.3–6.2)
ERYTHROCYTE [DISTWIDTH] IN BLOOD BY AUTOMATED COUNT: 13.3 % (ref 12.3–15.4)
GFR SERPL CREATININE-BSD FRML MDRD: 57 ML/MIN/1.73
GLOBULIN UR ELPH-MCNC: 2.7 GM/DL
GLUCOSE SERPL-MCNC: 119 MG/DL (ref 65–99)
GLUCOSE UR STRIP-MCNC: NEGATIVE MG/DL
HCT VFR BLD AUTO: 36.4 % (ref 34–46.6)
HDLC SERPL-MCNC: 50 MG/DL (ref 40–60)
HGB BLD-MCNC: 12.6 G/DL (ref 12–15.9)
HGB UR QL STRIP.AUTO: ABNORMAL
HYALINE CASTS UR QL AUTO: ABNORMAL /LPF
IMM GRANULOCYTES # BLD AUTO: 0.02 10*3/MM3 (ref 0–0.05)
IMM GRANULOCYTES NFR BLD AUTO: 0.3 % (ref 0–0.5)
KETONES UR QL STRIP: NEGATIVE
LDLC SERPL CALC-MCNC: 60 MG/DL (ref 0–100)
LDLC/HDLC SERPL: 1.21 {RATIO}
LEUKOCYTE ESTERASE UR QL STRIP.AUTO: ABNORMAL
LYMPHOCYTES # BLD AUTO: 1.16 10*3/MM3 (ref 0.7–3.1)
LYMPHOCYTES NFR BLD AUTO: 19.8 % (ref 19.6–45.3)
MCH RBC QN AUTO: 34.1 PG (ref 26.6–33)
MCHC RBC AUTO-ENTMCNC: 34.6 G/DL (ref 31.5–35.7)
MCV RBC AUTO: 98.6 FL (ref 79–97)
MONOCYTES # BLD AUTO: 0.63 10*3/MM3 (ref 0.1–0.9)
MONOCYTES NFR BLD AUTO: 10.8 % (ref 5–12)
NEUTROPHILS NFR BLD AUTO: 3.85 10*3/MM3 (ref 1.7–7)
NEUTROPHILS NFR BLD AUTO: 65.8 % (ref 42.7–76)
NITRITE UR QL STRIP: NEGATIVE
NRBC BLD AUTO-RTO: 0.2 /100 WBC (ref 0–0.2)
PH UR STRIP.AUTO: 7.5 [PH] (ref 5–8)
PLATELET # BLD AUTO: 286 10*3/MM3 (ref 140–450)
PMV BLD AUTO: 10.4 FL (ref 6–12)
POTASSIUM SERPL-SCNC: 4.3 MMOL/L (ref 3.5–5.2)
PROT SERPL-MCNC: 6.5 G/DL (ref 6–8.5)
PROT SERPL-MCNC: 6.6 G/DL (ref 6–8.5)
PROT UR QL STRIP: ABNORMAL
RBC # BLD AUTO: 3.69 10*6/MM3 (ref 3.77–5.28)
RBC # UR: ABNORMAL /HPF
REF LAB TEST METHOD: ABNORMAL
SODIUM SERPL-SCNC: 136 MMOL/L (ref 136–145)
SP GR UR STRIP: 1.01 (ref 1–1.03)
SQUAMOUS #/AREA URNS HPF: ABNORMAL /HPF
T3FREE SERPL-MCNC: 2.44 PG/ML (ref 2–4.4)
T4 FREE SERPL-MCNC: 1.48 NG/DL (ref 0.93–1.7)
TRIGL SERPL-MCNC: 68 MG/DL (ref 0–150)
TSH SERPL DL<=0.05 MIU/L-ACNC: 0.18 UIU/ML (ref 0.27–4.2)
UROBILINOGEN UR QL STRIP: ABNORMAL
VLDLC SERPL-MCNC: 13.6 MG/DL
WBC # BLD AUTO: 5.85 10*3/MM3 (ref 3.4–10.8)
WBC UR QL AUTO: ABNORMAL /HPF

## 2020-07-24 PROCEDURE — 81001 URINALYSIS AUTO W/SCOPE: CPT

## 2020-07-24 PROCEDURE — 84481 FREE ASSAY (FT-3): CPT

## 2020-07-24 PROCEDURE — 87086 URINE CULTURE/COLONY COUNT: CPT

## 2020-07-24 PROCEDURE — 84439 ASSAY OF FREE THYROXINE: CPT

## 2020-07-24 PROCEDURE — 80076 HEPATIC FUNCTION PANEL: CPT

## 2020-07-24 PROCEDURE — 82248 BILIRUBIN DIRECT: CPT

## 2020-07-24 PROCEDURE — 85025 COMPLETE CBC W/AUTO DIFF WBC: CPT

## 2020-07-24 PROCEDURE — 84443 ASSAY THYROID STIM HORMONE: CPT

## 2020-07-24 PROCEDURE — 36415 COLL VENOUS BLD VENIPUNCTURE: CPT

## 2020-07-24 PROCEDURE — 80053 COMPREHEN METABOLIC PANEL: CPT

## 2020-07-24 PROCEDURE — 80061 LIPID PANEL: CPT | Performed by: NURSE PRACTITIONER

## 2020-07-25 LAB — BACTERIA SPEC AEROBE CULT: NORMAL

## 2020-07-28 ENCOUNTER — OFFICE VISIT (OUTPATIENT)
Dept: INTERNAL MEDICINE | Facility: CLINIC | Age: 85
End: 2020-07-28

## 2020-07-28 VITALS
HEIGHT: 65 IN | HEART RATE: 84 BPM | TEMPERATURE: 97.8 F | SYSTOLIC BLOOD PRESSURE: 132 MMHG | DIASTOLIC BLOOD PRESSURE: 78 MMHG | WEIGHT: 146.4 LBS | BODY MASS INDEX: 24.39 KG/M2

## 2020-07-28 DIAGNOSIS — I48.0 PAROXYSMAL ATRIAL FIBRILLATION (HCC): ICD-10-CM

## 2020-07-28 DIAGNOSIS — I50.22 CHRONIC SYSTOLIC CONGESTIVE HEART FAILURE (HCC): ICD-10-CM

## 2020-07-28 DIAGNOSIS — G62.9 NEUROPATHY: ICD-10-CM

## 2020-07-28 DIAGNOSIS — N39.0 CHRONIC UTI: Primary | ICD-10-CM

## 2020-07-28 DIAGNOSIS — I10 BENIGN ESSENTIAL HYPERTENSION: ICD-10-CM

## 2020-07-28 DIAGNOSIS — E78.2 MIXED HYPERLIPIDEMIA: ICD-10-CM

## 2020-07-28 DIAGNOSIS — M54.50 LUMBAR PAIN: ICD-10-CM

## 2020-07-28 LAB
BILIRUB BLD-MCNC: NEGATIVE MG/DL
CLARITY, POC: ABNORMAL
COLOR UR: YELLOW
GLUCOSE UR STRIP-MCNC: NEGATIVE MG/DL
KETONES UR QL: NEGATIVE
LEUKOCYTE EST, POC: ABNORMAL
NITRITE UR-MCNC: NEGATIVE MG/ML
PH UR: 6.5 [PH] (ref 5–8)
PROT UR STRIP-MCNC: ABNORMAL MG/DL
RBC # UR STRIP: ABNORMAL /UL
SP GR UR: 1.02 (ref 1–1.03)
UROBILINOGEN UR QL: NORMAL

## 2020-07-28 PROCEDURE — 81003 URINALYSIS AUTO W/O SCOPE: CPT | Performed by: INTERNAL MEDICINE

## 2020-07-28 PROCEDURE — 99214 OFFICE O/P EST MOD 30 MIN: CPT | Performed by: INTERNAL MEDICINE

## 2020-07-28 RX ORDER — PANTOPRAZOLE SODIUM 40 MG/1
40 TABLET, DELAYED RELEASE ORAL DAILY
Qty: 90 TABLET | Refills: 3 | Status: SHIPPED | OUTPATIENT
Start: 2020-07-28 | End: 2021-01-01

## 2020-07-28 RX ORDER — CYCLOBENZAPRINE HCL 5 MG
TABLET ORAL
Qty: 20 TABLET | Refills: 0 | Status: SHIPPED | OUTPATIENT
Start: 2020-07-28 | End: 2020-11-04

## 2020-07-28 RX ORDER — GABAPENTIN 300 MG/1
300 CAPSULE ORAL 2 TIMES DAILY
Qty: 180 CAPSULE | Refills: 0 | Status: SHIPPED | OUTPATIENT
Start: 2020-07-28 | End: 2020-10-29 | Stop reason: SDUPTHER

## 2020-07-28 NOTE — PROGRESS NOTES
[facility]     Amparo Valdes  2/3/1930   [id number]      Patient Care Team:  Nj Mckeon MD as PCP - General  Nj Mckeon MD as PCP - Family Medicine  Harjeet Martinez MD as PCP - Claims Attributed  Harjeet Martinez MD as Consulting Physician (Interventional Cardiology)    Chief Complaint::   Chief Complaint   Patient presents with   • Back Pain     lumbar.  Her back has been hurting for 2 days.  Denies radiation of pain.  No pain with sitting but with sitting and standing    HPI;  Patient 89-year-old female complaining of acute low back pain nonradiating and no injury no falls for the past 2 days patient is reasonably comfortable when sitting or lying supine she is uncomfortable standing rising from a chair or ambulating she has a history of congestive heart failure improved history of hypertension mixed hyperlipidemia hypothyroidism reasonably well with the exception of the low back pain.            Patient Active Problem List   Diagnosis   • Irritable bowel syndrome with constipation and diarrhea   • Chronic cystitis   • Benign essential hypertension   • ASHD (arteriosclerotic heart disease)   • Moderate COPD (chronic obstructive pulmonary disease) (CMS/HCC)   • Acquired hypothyroidism   • Other specified anemias   • Primary osteoarthritis involving multiple joints   • Advanced age   • Ischemic heart disease   • Gastroesophageal reflux disease without esophagitis   • Arteriosclerosis of coronary artery bypass graft   • Asthenia   • Chronic UTI   • CHF (congestive heart failure) (CMS/HCC)   • Femoral hernia   • First degree atrioventricular block   • Heart disorder   • Herpes zoster   • Hyponatremia   • Iron deficiency anemia   • Low blood pressure   • Mixed hyperlipidemia   • Osteoarthritis   • Parasomnia   • Urethritis   • Paroxysmal atrial fibrillation (CMS/HCC)   • Skin infection   • Acute on chronic respiratory failure with hypoxia (CMS/HCC)   • Lumbar pain        Past Medical History:    Diagnosis Date   • Arthritis    • Cancer, skin, squamous cell 12/27/2012   • Colon polyps 1987    clear 1994   • COPD (chronic obstructive pulmonary disease) (CMS/[facility])    • Family history of coronary artery disease    • Family history of hyperlipidemia    • Family history of hypertension    • Family history of stroke    • Femoral hernia of right side 12/13/2011   • Hyperlipidemia    • Hypertension    • Hypothyroidism    • Osteoarthritis    • Osteoporosis    • Pneumonia    • Rectal fistula     repair   • SBO (small bowel obstruction) (CMS/[facility]) 09/13/2012    Had right inguinal hernia repair   • Sepsis (CMS/[facility]) 04/2014    - Drug therapy    • Stress incontinence     A and P  repair   • Thyroid disease        Past Surgical History:   Procedure Laterality Date   • ANAL FISTULA REPAIR     • CARDIAC SURGERY  10/2011    stents placed   • CATARACT EXTRACTION     • CORONARY ANGIOPLASTY WITH STENT PLACEMENT  11/2011    x3   • HYSTERECTOMY     • INGUINAL HERNIA REPAIR     • OTHER SURGICAL HISTORY      thyroid uptake   • VAGINAL VAULT SUSPENSION         Family History   Problem Relation Age of Onset   • Stroke Mother    • Heart disease Father    • Hypertension Brother    • Diabetes Brother    • Diabetes Daughter    • Heart disease Daughter    • Hypertension Son    • Hypertension Other    • Coronary artery disease Other    • Stroke Other    • Hyperlipidemia Other    • Other Other         ichthyosis       Social History     Socioeconomic History   • Marital status:      Spouse name: Not on file   • Number of children: Not on file   • Years of education: Not on file   • Highest education level: Not on file   Tobacco Use   • Smoking status: Former Smoker   • Smokeless tobacco: Former User   • Tobacco comment: quit at approx age 60   Substance and Sexual Activity   • Alcohol use: Yes     Comment: rarely   • Drug use: No   • Sexual activity: Defer   Social History Narrative    Caffeine: 2 cups daily       Allergies  "  Allergen Reactions   • Acetaminophen Unknown (See Comments)     Darvocet- N100   • Baycol [Cerivastatin] Unknown (See Comments)     unknown   • Ciprofloxacin Unknown (See Comments)     Unknown   • Ciprofloxacin Hcl Unknown (See Comments)     unknown   • Codeine Unknown (See Comments)     unknown   • Fosamax [Alendronate Sodium] Unknown (See Comments)     unknown   • Meperidine Unknown (See Comments)     unknown   • Methadone Unknown (See Comments)     unknown   • Morphine Unknown (See Comments)     unknown   • Naloxone Unknown (See Comments)     unknown   • Propoxyphene Unknown (See Comments)     darvocet-n 100   • Sulfa Antibiotics Unknown (See Comments)     unknown   • Ubidecarenone GI Intolerance   • Zocor [Simvastatin] Unknown (See Comments)     unknown       Review of Systems     HEENT: Denies headache or dizzy significant vision or hearing change  NECK: Denies pain stiffness or dysphasia  CHEST: Denies cough or wheeze chronically short of breath on O2  CARDIAC: Denies chest pain or pressure history of coronary disease congestive heart failure and essential hypertension and atrophia currently stable  ABD: Denies nausea vomiting  : Complains of dysuria with a history of frequent urinary tract infections  NEURO: Denies syncope concussion  PSYCH: Denies anxiety depression  EXTREM: Complains of low back pain and some peripheral arthritic changes denies edema    Vital Signs  Vitals:    07/28/20 1537 07/28/20 1545   BP: 150/82 132/78   BP Location: Right arm    Patient Position: Sitting    Cuff Size: Adult    Pulse: 84    Temp: 97.8 °F (36.6 °C)    Weight: 66.4 kg (146 lb 6.4 oz)    Height: 165.1 cm (65\")    PainSc:   6    PainLoc: Back      Body mass index is 24.36 kg/m².  Patient's Body mass index is 24.36 kg/m². BMI is within normal parameters. No follow-up required..     Advance Care Planning         Current Outpatient Medications:   •  apixaban (ELIQUIS) 5 MG tablet tablet, Take 1 tablet by mouth 2 (Two) " Times a Day., Disp: 180 tablet, Rfl: 3  •  atorvastatin (LIPITOR) 40 MG tablet, Take 1 tablet by mouth Daily., Disp: 90 tablet, Rfl: 3  •  calcium carbonate-vitamin d (CALTRATE 600+D) 600-400 MG-UNIT per tablet, Take 1 tablet by mouth Daily., Disp: , Rfl:   •  Carboxymethylcellulose Sodium (THERATEARS) 0.25 % solution, Apply 1 drop to eye(s) as directed by provider. 1-2 times daily, Disp: , Rfl:   •  carvedilol (COREG) 6.25 MG tablet, Take 1 tablet by mouth 2 (Two) Times a Day With Meals., Disp: 180 tablet, Rfl: 3  •  docusate sodium (COLACE) 100 MG capsule, 100 mg Daily., Disp: , Rfl:   •  furosemide (LASIX) 20 MG tablet, Take 1 tablet by mouth Daily., Disp: 90 tablet, Rfl: 3  •  gabapentin (NEURONTIN) 300 MG capsule, Take 1 capsule by mouth 2 (Two) Times a Day., Disp: 180 capsule, Rfl: 0  •  levothyroxine (SYNTHROID, LEVOTHROID) 50 MCG tablet, Note chg dose to 50 mcg from 75, Disp: 90 tablet, Rfl: 3  •  liothyronine (CYTOMEL) 5 MCG tablet, Take 1 tablet by mouth Daily., Disp: 90 tablet, Rfl: 3  •  Multiple Vitamins-Minerals (CENTRUM SILVER PO), Take 1 tablet by mouth Daily., Disp: , Rfl:   •  O2 (OXYGEN), Inhale 2 L/min Every Night. Takes 3 l every night, Disp: , Rfl:   •  pantoprazole (PROTONIX) 40 MG EC tablet, Take 1 tablet by mouth Daily., Disp: 90 tablet, Rfl: 3  •  potassium chloride (MICRO-K) 10 MEQ CR capsule, Take 1/2 tablet daily, Disp: 45 capsule, Rfl: 3  •  trimethoprim (TRIMPEX) 100 MG tablet, Take 1 tablet by mouth Daily., Disp: 90 tablet, Rfl: 3  •  cyclobenzaprine (FLEXERIL) 5 MG tablet, 1/2 tab twice a day for pain, Disp: 20 tablet, Rfl: 0    Physical Exam     ACE III MINI         HEENT: No asymmetry pharynx is clear  NECK: No masses bruit thyromegaly or neck vein distention  CHEST: Clear without rales or wheezing  CARDIAC: Regular rhythm without gallop or rub systolic murmur present  ABD: Liver spleen normal good bowel sounds  : Deferred  NEURO: Intact  PSYCH: Normal  EXTREM: Arthritic change  reduced range of motion of lumbar spine with pain on rotation and lateral movement  Skin: Clear     Results Review:    Recent Results (from the past 672 hour(s))   Lipid Panel    Collection Time: 07/24/20  9:25 AM   Result Value Ref Range    Total Cholesterol 124 0 - 200 mg/dL    Triglycerides 68 0 - 150 mg/dL    HDL Cholesterol 50 40 - 60 mg/dL    LDL Cholesterol  60 0 - 100 mg/dL    VLDL Cholesterol 13.6 mg/dL    LDL/HDL Ratio 1.21    Hepatic Function Panel    Collection Time: 07/24/20  9:25 AM   Result Value Ref Range    Total Protein 6.6 6.0 - 8.5 g/dL    Albumin 3.90 3.50 - 5.20 g/dL    ALT (SGPT) 30 1 - 33 U/L    AST (SGOT) 33 (H) 1 - 32 U/L    Alkaline Phosphatase 100 39 - 117 U/L    Total Bilirubin 0.9 0.0 - 1.2 mg/dL    Bilirubin, Direct 0.4 (H) 0.0 - 0.3 mg/dL    Bilirubin, Indirect 0.5 mg/dL   Comprehensive Metabolic Panel    Collection Time: 07/24/20  9:37 AM   Result Value Ref Range    Glucose 119 (H) 65 - 99 mg/dL    BUN 12 8 - 23 mg/dL    Creatinine 0.92 0.57 - 1.00 mg/dL    Sodium 136 136 - 145 mmol/L    Potassium 4.3 3.5 - 5.2 mmol/L    Chloride 97 (L) 98 - 107 mmol/L    CO2 25.1 22.0 - 29.0 mmol/L    Calcium 9.6 8.2 - 9.6 mg/dL    Total Protein 6.5 6.0 - 8.5 g/dL    Albumin 3.80 3.50 - 5.20 g/dL    ALT (SGPT) 32 1 - 33 U/L    AST (SGOT) 32 1 - 32 U/L    Alkaline Phosphatase 97 39 - 117 U/L    Total Bilirubin 0.8 0.0 - 1.2 mg/dL    eGFR Non African Amer 57 (L) >60 mL/min/1.73    Globulin 2.7 gm/dL    A/G Ratio 1.4 g/dL    BUN/Creatinine Ratio 13.0 7.0 - 25.0    Anion Gap 13.9 5.0 - 15.0 mmol/L   TSH    Collection Time: 07/24/20  9:37 AM   Result Value Ref Range    TSH 0.185 (L) 0.270 - 4.200 uIU/mL   T4, Free    Collection Time: 07/24/20  9:37 AM   Result Value Ref Range    Free T4 1.48 0.93 - 1.70 ng/dL   T3, Free    Collection Time: 07/24/20  9:37 AM   Result Value Ref Range    T3, Free 2.44 2.00 - 4.40 pg/mL   Urinalysis With Culture If Indicated - Urine, Clean Catch    Collection Time: 07/24/20   9:37 AM   Result Value Ref Range    Color, UA Yellow Yellow, Straw    Appearance, UA Cloudy (A) Clear    pH, UA 7.5 5.0 - 8.0    Specific Gravity, UA 1.011 1.005 - 1.030    Glucose, UA Negative Negative    Ketones, UA Negative Negative    Bilirubin, UA Negative Negative    Blood, UA Trace (A) Negative    Protein, UA 30 mg/dL (1+) (A) Negative    Leuk Esterase, UA Large (3+) (A) Negative    Nitrite, UA Negative Negative    Urobilinogen, UA 1.0 E.U./dL 0.2 - 1.0 E.U./dL   CBC Auto Differential    Collection Time: 07/24/20  9:37 AM   Result Value Ref Range    WBC 5.85 3.40 - 10.80 10*3/mm3    RBC 3.69 (L) 3.77 - 5.28 10*6/mm3    Hemoglobin 12.6 12.0 - 15.9 g/dL    Hematocrit 36.4 34.0 - 46.6 %    MCV 98.6 (H) 79.0 - 97.0 fL    MCH 34.1 (H) 26.6 - 33.0 pg    MCHC 34.6 31.5 - 35.7 g/dL    RDW 13.3 12.3 - 15.4 %    RDW-SD 47.9 37.0 - 54.0 fl    MPV 10.4 6.0 - 12.0 fL    Platelets 286 140 - 450 10*3/mm3    Neutrophil % 65.8 42.7 - 76.0 %    Lymphocyte % 19.8 19.6 - 45.3 %    Monocyte % 10.8 5.0 - 12.0 %    Eosinophil % 1.9 0.3 - 6.2 %    Basophil % 1.4 0.0 - 1.5 %    Immature Grans % 0.3 0.0 - 0.5 %    Neutrophils, Absolute 3.85 1.70 - 7.00 10*3/mm3    Lymphocytes, Absolute 1.16 0.70 - 3.10 10*3/mm3    Monocytes, Absolute 0.63 0.10 - 0.90 10*3/mm3    Eosinophils, Absolute 0.11 0.00 - 0.40 10*3/mm3    Basophils, Absolute 0.08 0.00 - 0.20 10*3/mm3    Immature Grans, Absolute 0.02 0.00 - 0.05 10*3/mm3    nRBC 0.2 0.0 - 0.2 /100 WBC   Urinalysis, Microscopic Only - Urine, Clean Catch    Collection Time: 07/24/20  9:37 AM   Result Value Ref Range    RBC, UA 0-2 None Seen, 0-2 /HPF    WBC, UA 31-50 (A) None Seen, 0-2 /HPF    Bacteria, UA None Seen None Seen /HPF    Squamous Epithelial Cells, UA 3-6 (A) None Seen, 0-2 /HPF    Hyaline Casts, UA 3-6 None Seen /LPF    Methodology Automated Microscopy    Urine Culture - Urine, Urine, Clean Catch    Collection Time: 07/24/20  9:37 AM   Result Value Ref Range    Urine Culture <25,000  CFU/mL Mixed Juanita Isolated    POC Urinalysis Dipstick, Automated    Collection Time: 07/28/20  4:14 PM   Result Value Ref Range    Color Yellow Yellow, Straw, Dark Yellow, Cailin    Clarity, UA Cloudy (A) Clear    Specific Gravity  1.020 1.005 - 1.030    pH, Urine 6.5 5.0 - 8.0    Leukocytes Large (3+) (A) Negative    Nitrite, UA Negative Negative    Protein,  mg/dL (A) Negative mg/dL    Glucose, UA Negative Negative, 1000 mg/dL (3+) mg/dL    Ketones, UA Negative Negative    Urobilinogen, UA Normal Normal    Bilirubin Negative Negative    Blood, UA Moderate (A) Negative     Procedures    Medication Review: Medications reviewed and noted    Patient wellness counseling  Exercise: Encouraged rest  Diet: Encouraged healthy cardiac diet  Smoking: Non-smoker  Alcohol: None alcohol  Screening: POC urine is abnormal culture pending    Assessment/Plan:    Problem List Items Addressed This Visit        Cardiovascular and Mediastinum    Benign essential hypertension    Overview     History of essential hypertension with current blood pressure 120/70 on carvedilol 6.25 twice daily Lasix 20 mg daily blood pressure is in the low normal range the patient is asymptomatic denying headache dizzy or lightheadedness and no falls we will continue current therapy.         Current Assessment & Plan       Current blood pressure 132/78, continue carvedilol 6.25 twice daily Lasix 20 mg daily suggest no change in therapy         Relevant Medications    furosemide (LASIX) 20 MG tablet    carvedilol (COREG) 6.25 MG tablet    CHF (congestive heart failure) (CMS/[facility])    Overview     History of chronic heart failure improved on carvedilol Eliquis Lipitor Lasix and O2         Current Assessment & Plan       Heart failure improved with clear lungs no edema continue Eliquis atorvastatin carvedilol and Lasix         Relevant Medications    carvedilol (COREG) 6.25 MG tablet    Mixed hyperlipidemia    Overview     History of mixed hyperlipidemia  on atorvastatin 40 mg daily denies myalgia arthralgia         Current Assessment & Plan       Mixed hyperlipidemia improved and stable on atorvastatin 40 mg daily denies myalgia arthralgia continue therapy         Relevant Medications    atorvastatin (LIPITOR) 40 MG tablet    Paroxysmal atrial fibrillation (CMS/HCC)    Overview     Atrial fibrillation noted on EKG symptomatic on May 20, 2019 refer to cardiology underwent cardioversion is on Eliquis 5 mg twice daily Coreg 6.25 twice daily with current heart rate of 60/min         Current Assessment & Plan     History of atrial fibrillation currently on Eliquis 5 mg twice daily and Lasix 20 mg daily carvedilol 6.25 twice daily continue therapy         Relevant Medications    carvedilol (COREG) 6.25 MG tablet       Nervous and Auditory    Lumbar pain    Overview     Patient complains of pain primarily located in the lumbar area denies fall or injury denies radiation Tylenol as needed has been beneficial she is having some difficulty ambulating and moving to chair and from the chair and standing walking         Current Assessment & Plan     Acute episode of lumbar pain and strain the patient denies recent falls or injuries or significant lifting she is more comfortable supine or sitting with some difficulty leaning over the exam reveals stable DTRs negative leg raising reduced range of motion of the lumbar spine on lateral movement and twisting suggest Tylenol 2 tablets twice daily and Flexeril 5 mg - 1/2 tablet twice daily PRN            Genitourinary    Chronic UTI - Primary    Overview     Recent urinary tract infection with E. coli treated with Macrodantin for 8 days will resume the trimethoprim 100 mg daily for suppression         Current Assessment & Plan     Recurrent symptoms of UTI POC urinalysis is abnormal will send for culture and sensitivity no therapy change pending the culture results         Relevant Orders    POC Urinalysis Dipstick, Automated  (Completed)      Other Visit Diagnoses     Neuropathy        Relevant Medications    gabapentin (NEURONTIN) 300 MG capsule           Patient Instructions   POC urine is abnormal culture is pending no therapy change at this time  Encouraged rest  Encouraged Tylenol 2 tablets twice daily and Flexeril 5 mg 1/2 tablet twice daily  All other medications notify us if back is not improved  Return visit in 6 months or as needed       Plan of care reviewed with patient at the conclusion of today's visit. Education was provided regarding diagnosis, management, and any prescribed or recommended OTC medications.Patient verbalizes understanding of and agreement with management plan.         Nj Mckeon MD      Note: Part of this note may be an electronic transcription/translation of spoken language to printed text using the Dragon Dictation system.      Answers for HPI/ROS submitted by the patient on 7/21/2020   What is the primary reason for your visit?: Other  Please describe your symptoms.: Scheduled perioic follow up for COPD , plus  may have a mild UTI  Have you had these symptoms before?: Yes  How long have you been having these symptoms?: 1-4 days  Please list any medications you are currently taking for this condition.: Trimethoprim as a prophylactic because of a stitch in bladder that causes chronic UT  Please describe any probable cause for these symptoms. : Shortness of breath caused by weakened heart function. Using oxygen at night and some during the day. Not the 24/7 prescribed.

## 2020-07-29 ENCOUNTER — LAB (OUTPATIENT)
Dept: LAB | Facility: HOSPITAL | Age: 85
End: 2020-07-29

## 2020-07-29 DIAGNOSIS — N39.0 CHRONIC UTI: ICD-10-CM

## 2020-07-29 DIAGNOSIS — N39.0 CHRONIC UTI: Primary | ICD-10-CM

## 2020-07-29 PROCEDURE — 87086 URINE CULTURE/COLONY COUNT: CPT

## 2020-07-29 PROCEDURE — 87077 CULTURE AEROBIC IDENTIFY: CPT

## 2020-07-29 PROCEDURE — 87186 SC STD MICRODIL/AGAR DIL: CPT

## 2020-07-29 NOTE — ASSESSMENT & PLAN NOTE
Heart failure improved with clear lungs no edema continue Eliquis atorvastatin carvedilol and Lasix

## 2020-07-29 NOTE — ASSESSMENT & PLAN NOTE
Acute episode of lumbar pain and strain the patient denies recent falls or injuries or significant lifting she is more comfortable supine or sitting with some difficulty leaning over the exam reveals stable DTRs negative leg raising reduced range of motion of the lumbar spine on lateral movement and twisting suggest Tylenol 2 tablets twice daily and Flexeril 5 mg - 1/2 tablet twice daily PRN

## 2020-07-29 NOTE — ASSESSMENT & PLAN NOTE
OUTPATIENT PROGRESS NOTE  TRANSITIONAL CARE MANAGEMENT - HOSPITAL DISCHARGE FOLLOW-UP      CHIEF COMPLAINT  Transitional Care Management (8/27/18-9/4/18) and Transitional Care Management (Hospital Discharge Follow-Up)      Mr. Lazo is unaccompanied today.    SUBJECTIVE   The patient was discharged from the hospital on 9/4/2018. The Discharge Summary was reviewed. It documents that the patient was hospitalized for RUL mass positive for squamous cell CA s/p lobectomy  Emphysema  Brachiocephalic artery stenosis, occlsuive subclavian artery stenosis  GERD  HLD  HTN    During the summer, the patient lives in [facility].  He is not having pneumonia and was diagnosed with the mask and subsequently sent down to [facility] where he had a right thoracotomy for squamous cell lung cancer.  He was discharged from the hospital and states he is feeling fairly well.  He is set up to see an oncologist down at [facility].    Pertinent un-finalized hospital performed diagnostic tests - were reviewed..    Pertinent un-finalized hospital lab tests - were reviewed.    Advanced Directives:  · Patient doesn't have an Advance Directives document - document was given today    Durable Medical Equipment/Assistive devices prescribed: None     MEDICATIONS  The discharge medication list was reviewed. Outpatient medications were updated today. He is fully compliant with the medication regimen prescribed at the time of discharge.    HISTORIES  I have personally reviewed and updated the following electronic medical record sections: Allergies, Problem List, Past Medical History, Past Surgical History, Social History and Family History.    REVIEW OF SYSTEMS  GENERAL: denies fever, chills, night sweats, fatigue, weight loss and weight gain  CARDIORESPIRATORY: denies chest pain, palpitations, fast heart rate, edema, cough, hemoptysis, wheeze, sputum, paroxysmal nocturnal dyspnea, orthopnea, cyanosis and claudication    PHYSICAL  History of atrial fibrillation currently on Eliquis 5 mg twice daily and Lasix 20 mg daily carvedilol 6.25 twice daily continue therapy   EXAM  Visit Vitals  /60   Pulse 70   Wt 66 kg   SpO2 96%   BMI 24.96 kg/m²     Lungs: Examination the lung shows fairly good aeration with some decreased aeration on the right lung no wheezing noted.  Heart: regular rate and rhythm, S1, S2 normal, no murmur, click, rub or gallop      ASSESSMENT  1. Malignant neoplasm of right lung, unspecified part of lung (CMS/HCC)    2. Need for vaccination    3. Chronic obstructive pulmonary disease, unspecified COPD type (CMS/HCC)        PLAN  Patient will continue on his current medications and follow-up with me in one month for reevaluation.    Patient adherence to his treatment plan was assessed. He is   fully compliant with the entire discharge treatment plan. Patient was seen for an expanded problem focused history, expanded problem exam, low complexity decision making.

## 2020-07-29 NOTE — PATIENT INSTRUCTIONS
POC urine is abnormal culture is pending no therapy change at this time  Encouraged rest  Encouraged Tylenol 2 tablets twice daily and Flexeril 5 mg 1/2 tablet twice daily  All other medications notify us if back is not improved  Return visit in 6 months or as needed

## 2020-07-29 NOTE — ASSESSMENT & PLAN NOTE
Current blood pressure 132/78, continue carvedilol 6.25 twice daily Lasix 20 mg daily suggest no change in therapy

## 2020-07-29 NOTE — ASSESSMENT & PLAN NOTE
Mixed hyperlipidemia improved and stable on atorvastatin 40 mg daily denies myalgia arthralgia continue therapy

## 2020-07-29 NOTE — ASSESSMENT & PLAN NOTE
Recurrent symptoms of UTI POC urinalysis is abnormal will send for culture and sensitivity no therapy change pending the culture results

## 2020-07-31 ENCOUNTER — PRIOR AUTHORIZATION (OUTPATIENT)
Dept: INTERNAL MEDICINE | Facility: CLINIC | Age: 85
End: 2020-07-31

## 2020-07-31 LAB — BACTERIA SPEC AEROBE CULT: ABNORMAL

## 2020-07-31 RX ORDER — AMPICILLIN 500 MG/1
500 CAPSULE ORAL 3 TIMES DAILY
Qty: 24 CAPSULE | Refills: 0 | Status: SHIPPED | OUTPATIENT
Start: 2020-07-31 | End: 2020-11-04

## 2020-07-31 NOTE — TELEPHONE ENCOUNTER
Faxed received state cyclobenzaprine was denied by [facility].  GoodRx price is very inexpensive.  I notified pharmacy via voice mail message and asked them to fill patient's RX using GoodRx or cash price.

## 2020-10-29 ENCOUNTER — TELEPHONE (OUTPATIENT)
Dept: INTERNAL MEDICINE | Facility: CLINIC | Age: 85
End: 2020-10-29

## 2020-10-29 DIAGNOSIS — G62.9 NEUROPATHY: ICD-10-CM

## 2020-10-29 RX ORDER — GABAPENTIN 300 MG/1
300 CAPSULE ORAL 2 TIMES DAILY
Qty: 180 CAPSULE | Refills: 0 | Status: SHIPPED | OUTPATIENT
Start: 2020-10-29 | End: 2021-01-27

## 2020-11-04 ENCOUNTER — OFFICE VISIT (OUTPATIENT)
Dept: INTERNAL MEDICINE | Facility: CLINIC | Age: 85
End: 2020-11-04

## 2020-11-04 ENCOUNTER — LAB (OUTPATIENT)
Dept: LAB | Facility: HOSPITAL | Age: 85
End: 2020-11-04

## 2020-11-04 VITALS
TEMPERATURE: 98 F | WEIGHT: 157 LBS | HEART RATE: 64 BPM | BODY MASS INDEX: 26.16 KG/M2 | SYSTOLIC BLOOD PRESSURE: 158 MMHG | HEIGHT: 65 IN | DIASTOLIC BLOOD PRESSURE: 84 MMHG

## 2020-11-04 DIAGNOSIS — I10 BENIGN ESSENTIAL HYPERTENSION: ICD-10-CM

## 2020-11-04 DIAGNOSIS — I50.9 ACUTE ON CHRONIC CONGESTIVE HEART FAILURE, UNSPECIFIED HEART FAILURE TYPE (HCC): Primary | ICD-10-CM

## 2020-11-04 DIAGNOSIS — R60.9 PERIPHERAL EDEMA: ICD-10-CM

## 2020-11-04 DIAGNOSIS — R06.02 SOB (SHORTNESS OF BREATH): ICD-10-CM

## 2020-11-04 LAB
ALBUMIN SERPL-MCNC: 4 G/DL (ref 3.5–5.2)
ALBUMIN/GLOB SERPL: 1.5 G/DL
ALP SERPL-CCNC: 115 U/L (ref 39–117)
ALT SERPL W P-5'-P-CCNC: 29 U/L (ref 1–33)
ANION GAP SERPL CALCULATED.3IONS-SCNC: 9 MMOL/L (ref 5–15)
AST SERPL-CCNC: 33 U/L (ref 1–32)
BILIRUB SERPL-MCNC: 0.9 MG/DL (ref 0–1.2)
BUN SERPL-MCNC: 15 MG/DL (ref 8–23)
BUN/CREAT SERPL: 16 (ref 7–25)
CALCIUM SPEC-SCNC: 9.2 MG/DL (ref 8.2–9.6)
CHLORIDE SERPL-SCNC: 93 MMOL/L (ref 98–107)
CO2 SERPL-SCNC: 26 MMOL/L (ref 22–29)
CREAT SERPL-MCNC: 0.94 MG/DL (ref 0.57–1)
GFR SERPL CREATININE-BSD FRML MDRD: 56 ML/MIN/1.73
GLOBULIN UR ELPH-MCNC: 2.6 GM/DL
GLUCOSE SERPL-MCNC: 157 MG/DL (ref 65–99)
NT-PROBNP SERPL-MCNC: 6979 PG/ML (ref 0–1800)
POTASSIUM SERPL-SCNC: 4.8 MMOL/L (ref 3.5–5.2)
PROT SERPL-MCNC: 6.6 G/DL (ref 6–8.5)
SODIUM SERPL-SCNC: 128 MMOL/L (ref 136–145)

## 2020-11-04 PROCEDURE — 83880 ASSAY OF NATRIURETIC PEPTIDE: CPT

## 2020-11-04 PROCEDURE — 99214 OFFICE O/P EST MOD 30 MIN: CPT | Performed by: NURSE PRACTITIONER

## 2020-11-04 PROCEDURE — 80053 COMPREHEN METABOLIC PANEL: CPT

## 2020-11-04 RX ORDER — PROPYLENE GLYCOL 0.06 MG/ML
1 SOLUTION/ DROPS OPHTHALMIC DAILY PRN
COMMUNITY

## 2020-11-04 RX ORDER — FUROSEMIDE 20 MG/1
TABLET ORAL
Qty: 90 TABLET | Refills: 3
Start: 2020-11-04 | End: 2020-11-18 | Stop reason: SDUPTHER

## 2020-11-04 RX ORDER — POTASSIUM CHLORIDE 750 MG/1
CAPSULE, EXTENDED RELEASE ORAL
Qty: 45 CAPSULE | Refills: 3
Start: 2020-11-04 | End: 2020-11-18 | Stop reason: SDUPTHER

## 2020-11-04 NOTE — PATIENT INSTRUCTIONS
Increase furosemide to 40 mg every morning and potassium to 1 tablet instead of half tablet every morning for the next 2 mornings.  We will call you Friday with lab results and further instructions.  Use compression to legs with Ace wraps for compression socks.  Elevate legs as much as possible. continue regular activity  Labs today

## 2020-11-04 NOTE — PROGRESS NOTES
"Amparo Valdes  2/3/1930  [id number]  Patient Care Team:  Nj Mckeon MD as PCP - General  Nj Mckeon MD as PCP - Family Medicine  Harjeet Martinez MD as Consulting Physician (Interventional Cardiology)    Amparo Valdes is a pleasant 89 y.o. female who presents for evaluation of Leg Swelling and Joint Swelling    This patient is accompanied by their daughter, Tamiko, who contributes to the history of their care.  Chief Complaint   Patient presents with   • Leg Swelling   • Joint Swelling       HPI:   Increased leg swelling x 1 mo, L>R.  Reports her left leg was draining some at times but that is better.  Takes lasix 40 mg instead of 20 mg on 2 occassions this week.  Hx CHF. Last visit here wt was 146, now 157. Has Chronic sob, O2 HS and prn daytime \"when I'm not busy\".  cannot lay flat so elevating legs is difficult.  Thinks she may have some compression socks at home.  She lives in [facility] living but goes home or to her daughters house on avg 2 days a week for the day.  PAF:  No since cardioversion that she knows of, denies palpitaitons or inc. Sob.  On Eliquis BID.  Past Medical History:   Diagnosis Date   • Arthritis    • Cancer, skin, squamous cell 12/27/2012   • Colon polyps 1987    clear 1994   • COPD (chronic obstructive pulmonary disease) (CMS/[facility])    • Family history of coronary artery disease    • Family history of hyperlipidemia    • Family history of hypertension    • Family history of stroke    • Femoral hernia of right side 12/13/2011   • Hyperlipidemia    • Hypertension    • Hypothyroidism    • Osteoarthritis    • Osteoporosis    • Pneumonia    • Rectal fistula     repair   • SBO (small bowel obstruction) (CMS/HCC) 09/13/2012    Had right inguinal hernia repair   • Sepsis (CMS/[facility]) 04/2014    - Drug therapy    • Stress incontinence     A and P  repair   • Thyroid disease      Past Surgical History:   Procedure Laterality Date   • ANAL FISTULA REPAIR     • CARDIAC SURGERY  10/2011    stents " placed   • CATARACT EXTRACTION     • CORONARY ANGIOPLASTY WITH STENT PLACEMENT  11/2011    x3   • HYSTERECTOMY     • INGUINAL HERNIA REPAIR     • OTHER SURGICAL HISTORY      thyroid uptake   • VAGINAL VAULT SUSPENSION       Family History   Problem Relation Age of Onset   • Stroke Mother    • Heart disease Father    • Hypertension Brother    • Diabetes Brother    • Diabetes Daughter    • Heart disease Daughter    • Hypertension Son    • Hypertension Other    • Coronary artery disease Other    • Stroke Other    • Hyperlipidemia Other    • Other Other         ichthyosis     Social History     Tobacco Use   Smoking Status Former Smoker   Smokeless Tobacco Former User   Tobacco Comment    quit at approx age 60     Allergies   Allergen Reactions   • Acetaminophen Unknown (See Comments)     Darvocet- N100   • Baycol [Cerivastatin] Unknown (See Comments)     unknown   • Ciprofloxacin Unknown (See Comments)     Unknown   • Ciprofloxacin Hcl Unknown (See Comments)     unknown   • Codeine Unknown (See Comments)     unknown   • Fosamax [Alendronate Sodium] Unknown (See Comments)     unknown   • Meperidine Unknown (See Comments)     unknown   • Methadone Unknown (See Comments)     unknown   • Morphine Unknown (See Comments)     unknown   • Naloxone Unknown (See Comments)     unknown   • Propoxyphene Unknown (See Comments)     darvocet-n 100   • Sulfa Antibiotics Unknown (See Comments)     unknown   • Ubidecarenone GI Intolerance   • Zocor [Simvastatin] Unknown (See Comments)     unknown       Current Outpatient Medications:   •  apixaban (ELIQUIS) 5 MG tablet tablet, Take 1 tablet by mouth 2 (Two) Times a Day., Disp: 180 tablet, Rfl: 3  •  atorvastatin (LIPITOR) 40 MG tablet, Take 1 tablet by mouth Daily., Disp: 90 tablet, Rfl: 3  •  calcium carbonate-vitamin d (CALTRATE 600+D) 600-400 MG-UNIT per tablet, Take 1 tablet by mouth Daily., Disp: , Rfl:   •  carvedilol (COREG) 6.25 MG tablet, Take 1 tablet by mouth 2 (Two) Times a  Day With Meals., Disp: 180 tablet, Rfl: 3  •  docusate sodium (COLACE) 100 MG capsule, 100 mg Daily., Disp: , Rfl:   •  furosemide (LASIX) 20 MG tablet, Take 2 tablets daily as of 11/4/20, Disp: 90 tablet, Rfl: 3  •  gabapentin (NEURONTIN) 300 MG capsule, Take 1 capsule by mouth 2 (Two) Times a Day., Disp: 180 capsule, Rfl: 0  •  levothyroxine (SYNTHROID, LEVOTHROID) 50 MCG tablet, Note chg dose to 50 mcg from 75, Disp: 90 tablet, Rfl: 3  •  liothyronine (CYTOMEL) 5 MCG tablet, Take 1 tablet by mouth Daily., Disp: 90 tablet, Rfl: 3  •  Multiple Vitamins-Minerals (CENTRUM SILVER PO), Take 1 tablet by mouth Daily., Disp: , Rfl:   •  O2 (OXYGEN), Inhale 2 L/min Every Night. Takes 3 l every night, Disp: , Rfl:   •  pantoprazole (PROTONIX) 40 MG EC tablet, Take 1 tablet by mouth Daily., Disp: 90 tablet, Rfl: 3  •  potassium chloride (MICRO-K) 10 MEQ CR capsule, Take 1 tablet daily as of 11/4/20, Disp: 45 capsule, Rfl: 3  •  Propylene Glycol (Systane Complete) 0.6 % solution, Apply 1 drop to eye(s) as directed by provider Daily As Needed., Disp: , Rfl:   •  trimethoprim (TRIMPEX) 100 MG tablet, Take 1 tablet by mouth Daily., Disp: 90 tablet, Rfl: 3    Review of Systems   Constitutional: Negative for chills, fatigue and fever.   HENT: Positive for congestion. Negative for ear pain and sinus pressure.    Respiratory: Positive for shortness of breath (due to COPD and CHF) and wheezing (due to COPD and CHF). Negative for cough and chest tightness.    Cardiovascular: Negative for chest pain and palpitations.   Gastrointestinal: Negative for abdominal pain, blood in stool, constipation and diarrhea.   Skin: Negative for color change.   Allergic/Immunologic: Negative for environmental allergies.   Neurological: Negative for dizziness, speech difficulty and headache.   Psychiatric/Behavioral: Negative for decreased concentration. The patient is not nervous/anxious.      /84 (BP Location: Left arm, Patient Position: Sitting,  "Cuff Size: Adult)   Pulse 64   Temp 98 °F (36.7 °C) (Temporal)   Ht 165.1 cm (65\")   Wt 71.2 kg (157 lb)   BMI 26.13 kg/m²     Physical Exam  Constitutional:       Appearance: She is well-developed.   HENT:      Head: Normocephalic and atraumatic.      Comments: *wearing mask  Eyes:      Conjunctiva/sclera: Conjunctivae normal.      Pupils: Pupils are equal, round, and reactive to light.   Neck:      Musculoskeletal: Normal range of motion and neck supple.   Cardiovascular:      Rate and Rhythm: Normal rate and regular rhythm.      Pulses: Normal pulses.      Heart sounds: Murmur present. Systolic murmur present with a grade of 4/6.   Pulmonary:      Effort: Pulmonary effort is normal.      Breath sounds: Normal breath sounds.   Musculoskeletal: Normal range of motion.      Right lower leg: 3+ Edema present.      Left lower le+ Pitting Edema present.   Skin:     General: Skin is warm and dry.   Neurological:      Mental Status: She is alert and oriented to person, place, and time.   Psychiatric:         Mood and Affect: Mood normal.         Behavior: Behavior normal.         Thought Content: Thought content normal.         Judgment: Judgment normal.         Procedures    Results Review:  None    PHQ-9 Total Score:      Assessment/Plan:  Diagnoses and all orders for this visit:    1. Acute on chronic congestive heart failure, unspecified heart failure type (CMS/HCC) (Primary)  Comments:  incrase furosemide to 40mg and potassium to whole tablet for 2 days, will call Friday with lab results and further instructions.  Ace wraps to both legs today    2. Peripheral edema  -     Comprehensive Metabolic Panel; Future    3. SOB (shortness of breath)  Comments:  Continue nighttime oxygen during daytime as needed  Orders:  -     BNP; Future    4. Benign essential hypertension  Comments:  Continue regular medications, follow-up 2 weeks    Other orders  -     furosemide (LASIX) 20 MG tablet; Take 2 tablets daily as of " 11/4/20  Dispense: 90 tablet; Refill: 3  -     potassium chloride (MICRO-K) 10 MEQ CR capsule; Take 1 tablet daily as of 11/4/20  Dispense: 45 capsule; Refill: 3       Patient Instructions   Increase furosemide to 40 mg every morning and potassium to 1 tablet instead of half tablet every morning for the next 2 mornings.  We will call you Friday with lab results and further instructions.  Use compression to legs with Ace wraps for compression socks.  Elevate legs as much as possible. continue regular activity  Labs today    Plan of care reviewed with patient at the conclusion of today's visit. Education was provided regarding diagnosis, management and any prescribed or recommended OTC medications.  Patient verbalizes understanding of and agreement with management plan.    Return in about 2 weeks (around 11/18/2020) for Recheck JH or BH.    *Note that portions of this note were completed with a voice recognition program.  Efforts were made to edit the dictation but occasionally words are transcribed.    Kira Carvalho, APRN

## 2020-11-18 ENCOUNTER — OFFICE VISIT (OUTPATIENT)
Dept: INTERNAL MEDICINE | Facility: CLINIC | Age: 85
End: 2020-11-18

## 2020-11-18 VITALS
SYSTOLIC BLOOD PRESSURE: 132 MMHG | TEMPERATURE: 97.3 F | BODY MASS INDEX: 26.66 KG/M2 | HEART RATE: 64 BPM | WEIGHT: 160 LBS | DIASTOLIC BLOOD PRESSURE: 70 MMHG | HEIGHT: 65 IN

## 2020-11-18 DIAGNOSIS — I50.9 ACUTE ON CHRONIC CONGESTIVE HEART FAILURE, UNSPECIFIED HEART FAILURE TYPE (HCC): Primary | ICD-10-CM

## 2020-11-18 PROCEDURE — 99213 OFFICE O/P EST LOW 20 MIN: CPT | Performed by: NURSE PRACTITIONER

## 2020-11-18 RX ORDER — POTASSIUM CHLORIDE 750 MG/1
CAPSULE, EXTENDED RELEASE ORAL
Qty: 90 CAPSULE | Refills: 3 | Status: SHIPPED | OUTPATIENT
Start: 2020-11-18 | End: 2021-01-28 | Stop reason: SDUPTHER

## 2020-11-18 RX ORDER — FUROSEMIDE 40 MG/1
TABLET ORAL
Qty: 60 TABLET | Refills: 1 | Status: SHIPPED | OUTPATIENT
Start: 2020-11-18 | End: 2020-12-17 | Stop reason: SDUPTHER

## 2020-11-18 NOTE — PROGRESS NOTES
Amparo Valdes  2/3/1930  [id number]  Patient Care Team:  Nj Mckeon MD as PCP - General  Nj Mckeon MD as PCP - Family Medicine  Harjeet Martinez MD as Consulting Physician (Interventional Cardiology)    Amparo Valdes is a pleasant 89 y.o. female who presents for evaluation of Congestive Heart Failure (2 week follow up )    This patient is accompanied by their daughter who contributes to the history of their care.  Chief Complaint   Patient presents with   • Congestive Heart Failure     2 week follow up        HPI:   CHF exacerbation:  Increased furosemide to 40mg for 2 days after last visit but did not get message to continue that dose for the remainder of that week.  Leg swelling is not better and now into posterior thighs, sometimes abdomen.  Purchased some fitted compression socks which has helped. No change in chronic sob.  HS o2  Past Medical History:   Diagnosis Date   • Arthritis    • Cancer, skin, squamous cell 12/27/2012   • Colon polyps 1987    clear 1994   • COPD (chronic obstructive pulmonary disease) (CMS/[facility])    • Family history of coronary artery disease    • Family history of hyperlipidemia    • Family history of hypertension    • Family history of stroke    • Femoral hernia of right side 12/13/2011   • Hyperlipidemia    • Hypertension    • Hypothyroidism    • Osteoarthritis    • Osteoporosis    • Pneumonia    • Rectal fistula     repair   • SBO (small bowel obstruction) (CMS/[facility]) 09/13/2012    Had right inguinal hernia repair   • Sepsis (CMS/[facility]) 04/2014    - Drug therapy    • Stress incontinence     A and P  repair   • Thyroid disease      Past Surgical History:   Procedure Laterality Date   • ANAL FISTULA REPAIR     • CARDIAC SURGERY  10/2011    stents placed   • CATARACT EXTRACTION     • CORONARY ANGIOPLASTY WITH STENT PLACEMENT  11/2011    x3   • HYSTERECTOMY     • INGUINAL HERNIA REPAIR     • OTHER SURGICAL HISTORY      thyroid uptake   • VAGINAL VAULT SUSPENSION       Family  History   Problem Relation Age of Onset   • Stroke Mother    • Heart disease Father    • Hypertension Brother    • Diabetes Brother    • Diabetes Daughter    • Heart disease Daughter    • Hypertension Son    • Hypertension Other    • Coronary artery disease Other    • Stroke Other    • Hyperlipidemia Other    • Other Other         ichthyosis     Social History     Tobacco Use   Smoking Status Former Smoker   Smokeless Tobacco Former User   Tobacco Comment    quit at approx age 60     Allergies   Allergen Reactions   • Acetaminophen Unknown (See Comments)     Darvocet- N100   • Baycol [Cerivastatin] Unknown (See Comments)     unknown   • Ciprofloxacin Unknown (See Comments)     Unknown   • Ciprofloxacin Hcl Unknown (See Comments)     unknown   • Codeine Unknown (See Comments)     unknown   • Fosamax [Alendronate Sodium] Unknown (See Comments)     unknown   • Meperidine Unknown (See Comments)     unknown   • Methadone Unknown (See Comments)     unknown   • Morphine Unknown (See Comments)     unknown   • Naloxone Unknown (See Comments)     unknown   • Propoxyphene Unknown (See Comments)     darvocet-n 100   • Sulfa Antibiotics Unknown (See Comments)     unknown   • Ubidecarenone GI Intolerance   • Zocor [Simvastatin] Unknown (See Comments)     unknown       Current Outpatient Medications:   •  apixaban (ELIQUIS) 5 MG tablet tablet, Take 1 tablet by mouth 2 (Two) Times a Day., Disp: 180 tablet, Rfl: 3  •  atorvastatin (LIPITOR) 40 MG tablet, Take 1 tablet by mouth Daily., Disp: 90 tablet, Rfl: 3  •  calcium carbonate-vitamin d (CALTRATE 600+D) 600-400 MG-UNIT per tablet, Take 1 tablet by mouth Daily., Disp: , Rfl:   •  carvedilol (COREG) 6.25 MG tablet, Take 1 tablet by mouth 2 (Two) Times a Day With Meals., Disp: 180 tablet, Rfl: 3  •  docusate sodium (COLACE) 100 MG capsule, 100 mg Daily., Disp: , Rfl:   •  furosemide (LASIX) 40 MG tablet, Take 1 tablets daily, Disp: 60 tablet, Rfl: 1  •  gabapentin (NEURONTIN) 300 MG  "capsule, Take 1 capsule by mouth 2 (Two) Times a Day., Disp: 180 capsule, Rfl: 0  •  levothyroxine (SYNTHROID, LEVOTHROID) 50 MCG tablet, Note chg dose to 50 mcg from 75, Disp: 90 tablet, Rfl: 3  •  liothyronine (CYTOMEL) 5 MCG tablet, Take 1 tablet by mouth Daily., Disp: 90 tablet, Rfl: 3  •  Multiple Vitamins-Minerals (CENTRUM SILVER PO), Take 1 tablet by mouth Daily., Disp: , Rfl:   •  O2 (OXYGEN), Inhale 2 L/min Every Night. Takes 3 l every night, Disp: , Rfl:   •  pantoprazole (PROTONIX) 40 MG EC tablet, Take 1 tablet by mouth Daily., Disp: 90 tablet, Rfl: 3  •  potassium chloride (MICRO-K) 10 MEQ CR capsule, Take 1 tablet daily, Disp: 90 capsule, Rfl: 3  •  Propylene Glycol (Systane Complete) 0.6 % solution, Apply 1 drop to eye(s) as directed by provider Daily As Needed., Disp: , Rfl:   •  trimethoprim (TRIMPEX) 100 MG tablet, Take 1 tablet by mouth Daily., Disp: 90 tablet, Rfl: 3    Review of Systems   Constitutional: Negative for chills, fatigue and fever.   HENT: Negative for congestion, ear pain and sinus pressure.    Respiratory: Negative for cough, chest tightness, shortness of breath and wheezing.    Cardiovascular: Negative for chest pain and palpitations.   Gastrointestinal: Negative for abdominal pain, blood in stool and constipation.   Skin: Negative for color change.   Allergic/Immunologic: Negative for environmental allergies.   Neurological: Negative for dizziness, speech difficulty and headache.   Psychiatric/Behavioral: Negative for decreased concentration. The patient is not nervous/anxious.      /70 (BP Location: Left arm, Patient Position: Sitting, Cuff Size: Adult)   Pulse 64   Temp 97.3 °F (36.3 °C) (Temporal)   Ht 165.1 cm (65\")   Wt 72.6 kg (160 lb)   BMI 26.63 kg/m²     Physical Exam  Vitals signs reviewed.   Constitutional:       Appearance: She is well-developed.   Cardiovascular:      Rate and Rhythm: Rhythm irregularly irregular.   Pulmonary:      Effort: Pulmonary effort " is normal.   Musculoskeletal:      Right lower leg: Edema present.      Left lower leg: Edema present.      Comments: 3+ pitting edema bilat legs from thighs to toes   Skin:     General: Skin is warm and dry.   Neurological:      Mental Status: She is alert.   Psychiatric:         Behavior: Behavior normal.         Procedures    Results Review:  None    PHQ-9 Total Score:      Assessment/Plan:  Diagnoses and all orders for this visit:    1. Acute on chronic congestive heart failure, unspecified heart failure type (CMS/HCC) (Primary)  Comments:  continue compression socks, inc furosemide to 40 and potassium to full tab daily until next visit, call if new or worse sx    Other orders  -     furosemide (LASIX) 40 MG tablet; Take 1 tablets daily  Dispense: 60 tablet; Refill: 1  -     potassium chloride (MICRO-K) 10 MEQ CR capsule; Take 1 tablet daily  Dispense: 90 capsule; Refill: 3       There are no Patient Instructions on file for this visit.  Plan of care reviewed with patient at the conclusion of today's visit. Education was provided regarding diagnosis, management and any prescribed or recommended OTC medications.  Patient verbalizes understanding of and agreement with management plan.    Return in about 1 month (around 12/18/2020) for JH or BH to f/u, Labs next visit.    *Note that portions of this note were completed with a voice recognition program.  Efforts were made to edit the dictation but occasionally words are transcribed.    YESENIA Fernandez

## 2020-12-03 DIAGNOSIS — I48.19 PERSISTENT ATRIAL FIBRILLATION (HCC): ICD-10-CM

## 2020-12-07 ENCOUNTER — TELEPHONE (OUTPATIENT)
Dept: INTERNAL MEDICINE | Facility: CLINIC | Age: 85
End: 2020-12-07

## 2020-12-07 DIAGNOSIS — I48.19 PERSISTENT ATRIAL FIBRILLATION (HCC): ICD-10-CM

## 2020-12-07 DIAGNOSIS — I50.22 CHRONIC SYSTOLIC CONGESTIVE HEART FAILURE (HCC): ICD-10-CM

## 2020-12-07 DIAGNOSIS — E03.9 ACQUIRED HYPOTHYROIDISM: Primary | ICD-10-CM

## 2020-12-07 NOTE — TELEPHONE ENCOUNTER
PT DAUGHTER STATED THAT SHE WAS TO CALL IN A WEEK PRIOR TO HER MOM'S APPOINTMENT TO GET THE ORDER FOR HER LABS AND BLOOD WORK. SHE WOULD LIKE TO GET HER LABS DONE ON Wednesday 12-      CALLBACK: ELADIO [phone]

## 2020-12-09 ENCOUNTER — LAB (OUTPATIENT)
Dept: LAB | Facility: HOSPITAL | Age: 85
End: 2020-12-09

## 2020-12-09 DIAGNOSIS — E03.9 ACQUIRED HYPOTHYROIDISM: ICD-10-CM

## 2020-12-09 DIAGNOSIS — I50.22 CHRONIC SYSTOLIC CONGESTIVE HEART FAILURE (HCC): ICD-10-CM

## 2020-12-09 DIAGNOSIS — I48.19 PERSISTENT ATRIAL FIBRILLATION (HCC): ICD-10-CM

## 2020-12-09 LAB
BASOPHILS # BLD AUTO: 0.09 10*3/MM3 (ref 0–0.2)
BASOPHILS NFR BLD AUTO: 1.5 % (ref 0–1.5)
DEPRECATED RDW RBC AUTO: 50 FL (ref 37–54)
EOSINOPHIL # BLD AUTO: 0.06 10*3/MM3 (ref 0–0.4)
EOSINOPHIL NFR BLD AUTO: 1 % (ref 0.3–6.2)
ERYTHROCYTE [DISTWIDTH] IN BLOOD BY AUTOMATED COUNT: 14.2 % (ref 12.3–15.4)
HCT VFR BLD AUTO: 39.9 % (ref 34–46.6)
HGB BLD-MCNC: 13.7 G/DL (ref 12–15.9)
IMM GRANULOCYTES # BLD AUTO: 0.02 10*3/MM3 (ref 0–0.05)
IMM GRANULOCYTES NFR BLD AUTO: 0.3 % (ref 0–0.5)
LYMPHOCYTES # BLD AUTO: 1.22 10*3/MM3 (ref 0.7–3.1)
LYMPHOCYTES NFR BLD AUTO: 20.6 % (ref 19.6–45.3)
MCH RBC QN AUTO: 33.2 PG (ref 26.6–33)
MCHC RBC AUTO-ENTMCNC: 34.3 G/DL (ref 31.5–35.7)
MCV RBC AUTO: 96.6 FL (ref 79–97)
MONOCYTES # BLD AUTO: 0.61 10*3/MM3 (ref 0.1–0.9)
MONOCYTES NFR BLD AUTO: 10.3 % (ref 5–12)
NEUTROPHILS NFR BLD AUTO: 3.92 10*3/MM3 (ref 1.7–7)
NEUTROPHILS NFR BLD AUTO: 66.3 % (ref 42.7–76)
NRBC BLD AUTO-RTO: 0 /100 WBC (ref 0–0.2)
PLATELET # BLD AUTO: 245 10*3/MM3 (ref 140–450)
PMV BLD AUTO: 10.1 FL (ref 6–12)
RBC # BLD AUTO: 4.13 10*6/MM3 (ref 3.77–5.28)
WBC # BLD AUTO: 5.92 10*3/MM3 (ref 3.4–10.8)

## 2020-12-09 PROCEDURE — 83880 ASSAY OF NATRIURETIC PEPTIDE: CPT

## 2020-12-09 PROCEDURE — 85025 COMPLETE CBC W/AUTO DIFF WBC: CPT

## 2020-12-09 PROCEDURE — 80053 COMPREHEN METABOLIC PANEL: CPT

## 2020-12-09 PROCEDURE — 84443 ASSAY THYROID STIM HORMONE: CPT

## 2020-12-10 LAB
ALBUMIN SERPL-MCNC: 4.1 G/DL (ref 3.5–5.2)
ALBUMIN/GLOB SERPL: 1.6 G/DL
ALP SERPL-CCNC: 102 U/L (ref 39–117)
ALT SERPL W P-5'-P-CCNC: 26 U/L (ref 1–33)
ANION GAP SERPL CALCULATED.3IONS-SCNC: 11.3 MMOL/L (ref 5–15)
AST SERPL-CCNC: 34 U/L (ref 1–32)
BILIRUB SERPL-MCNC: 1.2 MG/DL (ref 0–1.2)
BUN SERPL-MCNC: 18 MG/DL (ref 8–23)
BUN/CREAT SERPL: 18.2 (ref 7–25)
CALCIUM SPEC-SCNC: 9.7 MG/DL (ref 8.2–9.6)
CHLORIDE SERPL-SCNC: 93 MMOL/L (ref 98–107)
CO2 SERPL-SCNC: 26.7 MMOL/L (ref 22–29)
CREAT SERPL-MCNC: 0.99 MG/DL (ref 0.57–1)
GFR SERPL CREATININE-BSD FRML MDRD: 53 ML/MIN/1.73
GLOBULIN UR ELPH-MCNC: 2.5 GM/DL
GLUCOSE SERPL-MCNC: 103 MG/DL (ref 65–99)
NT-PROBNP SERPL-MCNC: 9050 PG/ML (ref 0–1800)
POTASSIUM SERPL-SCNC: 4.4 MMOL/L (ref 3.5–5.2)
PROT SERPL-MCNC: 6.6 G/DL (ref 6–8.5)
SODIUM SERPL-SCNC: 131 MMOL/L (ref 136–145)
TSH SERPL DL<=0.05 MIU/L-ACNC: 8.99 UIU/ML (ref 0.27–4.2)

## 2020-12-17 ENCOUNTER — OFFICE VISIT (OUTPATIENT)
Dept: INTERNAL MEDICINE | Facility: CLINIC | Age: 85
End: 2020-12-17

## 2020-12-17 DIAGNOSIS — I10 BENIGN ESSENTIAL HYPERTENSION: ICD-10-CM

## 2020-12-17 DIAGNOSIS — J96.21 ACUTE ON CHRONIC RESPIRATORY FAILURE WITH HYPOXIA (HCC): ICD-10-CM

## 2020-12-17 DIAGNOSIS — I48.0 PAROXYSMAL ATRIAL FIBRILLATION (HCC): Primary | ICD-10-CM

## 2020-12-17 DIAGNOSIS — E03.9 ACQUIRED HYPOTHYROIDISM: ICD-10-CM

## 2020-12-17 DIAGNOSIS — E78.2 MIXED HYPERLIPIDEMIA: ICD-10-CM

## 2020-12-17 PROCEDURE — 99214 OFFICE O/P EST MOD 30 MIN: CPT | Performed by: INTERNAL MEDICINE

## 2020-12-17 RX ORDER — METOLAZONE 2.5 MG/1
TABLET ORAL
Qty: 30 TABLET | Refills: 1 | Status: SHIPPED | OUTPATIENT
Start: 2020-12-17 | End: 2021-01-27

## 2020-12-17 RX ORDER — FUROSEMIDE 40 MG/1
TABLET ORAL
Qty: 60 TABLET | Refills: 1 | Status: SHIPPED | OUTPATIENT
Start: 2020-12-17 | End: 2021-01-27

## 2020-12-18 VITALS
DIASTOLIC BLOOD PRESSURE: 82 MMHG | BODY MASS INDEX: 26.82 KG/M2 | WEIGHT: 161 LBS | TEMPERATURE: 96.9 F | HEART RATE: 62 BPM | SYSTOLIC BLOOD PRESSURE: 148 MMHG | HEIGHT: 65 IN

## 2020-12-18 NOTE — ASSESSMENT & PLAN NOTE
Denies initial blood pressure after walking to the office using a Rollator and extremely dyspneic was 200/100 after resting for approximately 10 minutes patient's blood pressure changed to 140/82, her initial saturation on sitting down in the exam room was 56% and after the 10-minute rest was 86% on room air the patient does not have her oxygen on at this time the patient is in mild heart failure with lower leg edema and mild dyspnea, encouraged oxygen at 2 L 24/7 as opposed to bedtime only we will change the patient's therapy by adding Zaroxolyn 2.5 mg daily for 5 days and then every Monday Wednesday and Friday, we will continue the carvedilol 6.25 twice daily Lasix 40 mg daily with the addition of the Zaroxolyn 2.5.

## 2020-12-18 NOTE — ASSESSMENT & PLAN NOTE
Patient is on Synthroid 50 MCG daily and Cytomel 5 mg daily her TSH was slightly elevated, we will not change thyroid dosing of levothyroxine 50 and Cytomel 5

## 2020-12-18 NOTE — PATIENT INSTRUCTIONS
Wear O2 24/7  Add Zaroxolyn 2.5 mg daily for 5 days and then every Monday Wednesday Friday  Recent labs discussed no change thyroid dose  Continue all other medications  Return visit as needed or at appointment on January 28, 2021

## 2020-12-18 NOTE — ASSESSMENT & PLAN NOTE
History of atrial fibrillation stable on Eliquis 5 mg twice daily carvedilol 6.25 twice daily and Lasix 40 mg the patient has developed mild heart failure for leg edema and shortness of breath  We will add Zaroxolyn 2.5 mg daily for 5 days and then every Monday Wednesday and Friday, she is to continue Lasix 40 mg daily.

## 2020-12-18 NOTE — ASSESSMENT & PLAN NOTE
The patient has COPD on 2 L of O2 at bedtime and occasionally during the day on ambulating back to the exam room the patient's O2 saturation was 56% after resting 10 minutes the patient's O2 saturation on room air was 86% her heart rate and blood pressure were improved she is encouraged to wear the oxygen 24/7 and we are changing her diuretics by adding Zaroxolyn 2.5 mg daily for 5 days and then every Monday Wednesday Friday

## 2021-01-01 ENCOUNTER — TELEPHONE (OUTPATIENT)
Dept: INTERNAL MEDICINE | Facility: CLINIC | Age: 86
End: 2021-01-01

## 2021-01-01 ENCOUNTER — READMISSION MANAGEMENT (OUTPATIENT)
Dept: CALL CENTER | Facility: HOSPITAL | Age: 86
End: 2021-01-01

## 2021-01-01 ENCOUNTER — OFFICE VISIT (OUTPATIENT)
Dept: INTERNAL MEDICINE | Facility: CLINIC | Age: 86
End: 2021-01-01

## 2021-01-01 ENCOUNTER — TRANSITIONAL CARE MANAGEMENT TELEPHONE ENCOUNTER (OUTPATIENT)
Dept: CALL CENTER | Facility: HOSPITAL | Age: 86
End: 2021-01-01

## 2021-01-01 ENCOUNTER — TELEMEDICINE (OUTPATIENT)
Dept: INTERNAL MEDICINE | Facility: CLINIC | Age: 86
End: 2021-01-01

## 2021-01-01 VITALS
TEMPERATURE: 98.4 F | HEART RATE: 60 BPM | HEIGHT: 65 IN | SYSTOLIC BLOOD PRESSURE: 110 MMHG | DIASTOLIC BLOOD PRESSURE: 70 MMHG | BODY MASS INDEX: 20.76 KG/M2 | WEIGHT: 124.6 LBS

## 2021-01-01 DIAGNOSIS — E03.9 ACQUIRED HYPOTHYROIDISM: ICD-10-CM

## 2021-01-01 DIAGNOSIS — N39.0 CHRONIC UTI: ICD-10-CM

## 2021-01-01 DIAGNOSIS — U07.1 COVID-19 VIRUS INFECTION: Primary | ICD-10-CM

## 2021-01-01 DIAGNOSIS — E78.2 MIXED HYPERLIPIDEMIA: ICD-10-CM

## 2021-01-01 DIAGNOSIS — I10 BENIGN ESSENTIAL HYPERTENSION: ICD-10-CM

## 2021-01-01 DIAGNOSIS — I48.19 PERSISTENT ATRIAL FIBRILLATION (HCC): ICD-10-CM

## 2021-01-01 DIAGNOSIS — J44.9 MODERATE COPD (CHRONIC OBSTRUCTIVE PULMONARY DISEASE) (HCC): ICD-10-CM

## 2021-01-01 DIAGNOSIS — I48.0 PAROXYSMAL ATRIAL FIBRILLATION (HCC): ICD-10-CM

## 2021-01-01 DIAGNOSIS — G62.9 NEUROPATHY: ICD-10-CM

## 2021-01-01 DIAGNOSIS — E78.2 MIXED HYPERLIPIDEMIA: Primary | ICD-10-CM

## 2021-01-01 DIAGNOSIS — I50.9 CHRONIC CONGESTIVE HEART FAILURE, UNSPECIFIED HEART FAILURE TYPE (HCC): ICD-10-CM

## 2021-01-01 PROCEDURE — 99442 PR PHYS/QHP TELEPHONE EVALUATION 11-20 MIN: CPT | Performed by: INTERNAL MEDICINE

## 2021-01-01 PROCEDURE — 99214 OFFICE O/P EST MOD 30 MIN: CPT | Performed by: INTERNAL MEDICINE

## 2021-01-01 RX ORDER — FUROSEMIDE 40 MG/1
TABLET ORAL
Qty: 90 TABLET | Refills: 3 | Status: SHIPPED | OUTPATIENT
Start: 2021-01-01 | End: 2022-01-01

## 2021-01-01 RX ORDER — LEVOTHYROXINE SODIUM 0.05 MG/1
TABLET ORAL
Qty: 90 TABLET | Refills: 3 | Status: SHIPPED | OUTPATIENT
Start: 2021-01-01

## 2021-01-01 RX ORDER — LIOTHYRONINE SODIUM 5 UG/1
TABLET ORAL
Qty: 90 TABLET | Refills: 1 | Status: SHIPPED | OUTPATIENT
Start: 2021-01-01 | End: 2021-01-01

## 2021-01-01 RX ORDER — PANTOPRAZOLE SODIUM 40 MG/1
TABLET, DELAYED RELEASE ORAL
Qty: 90 TABLET | Refills: 1 | Status: SHIPPED | OUTPATIENT
Start: 2021-01-01 | End: 2021-01-01 | Stop reason: SDUPTHER

## 2021-01-01 RX ORDER — ATORVASTATIN CALCIUM 40 MG/1
TABLET, FILM COATED ORAL
Qty: 90 TABLET | Refills: 2 | Status: SHIPPED | OUTPATIENT
Start: 2021-01-01 | End: 2022-01-01

## 2021-01-01 RX ORDER — POTASSIUM CHLORIDE 750 MG/1
TABLET, EXTENDED RELEASE ORAL
Qty: 90 TABLET | Refills: 3 | Status: SHIPPED | OUTPATIENT
Start: 2021-01-01

## 2021-01-01 RX ORDER — PANTOPRAZOLE SODIUM 40 MG/1
40 TABLET, DELAYED RELEASE ORAL DAILY
Qty: 90 TABLET | Refills: 1 | Status: SHIPPED | OUTPATIENT
Start: 2021-01-01 | End: 2022-01-01

## 2021-01-01 RX ORDER — GABAPENTIN 300 MG/1
CAPSULE ORAL
Qty: 180 CAPSULE | Refills: 0 | Status: SHIPPED | OUTPATIENT
Start: 2021-01-01 | End: 2022-01-01

## 2021-01-01 RX ORDER — GABAPENTIN 300 MG/1
CAPSULE ORAL
Qty: 180 CAPSULE | Refills: 0 | Status: SHIPPED | OUTPATIENT
Start: 2021-01-01 | End: 2021-01-01

## 2021-01-01 RX ORDER — LIOTHYRONINE SODIUM 5 UG/1
TABLET ORAL
Qty: 90 TABLET | Refills: 1 | Status: SHIPPED | OUTPATIENT
Start: 2021-01-01 | End: 2022-01-01 | Stop reason: SDUPTHER

## 2021-01-01 RX ORDER — CARVEDILOL 6.25 MG/1
6.25 TABLET ORAL 2 TIMES DAILY WITH MEALS
Qty: 180 TABLET | Refills: 3 | Status: SHIPPED | OUTPATIENT
Start: 2021-01-01

## 2021-01-06 RX ORDER — TRIMETHOPRIM 100 MG/1
100 TABLET ORAL DAILY
Qty: 90 TABLET | Refills: 3 | Status: SHIPPED | OUTPATIENT
Start: 2021-01-06 | End: 2022-01-01

## 2021-01-06 NOTE — TELEPHONE ENCOUNTER
Caller: ALYSON RAZO    Relationship: Emergency Contact    Best call back number: [phone]    Medication needed:   Requested Prescriptions     Pending Prescriptions Disp Refills   • trimethoprim (TRIMPEX) 100 MG tablet 90 tablet 3     Sig: Take 1 tablet by mouth Daily.       When do you need the refill by: 01/12/2021    What details did the patient provide when requesting the medication: Alyson states that the patient has a week left of the medication. She states that the patients urologist prescribed this to her but he is no longer practicing.   Does the patient have less than a 3 day supply:  [] Yes  [x] No    What is the patient's preferred pharmacy: [id number] - [place], [place] - [address] [place] [address] [phone] [facility] [phone] [facility]

## 2021-01-27 DIAGNOSIS — G62.9 NEUROPATHY: ICD-10-CM

## 2021-01-27 RX ORDER — GABAPENTIN 300 MG/1
CAPSULE ORAL
Qty: 180 CAPSULE | Refills: 0 | Status: SHIPPED | OUTPATIENT
Start: 2021-01-27 | End: 2021-04-29

## 2021-01-27 RX ORDER — FUROSEMIDE 40 MG/1
TABLET ORAL
Qty: 90 TABLET | Refills: 1 | Status: SHIPPED | OUTPATIENT
Start: 2021-01-27 | End: 2021-03-03

## 2021-01-27 RX ORDER — METOLAZONE 2.5 MG/1
TABLET ORAL
Qty: 90 TABLET | Refills: 1 | Status: SHIPPED | OUTPATIENT
Start: 2021-01-27 | End: 2021-03-03

## 2021-01-28 ENCOUNTER — OFFICE VISIT (OUTPATIENT)
Dept: INTERNAL MEDICINE | Facility: CLINIC | Age: 86
End: 2021-01-28

## 2021-01-28 ENCOUNTER — LAB (OUTPATIENT)
Dept: LAB | Facility: HOSPITAL | Age: 86
End: 2021-01-28

## 2021-01-28 VITALS
DIASTOLIC BLOOD PRESSURE: 60 MMHG | SYSTOLIC BLOOD PRESSURE: 126 MMHG | TEMPERATURE: 97.1 F | WEIGHT: 126.2 LBS | HEIGHT: 65 IN | BODY MASS INDEX: 21.02 KG/M2 | HEART RATE: 60 BPM

## 2021-01-28 DIAGNOSIS — K21.9 GASTROESOPHAGEAL REFLUX DISEASE WITHOUT ESOPHAGITIS: ICD-10-CM

## 2021-01-28 DIAGNOSIS — I44.0 FIRST DEGREE ATRIOVENTRICULAR BLOCK: ICD-10-CM

## 2021-01-28 DIAGNOSIS — Z00.00 MEDICARE ANNUAL WELLNESS VISIT, SUBSEQUENT: Primary | ICD-10-CM

## 2021-01-28 DIAGNOSIS — I50.9 ACUTE ON CHRONIC CONGESTIVE HEART FAILURE, UNSPECIFIED HEART FAILURE TYPE (HCC): ICD-10-CM

## 2021-01-28 DIAGNOSIS — J44.9 MODERATE COPD (CHRONIC OBSTRUCTIVE PULMONARY DISEASE) (HCC): ICD-10-CM

## 2021-01-28 DIAGNOSIS — N30.20 CHRONIC CYSTITIS: ICD-10-CM

## 2021-01-28 DIAGNOSIS — E78.2 MIXED HYPERLIPIDEMIA: ICD-10-CM

## 2021-01-28 DIAGNOSIS — E03.9 ACQUIRED HYPOTHYROIDISM: ICD-10-CM

## 2021-01-28 LAB
BASOPHILS # BLD AUTO: 0.09 10*3/MM3 (ref 0–0.2)
BASOPHILS NFR BLD AUTO: 1 % (ref 0–1.5)
CHOLEST SERPL-MCNC: 154 MG/DL (ref 0–200)
DEPRECATED RDW RBC AUTO: 47.7 FL (ref 37–54)
EOSINOPHIL # BLD AUTO: 0.12 10*3/MM3 (ref 0–0.4)
EOSINOPHIL NFR BLD AUTO: 1.3 % (ref 0.3–6.2)
ERYTHROCYTE [DISTWIDTH] IN BLOOD BY AUTOMATED COUNT: 13.4 % (ref 12.3–15.4)
HCT VFR BLD AUTO: 36.9 % (ref 34–46.6)
HDLC SERPL-MCNC: 65 MG/DL (ref 40–60)
HGB BLD-MCNC: 13.1 G/DL (ref 12–15.9)
IMM GRANULOCYTES # BLD AUTO: 0.04 10*3/MM3 (ref 0–0.05)
IMM GRANULOCYTES NFR BLD AUTO: 0.4 % (ref 0–0.5)
LDLC SERPL CALC-MCNC: 72 MG/DL (ref 0–100)
LDLC/HDLC SERPL: 1.1 {RATIO}
LYMPHOCYTES # BLD AUTO: 1.53 10*3/MM3 (ref 0.7–3.1)
LYMPHOCYTES NFR BLD AUTO: 16.3 % (ref 19.6–45.3)
MCH RBC QN AUTO: 34.8 PG (ref 26.6–33)
MCHC RBC AUTO-ENTMCNC: 35.5 G/DL (ref 31.5–35.7)
MCV RBC AUTO: 98.1 FL (ref 79–97)
MONOCYTES # BLD AUTO: 1.08 10*3/MM3 (ref 0.1–0.9)
MONOCYTES NFR BLD AUTO: 11.5 % (ref 5–12)
NEUTROPHILS NFR BLD AUTO: 6.5 10*3/MM3 (ref 1.7–7)
NEUTROPHILS NFR BLD AUTO: 69.5 % (ref 42.7–76)
NRBC BLD AUTO-RTO: 0 /100 WBC (ref 0–0.2)
PLATELET # BLD AUTO: 301 10*3/MM3 (ref 140–450)
PMV BLD AUTO: 10.4 FL (ref 6–12)
RBC # BLD AUTO: 3.76 10*6/MM3 (ref 3.77–5.28)
TRIGL SERPL-MCNC: 89 MG/DL (ref 0–150)
TSH SERPL DL<=0.05 MIU/L-ACNC: 1.02 UIU/ML (ref 0.27–4.2)
VLDLC SERPL-MCNC: 17 MG/DL (ref 5–40)
WBC # BLD AUTO: 9.36 10*3/MM3 (ref 3.4–10.8)

## 2021-01-28 PROCEDURE — 84443 ASSAY THYROID STIM HORMONE: CPT

## 2021-01-28 PROCEDURE — G0439 PPPS, SUBSEQ VISIT: HCPCS | Performed by: INTERNAL MEDICINE

## 2021-01-28 PROCEDURE — 80053 COMPREHEN METABOLIC PANEL: CPT

## 2021-01-28 PROCEDURE — 85025 COMPLETE CBC W/AUTO DIFF WBC: CPT

## 2021-01-28 PROCEDURE — 93000 ELECTROCARDIOGRAM COMPLETE: CPT | Performed by: INTERNAL MEDICINE

## 2021-01-28 PROCEDURE — 99397 PER PM REEVAL EST PAT 65+ YR: CPT | Performed by: INTERNAL MEDICINE

## 2021-01-28 PROCEDURE — 80061 LIPID PANEL: CPT

## 2021-01-28 RX ORDER — POTASSIUM CHLORIDE 750 MG/1
10 TABLET, EXTENDED RELEASE ORAL DAILY
COMMUNITY
Start: 2020-11-18 | End: 2021-01-01

## 2021-01-28 NOTE — PROGRESS NOTES
QUICK REFERENCE INFORMATION:  The ABCs of the Annual Wellness Visit    Subsequent Medicare Wellness Visit    HEALTH RISK ASSESSMENT    2/3/1930    Recent Hospitalizations:  Recently treated at the following:  Other: [facility].  Patient admitted [facility] on May 18, 2020 and discharged on 2000 9/21/2020 for acute respiratory failure improved        Current Medical Providers:  Patient Care Team:  Nj Mckeon MD as PCP - General  Nj Mckeon MD as PCP - Family Medicine  Harjeet Martinez MD as Consulting Physician (Interventional Cardiology)        Smoking Status:  Social History     Tobacco Use   Smoking Status Former Smoker   Smokeless Tobacco Former User   Tobacco Comment    quit at approx age 60       Alcohol Consumption:  Social History     Substance and Sexual Activity   Alcohol Use Yes    Comment: rarely       Depression Screen:   PHQ-2/PHQ-9 Depression Screening 1/28/2021   Little interest or pleasure in doing things 0   Feeling down, depressed, or hopeless 0   Total Score 0       Health Habits and Functional and Cognitive Screening:  Functional & Cognitive Status 1/28/2021   Do you have difficulty preparing food and eating? No   Do you have difficulty bathing yourself, getting dressed or grooming yourself? No   Do you have difficulty using the toilet? No   Do you have difficulty moving around from place to place? No   Do you have trouble with steps or getting out of a bed or a chair? No   Current Diet Well Balanced Diet   Dental Exam Not up to date   Eye Exam Up to date   Exercise (times per week) 0 times per week   Current Exercises Include No Regular Exercise   Current Exercise Activities Include -   Do you need help using the phone?  No   Are you deaf or do you have serious difficulty hearing?  Yes   Do you need help with transportation? Yes   Do you need help shopping? Yes   Do you need help preparing meals?  Yes   Do you need help with housework?  Yes   Do you need help with  laundry? Yes   Do you need help taking your medications? Yes   Do you need help managing money? No   Do you ever drive or ride in a car without wearing a seat belt? No   Have you felt unusual stress, anger or loneliness in the last month? No   Who do you live with? Community   If you need help, do you have trouble finding someone available to you? No   Have you been bothered in the last four weeks by sexual problems? No   Do you have difficulty concentrating, remembering or making decisions? No       Fall Risk Screen:  JADEN Fall Risk Assessment was completed, and patient is at LOW risk for falls.Assessment completed on:1/28/2021    ACE III MINI        Does the patient have evidence of cognitive impairment? No    Aspirin use counseling: Does not need ASA (and currently is not on it)    Recent Lab Results:  CMP:  Lab Results   Component Value Date    BUN 18 12/09/2020    CREATININE 0.99 12/09/2020    EGFRIFNONA 53 (L) 12/09/2020    BCR 18.2 12/09/2020     (L) 12/09/2020    K 4.4 12/09/2020    CO2 26.7 12/09/2020    CALCIUM 9.7 (H) 12/09/2020    ALBUMIN 4.10 12/09/2020    BILITOT 1.2 12/09/2020    ALKPHOS 102 12/09/2020    AST 34 (H) 12/09/2020    ALT 26 12/09/2020     HbA1c:  Lab Results   Component Value Date    HGBA1C 6.40 (H) 12/04/2017     Microalbumin:  No results found for: MICROALBUR, POCMALB, POCCREAT  Lipid Panel  Lab Results   Component Value Date    CHOL 124 07/24/2020    TRIG 68 07/24/2020    HDL 50 07/24/2020    LDL 60 07/24/2020    AST 34 (H) 12/09/2020    ALT 26 12/09/2020       Visual Acuity:  No exam data present    Age-appropriate Screening Schedule:  Refer to the list below for future screening recommendations based on patient's age, sex and/or medical conditions. Orders for these recommended tests are listed in the plan section. The patient has been provided with a written plan.    Health Maintenance   Topic Date Due   • TDAP/TD VACCINES (1 - Tdap) 02/03/1949   • ZOSTER VACCINE (1 of 2)  02/03/1980   • DXA SCAN  01/25/2019   • LIPID PANEL  07/24/2021   • INFLUENZA VACCINE  Completed        Subjective   History of Present Illness  Patient is a 89-year-old female with recent congestive heart failure with rather massive diuresis of 25 pounds in the last 40 days on Lasix of 40 mg and Zaroxolyn 2.5 mg every Monday Wednesday Friday she is markedly improved no longer with PND O2 saturation is stable wearing her oxygen at night no longer short of breath with marked reduction in leg edema.  She has a history of coronary disease mixed hyperlipidemia essential hypertension and COPD remote atrial fibrillation overall generally improved.  The patient is a non-smoker nonalcohol drinker she denies headache or dizzy has very minor hearing loss denies visual acuity change no further coughing or wheezing denies chest pain or palpitations denies nausea vomiting complains of some extremity weakness and states edema has resolved.    Amparo Valdes is a 89 y.o. female who presents for a Subsequent Wellness Visit.    CHRONIC CONDITIONS    The following portions of the patient's history were reviewed and updated as appropriate: allergies, current medications, past family history, past medical history, past social history and past surgical history.    Outpatient Medications Prior to Visit   Medication Sig Dispense Refill   • apixaban (ELIQUIS) 5 MG tablet tablet Take 1 tablet by mouth 2 (Two) Times a Day. 180 tablet 3   • atorvastatin (LIPITOR) 40 MG tablet Take 1 tablet by mouth Daily. 90 tablet 3   • calcium carbonate-vitamin d (CALTRATE 600+D) 600-400 MG-UNIT per tablet Take 1 tablet by mouth Daily.     • carvedilol (COREG) 6.25 MG tablet Take 1 tablet by mouth 2 (Two) Times a Day With Meals. 180 tablet 3   • furosemide (LASIX) 40 MG tablet TAKE 1 TABLET BY MOUTH EVERY DAY 90 tablet 1   • gabapentin (NEURONTIN) 300 MG capsule TAKE 1 CAPSULE BY MOUTH TWICE A  capsule 0   • KLOR-CON 10 MEQ CR tablet Take 10 mEq by  mouth Daily.     • levothyroxine (SYNTHROID, LEVOTHROID) 50 MCG tablet Note chg dose to 50 mcg from 75 90 tablet 3   • liothyronine (CYTOMEL) 5 MCG tablet Take 1 tablet by mouth Daily. 90 tablet 3   • metOLazone (ZAROXOLYN) 2.5 MG tablet TAKE 1 TABLET BY MOUTH EVERY DAY FOR 5 DAYS THEN 1 TABLET BY MOUTH MON,WED,FRI 90 tablet 1   • Multiple Vitamins-Minerals (CENTRUM SILVER PO) Take 1 tablet by mouth Daily.     • O2 (OXYGEN) Inhale 2 L/min Every Night. Takes 3 l every night     • pantoprazole (PROTONIX) 40 MG EC tablet Take 1 tablet by mouth Daily. 90 tablet 3   • Propylene Glycol (Systane Complete) 0.6 % solution Apply 1 drop to eye(s) as directed by provider Daily As Needed.     • trimethoprim (TRIMPEX) 100 MG tablet Take 1 tablet by mouth Daily. 90 tablet 3   • potassium chloride (MICRO-K) 10 MEQ CR capsule Take 1 tablet daily 90 capsule 3     No facility-administered medications prior to visit.        Patient Active Problem List   Diagnosis   • Irritable bowel syndrome with constipation and diarrhea   • Chronic cystitis   • Benign essential hypertension   • ASHD (arteriosclerotic heart disease)   • Moderate COPD (chronic obstructive pulmonary disease) (CMS/[facility])   • Acquired hypothyroidism   • Other specified anemias   • Primary osteoarthritis involving multiple joints   • Advanced age   • Ischemic heart disease   • Gastroesophageal reflux disease without esophagitis   • Arteriosclerosis of coronary artery bypass graft   • Asthenia   • Chronic UTI   • CHF (congestive heart failure) (CMS/HCC)   • Femoral hernia   • First degree atrioventricular block   • Heart disorder   • Herpes zoster   • Hyponatremia   • Iron deficiency anemia   • Low blood pressure   • Mixed hyperlipidemia   • Osteoarthritis   • Parasomnia   • Urethritis   • Paroxysmal atrial fibrillation (CMS/HCC)   • Skin infection   • Acute on chronic respiratory failure with hypoxia (CMS/HCC)   • Lumbar pain   • Medicare annual wellness visit, subsequent        Advance Care Planning:  ACP discussion was held with the patient during this visit. Patient has an advance directive in EMR which is still valid.     Identification of Risk Factors:  Risk factors include: Cardiovascular risk.    Review of Systems   Constitutional: Negative for chills, fatigue and fever.   HENT: Positive for nosebleeds. Negative for congestion, ear pain and sinus pressure.    Respiratory: Negative for cough, chest tightness, shortness of breath and wheezing.    Cardiovascular: Negative for chest pain and palpitations.   Gastrointestinal: Positive for anal bleeding. Negative for abdominal pain, blood in stool and constipation.   Genitourinary: Positive for hematuria.   Skin: Negative for color change.   Allergic/Immunologic: Negative for environmental allergies.   Neurological: Positive for headaches. Negative for dizziness and speech difficulty.   Psychiatric/Behavioral: Negative for confusion. The patient is not nervous/anxious.        Compared to one year ago, the patient feels her physical health is better.  Compared to one year ago, the patient feels her mental health is better.    Objective     Physical Exam  Vitals signs and nursing note reviewed.   Constitutional:       General: She is not in acute distress.     Appearance: Normal appearance. She is normal weight. She is not ill-appearing or toxic-appearing.   HENT:      Head: Normocephalic and atraumatic.      Right Ear: Ear canal normal.      Left Ear: Ear canal normal.      Nose: Nose normal.      Mouth/Throat:      Mouth: Mucous membranes are moist.      Pharynx: Oropharynx is clear.   Eyes:      Extraocular Movements: Extraocular movements intact.      Conjunctiva/sclera: Conjunctivae normal.      Pupils: Pupils are equal, round, and reactive to light.   Neck:      Musculoskeletal: Normal range of motion and neck supple. No neck rigidity or muscular tenderness.      Vascular: Carotid bruit present.   Cardiovascular:      Rate and  Rhythm: Normal rate. Rhythm irregular.      Pulses: Normal pulses.      Heart sounds: Murmur present.   Pulmonary:      Effort: Pulmonary effort is normal. No respiratory distress.      Breath sounds: Normal breath sounds. No stridor. No wheezing, rhonchi or rales.   Chest:      Chest wall: No tenderness.   Abdominal:      General: Abdomen is flat. Bowel sounds are normal.      Palpations: Abdomen is soft. There is no mass.      Tenderness: There is no abdominal tenderness. There is no guarding or rebound.      Hernia: No hernia is present.   Musculoskeletal:         General: No swelling, tenderness, deformity or signs of injury.      Right lower leg: No edema.      Left lower leg: No edema.   Lymphadenopathy:      Cervical: No cervical adenopathy.   Skin:     General: Skin is warm and dry.      Capillary Refill: Capillary refill takes less than 2 seconds.   Neurological:      General: No focal deficit present.      Mental Status: She is alert and oriented to person, place, and time. Mental status is at baseline.      Motor: Weakness present.      Gait: Gait abnormal.   Psychiatric:         Mood and Affect: Mood normal.         Behavior: Behavior normal.         Thought Content: Thought content normal.         Judgment: Judgment normal.            ECG 12 Lead    Date/Time: 1/28/2021 7:19 PM  Performed by: Nj Mckeon MD  Authorized by: Nj Mckeon MD   Comparison: compared with previous ECG from 5/20/2019  Comparison to previous ECG: May 20 EKG shows atrial fibrillation, EKG of this day shows sinus rhythm with sinus arrhythmia first-degree AV block incomplete right bundle and left anterior fascicular block asymptomatic  Rhythm: sinus rhythm and sinus arrhythmia  Conduction: incomplete right bundle branch block, left anterior fascicular block and 1st degree AV block    Clinical impression: abnormal EKG  Comments: Today's EKG shows sinus rhythm sinus arrhythmia first-degree AV block incomplete right bundle left  "anterior fascicular block, compared with EKG of May 20, 2019 showing atrial fibrillation         Patient Wellness Counseling:   Plan of care reviewed with patient at the conclusion of today's visit. Education was provided in regards to diagnosis, diet and exercise, cervical cancer screening, self breast exams, breast cancer screening, and the importance of yearly mammograms.   Nutrition, family planning/contraception, physical activity, healthy weight,ways to reduce stress, adequate sleep, injury prevention, misuse of tobacco, alcohol and drugs, sexual behavior and STD's, dental health, mental health, and immunizations.    Management and any prescribed or recommended OTC medications.  Patient verbalizes understanding of and agreement with management plan.      Vitals:    01/28/21 1425   BP: 126/60   BP Location: Left arm   Patient Position: Sitting   Cuff Size: Adult   Pulse: 60   Temp: 97.1 °F (36.2 °C)   Weight: 57.2 kg (126 lb 3.2 oz)   Height: 165.1 cm (65\")   PainSc: 0-No pain       Patient's Body mass index is 21 kg/m². BMI is within normal parameters. No follow-up required..      Assessment/Plan   Problem List Items Addressed This Visit        Cardiac and Vasculature    CHF (congestive heart failure) (CMS/[facility])    Overview     History of chronic heart failure improved on carvedilol Eliquis Lipitor Lasix and O2         Current Assessment & Plan       History of congestive heart failure on Lasix 40 mg daily with recent addition of Zaroxolyn 2.5 mg Monday Wednesday Friday was started on December 17, 2020 and the patient has had a 25 pound diuresis in the past 40 days with weight going from 161 pounds to 126 pounds with marked improvement in breathing shortness of breath oxygenation and resolution of severe leg edema.  CMP is pending  We will reduce the Zaroxolyn to 1 day/week continue the Lasix 40 mg daily and potassium 10 mEq daily         Relevant Medications    carvedilol (COREG) 6.25 MG tablet    Other " Relevant Orders    Comprehensive Metabolic Panel    CBC & Differential    Lipid Panel    TSH    ECG 12 Lead    First degree atrioventricular block    Overview     Patient's current EKG shows sinus rhythm with sinus arrhythmia first-degree AV block and incomplete right bundle branch block and left anterior fascicular block, her EKG of May 20, 2019 showed atrial fibrillation.         Current Assessment & Plan     EKG abnormal with sinus arrhythmia first-degree AV block incomplete right bundle and left anterior fascicular block changed from May 20, 2019 showing atrial fibrillation continue current therapy of Eliquis 5 mg twice daily atorvastatin 40 mg daily carvedilol 6.25 mg twice daily Lasix 40 mg daily and reduce Zaroxolyn of 2.5 mg to 1/week and continue KCl at 10 mEq.         Relevant Medications    carvedilol (COREG) 6.25 MG tablet    apixaban (ELIQUIS) 5 MG tablet tablet    Other Relevant Orders    ECG 12 Lead    Mixed hyperlipidemia    Overview     History of mixed hyperlipidemia on atorvastatin 40 mg daily denies myalgia arthralgia         Current Assessment & Plan       History of hyperlipidemia on atorvastatin 40 mg daily, with a history of coronary disease essential hypertension CMP and lipid are pending no change therapy         Relevant Medications    atorvastatin (LIPITOR) 40 MG tablet       Endocrine and Metabolic    Acquired hypothyroidism    Overview     History of hypothyroidism on Synthroid 75 MCG daily patient's TSH is low will reduce the Synthroid to 50 MCG daily and continue the Cytomel at 5 mg daily         Current Assessment & Plan     Hypothyroidism on levothyroxine 50 MCG daily and Cytomel 5 MCG daily continue therapy TSH and CMP pending         Relevant Medications    levothyroxine (SYNTHROID, LEVOTHROID) 50 MCG tablet    carvedilol (COREG) 6.25 MG tablet    liothyronine (CYTOMEL) 5 MCG tablet       Gastrointestinal Abdominal     Gastroesophageal reflux disease without esophagitis     Overview     History of GERD and currently stable on Protonix 40 mg daily denies reflux abdominal pain         Current Assessment & Plan     History of chronic GERD gastritis and esophagitis with occasional dysphagia particularly pills, continue Protonix 40 mg daily encourage plenty of water for hydration and for improvement of swallowing denies aspiration continue Protonix         Relevant Medications    pantoprazole (PROTONIX) 40 MG EC tablet       Genitourinary and Reproductive     Chronic cystitis    Overview     History of chronic cystitis and urinary tract infections currently on suppression therapy with trimethoprim 100 mg daily and overall doing well         Current Assessment & Plan     Continue trimethoprim 100 mg daily            Health Encounters    Medicare annual wellness visit, subsequent - Primary    Overview     Patient is 89-year-old female presents for annual wellness visit and physical examination            Pulmonary and Pneumonias    Moderate COPD (chronic obstructive pulmonary disease) (CMS/[facility])    Overview     History of mild COPD on O2 at night         Current Assessment & Plan       History of mild COPD on oxygen at night with a history of coronary disease and congestive heart failure markedly improved continue O2 in the evening               Patient Self-Management and Personalized Health Advice  The patient has been provided with information about: prevention of cardiac or vascular disease and preventive services including:   · Annual Wellness Visit (AWV).    Outpatient Encounter Medications as of 1/28/2021   Medication Sig Dispense Refill   • apixaban (ELIQUIS) 5 MG tablet tablet Take 1 tablet by mouth 2 (Two) Times a Day. 180 tablet 3   • atorvastatin (LIPITOR) 40 MG tablet Take 1 tablet by mouth Daily. 90 tablet 3   • calcium carbonate-vitamin d (CALTRATE 600+D) 600-400 MG-UNIT per tablet Take 1 tablet by mouth Daily.     • carvedilol (COREG) 6.25 MG tablet Take 1 tablet by mouth 2  (Two) Times a Day With Meals. 180 tablet 3   • furosemide (LASIX) 40 MG tablet TAKE 1 TABLET BY MOUTH EVERY DAY 90 tablet 1   • gabapentin (NEURONTIN) 300 MG capsule TAKE 1 CAPSULE BY MOUTH TWICE A  capsule 0   • KLOR-CON 10 MEQ CR tablet Take 10 mEq by mouth Daily.     • levothyroxine (SYNTHROID, LEVOTHROID) 50 MCG tablet Note chg dose to 50 mcg from 75 90 tablet 3   • liothyronine (CYTOMEL) 5 MCG tablet Take 1 tablet by mouth Daily. 90 tablet 3   • metOLazone (ZAROXOLYN) 2.5 MG tablet TAKE 1 TABLET BY MOUTH EVERY DAY FOR 5 DAYS THEN 1 TABLET BY MOUTH MON,WED,FRI 90 tablet 1   • Multiple Vitamins-Minerals (CENTRUM SILVER PO) Take 1 tablet by mouth Daily.     • O2 (OXYGEN) Inhale 2 L/min Every Night. Takes 3 l every night     • pantoprazole (PROTONIX) 40 MG EC tablet Take 1 tablet by mouth Daily. 90 tablet 3   • Propylene Glycol (Systane Complete) 0.6 % solution Apply 1 drop to eye(s) as directed by provider Daily As Needed.     • trimethoprim (TRIMPEX) 100 MG tablet Take 1 tablet by mouth Daily. 90 tablet 3   • [DISCONTINUED] potassium chloride (MICRO-K) 10 MEQ CR capsule Take 1 tablet daily 90 capsule 3     No facility-administered encounter medications on file as of 1/28/2021.        Reviewed use of high risk medication in the elderly: yes  Reviewed for potential of harmful drug interactions in the elderly: yes    Follow Up:  Return in about 4 weeks (around 2/25/2021) for labs today, Recheck.     Patient Instructions   Lab pending  EKG abnormal and discussed  Reduce metolazone 2.5 mg to 1/week from 3 times per week  Continue Lasix 40 mg daily  Continue potassium 10 mEq daily  Continue O2 at night  Continue all other therapy  Continue healthy cardiac diet and ambulation with assistance  Return visit in 4 weeks or as needed      An After Visit Summary and PPPS with all of these plans were given to the patient.

## 2021-01-29 LAB
ALBUMIN SERPL-MCNC: 4.4 G/DL (ref 3.5–5.2)
ALBUMIN/GLOB SERPL: 1.4 G/DL
ALP SERPL-CCNC: 113 U/L (ref 39–117)
ALT SERPL W P-5'-P-CCNC: 34 U/L (ref 1–33)
ANION GAP SERPL CALCULATED.3IONS-SCNC: 12 MMOL/L (ref 5–15)
AST SERPL-CCNC: 36 U/L (ref 1–32)
BILIRUB SERPL-MCNC: 0.9 MG/DL (ref 0–1.2)
BUN SERPL-MCNC: 30 MG/DL (ref 8–23)
BUN/CREAT SERPL: 28.3 (ref 7–25)
CALCIUM SPEC-SCNC: 10.5 MG/DL (ref 8.2–9.6)
CHLORIDE SERPL-SCNC: 93 MMOL/L (ref 98–107)
CO2 SERPL-SCNC: 32 MMOL/L (ref 22–29)
CREAT SERPL-MCNC: 1.06 MG/DL (ref 0.57–1)
GFR SERPL CREATININE-BSD FRML MDRD: 49 ML/MIN/1.73
GLOBULIN UR ELPH-MCNC: 3.1 GM/DL
GLUCOSE SERPL-MCNC: 94 MG/DL (ref 65–99)
POTASSIUM SERPL-SCNC: 3.5 MMOL/L (ref 3.5–5.2)
PROT SERPL-MCNC: 7.5 G/DL (ref 6–8.5)
SODIUM SERPL-SCNC: 137 MMOL/L (ref 136–145)

## 2021-01-29 NOTE — ASSESSMENT & PLAN NOTE
History of mild COPD on oxygen at night with a history of coronary disease and congestive heart failure markedly improved continue O2 in the evening     Patient contacted and relayed Dr. Nusrat Kent advice. Patient does not currently need a refill and will get lab work done when things settle down as Dr. Silas Cuba advised.

## 2021-01-29 NOTE — ASSESSMENT & PLAN NOTE
History of congestive heart failure on Lasix 40 mg daily with recent addition of Zaroxolyn 2.5 mg Monday Wednesday Friday was started on December 17, 2020 and the patient has had a 25 pound diuresis in the past 40 days with weight going from 161 pounds to 126 pounds with marked improvement in breathing shortness of breath oxygenation and resolution of severe leg edema.  CMP is pending  We will reduce the Zaroxolyn to 1 day/week continue the Lasix 40 mg daily and potassium 10 mEq daily

## 2021-01-29 NOTE — ASSESSMENT & PLAN NOTE
Hypothyroidism on levothyroxine 50 MCG daily and Cytomel 5 MCG daily continue therapy TSH and CMP pending

## 2021-01-29 NOTE — ASSESSMENT & PLAN NOTE
EKG abnormal with sinus arrhythmia first-degree AV block incomplete right bundle and left anterior fascicular block changed from May 20, 2019 showing atrial fibrillation continue current therapy of Eliquis 5 mg twice daily atorvastatin 40 mg daily carvedilol 6.25 mg twice daily Lasix 40 mg daily and reduce Zaroxolyn of 2.5 mg to 1/week and continue KCl at 10 mEq.

## 2021-01-29 NOTE — ASSESSMENT & PLAN NOTE
History of chronic GERD gastritis and esophagitis with occasional dysphagia particularly pills, continue Protonix 40 mg daily encourage plenty of water for hydration and for improvement of swallowing denies aspiration continue Protonix

## 2021-01-29 NOTE — PATIENT INSTRUCTIONS
Lab pending  EKG abnormal and discussed  Reduce metolazone 2.5 mg to 1/week from 3 times per week  Continue Lasix 40 mg daily  Continue potassium 10 mEq daily  Continue O2 at night  Continue all other therapy  Continue healthy cardiac diet and ambulation with assistance  Return visit in 4 weeks or as needed

## 2021-01-29 NOTE — ASSESSMENT & PLAN NOTE
History of hyperlipidemia on atorvastatin 40 mg daily, with a history of coronary disease essential hypertension CMP and lipid are pending no change therapy

## 2021-02-04 RX ORDER — LIOTHYRONINE SODIUM 5 UG/1
TABLET ORAL
Qty: 90 TABLET | Refills: 1 | OUTPATIENT
Start: 2021-02-04

## 2021-02-04 RX ORDER — LEVOTHYROXINE SODIUM 0.05 MG/1
TABLET ORAL
Qty: 90 TABLET | Refills: 3 | Status: SHIPPED | OUTPATIENT
Start: 2021-02-04 | End: 2021-01-01 | Stop reason: SDUPTHER

## 2021-02-04 RX ORDER — CARVEDILOL 6.25 MG/1
6.25 TABLET ORAL 2 TIMES DAILY WITH MEALS
Qty: 180 TABLET | Refills: 3 | Status: SHIPPED | OUTPATIENT
Start: 2021-02-04 | End: 2021-01-01 | Stop reason: SDUPTHER

## 2021-02-04 NOTE — TELEPHONE ENCOUNTER
Caller: ELADIO RAZO    Relationship: Emergency Contact    Best call back number: [phone]     Medication needed:   Requested Prescriptions     Pending Prescriptions Disp Refills   • liothyronine (CYTOMEL) 5 MCG tablet [Pharmacy Med Name: LIOTHYRONINE SOD 5 MCG TAB] 90 tablet 1     Sig: TAKE 1 TABLET BY MOUTH EVERY DAY   • carvedilol (Coreg) 6.25 MG tablet 180 tablet 3     Sig: Take 1 tablet by mouth 2 (Two) Times a Day With Meals.       When do you need the refill by: ASAP    What details did the patient provide when requesting the medication: COMPLETELY OUT    Does the patient have less than a 3 day supply:  [] Yes  [] No    What is the patient's preferred pharmacy: [facility]Y #[id number] [address] [phone] [facility] [phone]

## 2021-02-12 ENCOUNTER — TELEPHONE (OUTPATIENT)
Dept: INTERNAL MEDICINE | Facility: CLINIC | Age: 86
End: 2021-02-12

## 2021-02-12 RX ORDER — LIOTHYRONINE SODIUM 5 UG/1
TABLET ORAL
Qty: 90 TABLET | Refills: 1 | Status: SHIPPED | OUTPATIENT
Start: 2021-02-12 | End: 2021-01-01

## 2021-02-18 ENCOUNTER — TELEPHONE (OUTPATIENT)
Dept: INTERNAL MEDICINE | Facility: CLINIC | Age: 86
End: 2021-02-18

## 2021-02-18 NOTE — TELEPHONE ENCOUNTER
BNP/ CMP ordered on 11/18/2020    Then ordered and done on 12/09/2020    Can I cancel order 11/18/2020 ?

## 2021-03-03 ENCOUNTER — OFFICE VISIT (OUTPATIENT)
Dept: INTERNAL MEDICINE | Facility: CLINIC | Age: 86
End: 2021-03-03

## 2021-03-03 VITALS
WEIGHT: 127 LBS | HEIGHT: 65 IN | HEART RATE: 64 BPM | SYSTOLIC BLOOD PRESSURE: 110 MMHG | DIASTOLIC BLOOD PRESSURE: 50 MMHG | TEMPERATURE: 98 F | BODY MASS INDEX: 21.16 KG/M2

## 2021-03-03 DIAGNOSIS — I50.9 ACUTE ON CHRONIC CONGESTIVE HEART FAILURE, UNSPECIFIED HEART FAILURE TYPE (HCC): Primary | ICD-10-CM

## 2021-03-03 DIAGNOSIS — E78.2 MIXED HYPERLIPIDEMIA: ICD-10-CM

## 2021-03-03 DIAGNOSIS — I48.0 PAROXYSMAL ATRIAL FIBRILLATION (HCC): ICD-10-CM

## 2021-03-03 DIAGNOSIS — E03.9 ACQUIRED HYPOTHYROIDISM: ICD-10-CM

## 2021-03-03 PROCEDURE — 99213 OFFICE O/P EST LOW 20 MIN: CPT | Performed by: INTERNAL MEDICINE

## 2021-03-03 RX ORDER — FUROSEMIDE 20 MG/1
TABLET ORAL
Qty: 30 TABLET | Refills: 2 | Status: SHIPPED | OUTPATIENT
Start: 2021-03-03 | End: 2021-04-15

## 2021-03-03 NOTE — ASSESSMENT & PLAN NOTE
Paroxysmal atrial fibrillation on Eliquis 5 mg twice daily and carvedilol 6.25 twice daily continue therapy

## 2021-03-03 NOTE — PROGRESS NOTES
[facility]     Amparo Valdes  2/3/1930   [id number]      Patient Care Team:  Nj Mckeon MD as PCP - General  Nj Mckeon MD as PCP - Family Medicine  Harjeet Martinez MD as Consulting Physician (Interventional Cardiology)  Dwight De Souza MD as Consulting Physician (Urology)    Chief Complaint::   Chief Complaint   Patient presents with   • Congestive Heart Failure     follow up            HPI  Patient 89-year-old female with atrial fibrillation and recent history of congestive heart failure with medication changes having undergone a 34 pound diuresis with marked improvement in lung function and currently no edema no longer with PND no significant shortness of breath she continues her oxygen at 2 L a minute denies headache or dizzy denies cough or wheeze denies chest pain or palpitations denies nausea vomiting no longer has edema.      Patient Active Problem List   Diagnosis   • Irritable bowel syndrome with constipation and diarrhea   • Chronic cystitis   • Benign essential hypertension   • ASHD (arteriosclerotic heart disease)   • Moderate COPD (chronic obstructive pulmonary disease) (CMS/[facility])   • Acquired hypothyroidism   • Other specified anemias   • Primary osteoarthritis involving multiple joints   • Advanced age   • Ischemic heart disease   • Gastroesophageal reflux disease without esophagitis   • Arteriosclerosis of coronary artery bypass graft   • Asthenia   • Chronic UTI   • CHF (congestive heart failure) (CMS/[facility])   • Femoral hernia   • First degree atrioventricular block   • Heart disorder   • Herpes zoster   • Hyponatremia   • Iron deficiency anemia   • Low blood pressure   • Mixed hyperlipidemia   • Osteoarthritis   • Parasomnia   • Urethritis   • Paroxysmal atrial fibrillation (CMS/HCC)   • Skin infection   • Acute on chronic respiratory failure with hypoxia (CMS/[facility])   • Lumbar pain   • Medicare annual wellness visit, subsequent        Past Medical History:   Diagnosis Date    • Arthritis    • Cancer, skin, squamous cell 12/27/2012   • Colon polyps 1987    clear 1994   • COPD (chronic obstructive pulmonary disease) (CMS/[facility])    • Family history of coronary artery disease    • Family history of hyperlipidemia    • Family history of hypertension    • Family history of stroke    • Femoral hernia of right side 12/13/2011   • Hyperlipidemia    • Hypertension    • Hypothyroidism    • Osteoarthritis    • Osteoporosis    • Pneumonia    • Rectal fistula     repair   • SBO (small bowel obstruction) (CMS/HCC) 09/13/2012    Had right inguinal hernia repair   • Sepsis (CMS/HCC) 04/2014    - Drug therapy    • Stress incontinence     A and P  repair   • Thyroid disease        Past Surgical History:   Procedure Laterality Date   • ANAL FISTULA REPAIR     • CARDIAC SURGERY  10/2011    stents placed   • CATARACT EXTRACTION     • CORONARY ANGIOPLASTY WITH STENT PLACEMENT  11/2011    x3   • HYSTERECTOMY     • INGUINAL HERNIA REPAIR     • OTHER SURGICAL HISTORY      thyroid uptake   • VAGINAL VAULT SUSPENSION         Family History   Problem Relation Age of Onset   • Stroke Mother    • Heart disease Father    • Hypertension Brother    • Diabetes Brother    • Diabetes Daughter    • Heart disease Daughter    • Hypertension Son    • Hypertension Other    • Coronary artery disease Other    • Stroke Other    • Hyperlipidemia Other    • Other Other         ichthyosis       Social History     Socioeconomic History   • Marital status:      Spouse name: Not on file   • Number of children: Not on file   • Years of education: Not on file   • Highest education level: Not on file   Tobacco Use   • Smoking status: Former Smoker   • Smokeless tobacco: Former User   • Tobacco comment: quit at approx age 60   Substance and Sexual Activity   • Alcohol use: Yes     Comment: rarely   • Drug use: No   • Sexual activity: Defer   Social History Narrative    Caffeine: 2 cups daily       Allergies   Allergen Reactions   •  "Acetaminophen Unknown (See Comments)     Darvocet- N100   • Baycol [Cerivastatin] Unknown (See Comments)     unknown   • Ciprofloxacin Unknown (See Comments)     Unknown   • Ciprofloxacin Hcl Unknown (See Comments)     unknown   • Codeine Unknown (See Comments)     unknown   • Fosamax [Alendronate Sodium] Unknown (See Comments)     unknown   • Meperidine Unknown (See Comments)     unknown   • Methadone Unknown (See Comments)     unknown   • Morphine Unknown (See Comments)     unknown   • Naloxone Unknown (See Comments)     unknown   • Propoxyphene Unknown (See Comments)     darvocet-n 100   • Sulfa Antibiotics Unknown (See Comments)     unknown   • Ubidecarenone GI Intolerance   • Zocor [Simvastatin] Unknown (See Comments)     unknown       Review of Systems   Constitutional: Negative for chills, fatigue and fever.   HENT: Negative for congestion, ear pain and sinus pressure.    Respiratory: Negative for cough, chest tightness, shortness of breath and wheezing.    Cardiovascular: Negative for chest pain and palpitations.   Gastrointestinal: Negative for abdominal pain, blood in stool and constipation.   Skin: Negative for color change.   Allergic/Immunologic: Negative for environmental allergies.   Neurological: Positive for headache. Negative for dizziness and speech difficulty.   Psychiatric/Behavioral: Negative for decreased concentration. The patient is not nervous/anxious.           Vital Signs  Vitals:    03/03/21 1425   BP: 110/50   BP Location: Left arm   Patient Position: Sitting   Cuff Size: Adult   Pulse: 64   Temp: 98 °F (36.7 °C)   Weight: 57.6 kg (127 lb)   Height: 165.1 cm (65\")   PainSc: 0-No pain     Body mass index is 21.13 kg/m².  Patient's Body mass index is 21.13 kg/m². BMI is within normal parameters. No follow-up required..     Advance Care Planning   ACP discussion was held with the patient during this visit. Patient has an advance directive in EMR which is still valid.        Current " Outpatient Medications:   •  apixaban (ELIQUIS) 5 MG tablet tablet, Take 1 tablet by mouth 2 (Two) Times a Day., Disp: 180 tablet, Rfl: 3  •  atorvastatin (LIPITOR) 40 MG tablet, Take 1 tablet by mouth Daily., Disp: 90 tablet, Rfl: 3  •  calcium carbonate-vitamin d (CALTRATE 600+D) 600-400 MG-UNIT per tablet, Take 1 tablet by mouth Daily., Disp: , Rfl:   •  carvedilol (Coreg) 6.25 MG tablet, Take 1 tablet by mouth 2 (Two) Times a Day With Meals., Disp: 180 tablet, Rfl: 3  •  furosemide (LASIX) 20 MG tablet, TAKE 1 TABLET BY MOUTH EVERY DAY, Disp: 30 tablet, Rfl: 2  •  gabapentin (NEURONTIN) 300 MG capsule, TAKE 1 CAPSULE BY MOUTH TWICE A DAY, Disp: 180 capsule, Rfl: 0  •  KLOR-CON 10 MEQ CR tablet, Take 10 mEq by mouth Daily., Disp: , Rfl:   •  levothyroxine (SYNTHROID, LEVOTHROID) 50 MCG tablet, Note chg dose to 50 mcg from 75, Disp: 90 tablet, Rfl: 3  •  liothyronine (CYTOMEL) 5 MCG tablet, TAKE 1 TABLET BY MOUTH EVERY DAY, Disp: 90 tablet, Rfl: 1  •  Multiple Vitamins-Minerals (CENTRUM SILVER PO), Take 1 tablet by mouth Daily., Disp: , Rfl:   •  O2 (OXYGEN), Inhale 2 L/min Every Night. Takes 3 l every night, Disp: , Rfl:   •  pantoprazole (PROTONIX) 40 MG EC tablet, Take 1 tablet by mouth Daily., Disp: 90 tablet, Rfl: 3  •  Propylene Glycol (Systane Complete) 0.6 % solution, Apply 1 drop to eye(s) as directed by provider Daily As Needed., Disp: , Rfl:   •  trimethoprim (TRIMPEX) 100 MG tablet, Take 1 tablet by mouth Daily., Disp: 90 tablet, Rfl: 3    Physical Exam     ACE III MINI         HEENT: No facial asymmetry  NECK: No masses bruit thyromegaly neck vein distention  CHEST: Distant breath sounds without rales wheezes or rhonchi  CARDIAC: Blood pressure heart rate stable no gallop or rub systolic murmurs heard  ABD: Liver and spleen are normal good bowel sounds  : Deferred  NEURO: Intact  PSYCH: Normal  EXTREM: No edema  Skin: Clear     Results Review:    No results found for this or any previous visit (from  the past 672 hour(s)).  Procedures    Medication Review: Medications reviewed and noted    Patient wellness counseling  Exercise: Encouraged ambulation with assistance  Diet: Healthy cardiac diet  Smoking: Non-smoker  Alcohol: None alcohol  Screening:    Assessment/Plan:    Problem List Items Addressed This Visit        Cardiac and Vasculature    CHF (congestive heart failure) (CMS/[facility]) - Primary    Overview     History of chronic heart failure improved on carvedilol Eliquis Lipitor Lasix and O2         Current Assessment & Plan       Patient has had a 34 pound weight loss with diuresis on Lasix and Zaroxolyn she is markedly improved currently compensated, no longer edematous lungs are clear.  We will reduce Lasix to 20 mg daily from 40  We will discontinue Zaroxolyn  She is to continue atorvastatin carvedilol Lasix 20.  And O2 2 L a minute.         Relevant Medications    carvedilol (Coreg) 6.25 MG tablet    Mixed hyperlipidemia    Overview     History of mixed hyperlipidemia on atorvastatin 40 mg daily denies myalgia arthralgia         Current Assessment & Plan       Mixed hyperlipidemia on atorvastatin 40 mg daily continue therapy         Relevant Medications    atorvastatin (LIPITOR) 40 MG tablet    Paroxysmal atrial fibrillation (CMS/[facility])    Overview     Atrial fibrillation noted on EKG symptomatic on May 20, 2019 refer to cardiology underwent cardioversion is on Eliquis 5 mg twice daily Coreg 6.25 twice daily with current heart rate of 60/min         Current Assessment & Plan     Paroxysmal atrial fibrillation on Eliquis 5 mg twice daily and carvedilol 6.25 twice daily continue therapy         Relevant Medications    carvedilol (Coreg) 6.25 MG tablet       Endocrine and Metabolic    Acquired hypothyroidism    Overview     History of hypothyroidism on Synthroid 75 MCG daily patient's TSH is low will reduce the Synthroid to 50 MCG daily and continue the Cytomel at 5 mg daily         Current Assessment & Plan      Hypothyroidism on levothyroxine 50 MCG daily and liothyroxine 5 MCG daily continue therapy         Relevant Medications    carvedilol (Coreg) 6.25 MG tablet    levothyroxine (SYNTHROID, LEVOTHROID) 50 MCG tablet    liothyronine (CYTOMEL) 5 MCG tablet           Patient Instructions   Congestive heart failure is markedly improved with a 34 pound diuresis  We will discontinue Zaroxolyn  We will reduce Lasix from 40 mg to 20 mg daily  Continue all other medications  Continue healthy diet and ambulation with assistance  Continue O2  Return visit in 6 weeks or as needed with lab work       Plan of care reviewed with patient at the conclusion of today's visit. Education was provided regarding diagnosis, management, and any prescribed or recommended OTC medications.Patient verbalizes understanding of and agreement with management plan.         Nj Mckeon MD      Note: Part of this note may be an electronic transcription/translation of spoken language to printed text using the Dragon Dictation system.      Answers for HPI/ROS submitted by the patient on 3/1/2021   What is the primary reason for your visit?: Other  Please describe your symptoms.: Follow up on diuretic correction of fluid retention  Have you had these symptoms before?: Yes  How long have you been having these symptoms?: Greater than 2 weeks  Please list any medications you are currently taking for this condition.: Regular daily diuretic and once weekly diuretic.  Please describe any probable cause for these symptoms. : Heart at reduced function

## 2021-03-04 NOTE — PATIENT INSTRUCTIONS
Congestive heart failure is markedly improved with a 34 pound diuresis  We will discontinue Zaroxolyn  We will reduce Lasix from 40 mg to 20 mg daily  Continue all other medications  Continue healthy diet and ambulation with assistance  Continue O2  Return visit in 6 weeks or as needed with lab work

## 2021-03-04 NOTE — ASSESSMENT & PLAN NOTE
Patient has had a 34 pound weight loss with diuresis on Lasix and Zaroxolyn she is markedly improved currently compensated, no longer edematous lungs are clear.  We will reduce Lasix to 20 mg daily from 40  We will discontinue Zaroxolyn  She is to continue atorvastatin carvedilol Lasix 20.  And O2 2 L a minute.

## 2021-03-07 NOTE — TELEPHONE ENCOUNTER
Caller: ELADIO RAZO    Relationship: Emergency Contact    Best call back number: [phone]    Medication needed:   Requested Prescriptions     Pending Prescriptions Disp Refills   • gabapentin (NEURONTIN) 300 MG capsule 180 capsule 0     Sig: Take 1 capsule by mouth 2 (Two) Times a Day.       When do you need the refill by:10/29/20    What details did the patient provide when requesting the medication: PATIENT HAS ONE PILL REMAINING    Does the patient have less than a 3 day supply:  [x] Yes  [] No    What is the patient's preferred pharmacy: [facility] #[id number] [address] [phone] [facility] [phone]              
Last seen:7/28/20  Next appointment:1/28/21      
Alert-The patient is alert, awake and responds to voice. The patient is oriented to time, place, and person. The triage nurse is able to obtain subjective information.

## 2021-04-15 ENCOUNTER — LAB (OUTPATIENT)
Dept: LAB | Facility: HOSPITAL | Age: 86
End: 2021-04-15

## 2021-04-15 ENCOUNTER — OFFICE VISIT (OUTPATIENT)
Dept: INTERNAL MEDICINE | Facility: CLINIC | Age: 86
End: 2021-04-15

## 2021-04-15 VITALS
TEMPERATURE: 97.7 F | HEIGHT: 65 IN | DIASTOLIC BLOOD PRESSURE: 80 MMHG | WEIGHT: 136.4 LBS | BODY MASS INDEX: 22.73 KG/M2 | SYSTOLIC BLOOD PRESSURE: 154 MMHG | HEART RATE: 60 BPM

## 2021-04-15 DIAGNOSIS — E03.9 ACQUIRED HYPOTHYROIDISM: ICD-10-CM

## 2021-04-15 DIAGNOSIS — I50.9 ACUTE ON CHRONIC CONGESTIVE HEART FAILURE, UNSPECIFIED HEART FAILURE TYPE (HCC): Primary | ICD-10-CM

## 2021-04-15 DIAGNOSIS — E78.2 MIXED HYPERLIPIDEMIA: ICD-10-CM

## 2021-04-15 DIAGNOSIS — I48.0 PAROXYSMAL ATRIAL FIBRILLATION (HCC): ICD-10-CM

## 2021-04-15 DIAGNOSIS — I50.9 ACUTE ON CHRONIC CONGESTIVE HEART FAILURE, UNSPECIFIED HEART FAILURE TYPE (HCC): ICD-10-CM

## 2021-04-15 LAB
ALBUMIN SERPL-MCNC: 4 G/DL (ref 3.5–5.2)
ALBUMIN/GLOB SERPL: 1.6 G/DL
ALP SERPL-CCNC: 103 U/L (ref 39–117)
ALT SERPL W P-5'-P-CCNC: 29 U/L (ref 1–33)
ANION GAP SERPL CALCULATED.3IONS-SCNC: 9 MMOL/L (ref 5–15)
AST SERPL-CCNC: 28 U/L (ref 1–32)
BILIRUB SERPL-MCNC: 0.8 MG/DL (ref 0–1.2)
BUN SERPL-MCNC: 21 MG/DL (ref 8–23)
BUN/CREAT SERPL: 19.8 (ref 7–25)
CALCIUM SPEC-SCNC: 9.4 MG/DL (ref 8.2–9.6)
CHLORIDE SERPL-SCNC: 97 MMOL/L (ref 98–107)
CO2 SERPL-SCNC: 28 MMOL/L (ref 22–29)
CREAT SERPL-MCNC: 1.06 MG/DL (ref 0.57–1)
GFR SERPL CREATININE-BSD FRML MDRD: 49 ML/MIN/1.73
GLOBULIN UR ELPH-MCNC: 2.5 GM/DL
GLUCOSE SERPL-MCNC: 94 MG/DL (ref 65–99)
NT-PROBNP SERPL-MCNC: 9115 PG/ML (ref 0–1800)
POTASSIUM SERPL-SCNC: 4.4 MMOL/L (ref 3.5–5.2)
PROT SERPL-MCNC: 6.5 G/DL (ref 6–8.5)
SODIUM SERPL-SCNC: 134 MMOL/L (ref 136–145)

## 2021-04-15 PROCEDURE — 99214 OFFICE O/P EST MOD 30 MIN: CPT | Performed by: INTERNAL MEDICINE

## 2021-04-15 PROCEDURE — 80053 COMPREHEN METABOLIC PANEL: CPT

## 2021-04-15 PROCEDURE — 83880 ASSAY OF NATRIURETIC PEPTIDE: CPT

## 2021-04-15 RX ORDER — FUROSEMIDE 40 MG/1
TABLET ORAL
Qty: 90 TABLET | Refills: 3 | Status: SHIPPED | OUTPATIENT
Start: 2021-04-15 | End: 2021-01-01 | Stop reason: SDUPTHER

## 2021-04-15 NOTE — PROGRESS NOTES
[facility]     Amparo Valdes  2/3/1930   [id number]      Patient Care Team:  Nj Mckeon MD as PCP - General  Nj Mckeon MD as PCP - Family Medicine  Harjeet Martinez MD as Consulting Physician (Interventional Cardiology)  Dwight De Souza MD as Consulting Physician (Urology)    Chief Complaint::   Chief Complaint   Patient presents with   • Congestive Heart Failure     follow up.  Wearing O2 at 3L/min.  Requesting RX for Inogen oxygen.    • Weight Gain     pain started gaining 1lb/day so she is alternating 20 mg and 40 mg of lasix every other day            HPI  Patient is 89-year-old female with paroxysmal atrial fibrillation with recent congestive heart failure with medication adjustments requiring Lasix and metolazone she had reached gio weight and then reasonable control however she has gained weight approximately 1 day per pound in the past 10 days she is no longer on metolazone but on Lasix 20 mg daily the daughter is increase the Lasix to 40 mg 2 to 3 days/week when she notices ankle edema.  Patient denies headache or dizzy cough wheeze or shortness of breath or chest pain and currently has no leg swelling.      Patient Active Problem List   Diagnosis   • Irritable bowel syndrome with constipation and diarrhea   • Chronic cystitis   • Benign essential hypertension   • ASHD (arteriosclerotic heart disease)   • Moderate COPD (chronic obstructive pulmonary disease) (CMS/[facility])   • Acquired hypothyroidism   • Other specified anemias   • Primary osteoarthritis involving multiple joints   • Advanced age   • Ischemic heart disease   • Gastroesophageal reflux disease without esophagitis   • Arteriosclerosis of coronary artery bypass graft   • Asthenia   • Chronic UTI   • CHF (congestive heart failure) (CMS/[facility])   • Femoral hernia   • First degree atrioventricular block   • Heart disorder   • Herpes zoster   • Hyponatremia   • Iron deficiency anemia   • Low blood pressure   • Mixed  hyperlipidemia   • Osteoarthritis   • Parasomnia   • Urethritis   • Paroxysmal atrial fibrillation (CMS/[facility])   • Skin infection   • Acute on chronic respiratory failure with hypoxia (CMS/HCC)   • Lumbar pain   • Medicare annual wellness visit, subsequent        Past Medical History:   Diagnosis Date   • Arthritis    • Cancer, skin, squamous cell 12/27/2012   • Colon polyps 1987    clear 1994   • COPD (chronic obstructive pulmonary disease) (CMS/HCC)    • Family history of coronary artery disease    • Family history of hyperlipidemia    • Family history of hypertension    • Family history of stroke    • Femoral hernia of right side 12/13/2011   • Hyperlipidemia    • Hypertension    • Hypothyroidism    • Osteoarthritis    • Osteoporosis    • Pneumonia    • Rectal fistula     repair   • SBO (small bowel obstruction) (CMS/HCC) 09/13/2012    Had right inguinal hernia repair   • Sepsis (CMS/HCC) 04/2014    - Drug therapy    • Stress incontinence     A and P  repair   • Thyroid disease        Past Surgical History:   Procedure Laterality Date   • ANAL FISTULA REPAIR     • CARDIAC SURGERY  10/2011    stents placed   • CATARACT EXTRACTION     • CORONARY ANGIOPLASTY WITH STENT PLACEMENT  11/2011    x3   • HYSTERECTOMY     • INGUINAL HERNIA REPAIR     • OTHER SURGICAL HISTORY      thyroid uptake   • VAGINAL VAULT SUSPENSION         Family History   Problem Relation Age of Onset   • Stroke Mother    • Heart disease Father    • Hypertension Brother    • Diabetes Brother    • Diabetes Daughter    • Heart disease Daughter    • Hypertension Son    • Hypertension Other    • Coronary artery disease Other    • Stroke Other    • Hyperlipidemia Other    • Other Other         ichthyosis       Social History     Socioeconomic History   • Marital status:      Spouse name: Not on file   • Number of children: Not on file   • Years of education: Not on file   • Highest education level: Not on file   Tobacco Use   • Smoking status:  "Former Smoker   • Smokeless tobacco: Former User   • Tobacco comment: quit at approx age 60   Substance and Sexual Activity   • Alcohol use: Yes     Comment: rarely   • Drug use: No   • Sexual activity: Defer       Allergies   Allergen Reactions   • Acetaminophen Unknown (See Comments)     Darvocet- N100   • Baycol [Cerivastatin] Unknown (See Comments)     unknown   • Ciprofloxacin Unknown (See Comments)     Unknown   • Ciprofloxacin Hcl Unknown (See Comments)     unknown   • Codeine Unknown (See Comments)     unknown   • Fosamax [Alendronate Sodium] Unknown (See Comments)     unknown   • Meperidine Unknown (See Comments)     unknown   • Methadone Unknown (See Comments)     unknown   • Morphine Unknown (See Comments)     unknown   • Naloxone Unknown (See Comments)     unknown   • Propoxyphene Unknown (See Comments)     darvocet-n 100   • Sulfa Antibiotics Unknown (See Comments)     unknown   • Ubidecarenone GI Intolerance   • Zocor [Simvastatin] Unknown (See Comments)     unknown       Review of Systems   Constitutional: Negative for chills, fatigue and fever.   HENT: Negative for congestion, ear pain and sinus pressure.    Respiratory: Positive for cough and shortness of breath. Negative for chest tightness and wheezing.    Cardiovascular: Negative for chest pain and palpitations.   Gastrointestinal: Negative for abdominal pain, blood in stool and constipation.   Skin: Negative for color change.   Allergic/Immunologic: Negative for environmental allergies.   Neurological: Negative for dizziness, speech difficulty and headache.   Psychiatric/Behavioral: Negative for decreased concentration. The patient is not nervous/anxious.             Vital Signs  Vitals:    04/15/21 1405   BP: 154/80   BP Location: Left arm   Patient Position: Sitting   Cuff Size: Adult   Pulse: 60   Temp: 97.7 °F (36.5 °C)   Weight: 61.9 kg (136 lb 6.4 oz)   Height: 165.1 cm (65\")   PainSc: 0-No pain     Body mass index is 22.7 " kg/m².  Patient's Body mass index is 22.7 kg/m². BMI is within normal parameters. No follow-up required..     Advance Care Planning   ACP discussion was held with the patient during this visit. Patient has an advance directive in EMR which is still valid.        Current Outpatient Medications:   •  apixaban (ELIQUIS) 5 MG tablet tablet, Take 1 tablet by mouth 2 (Two) Times a Day., Disp: 180 tablet, Rfl: 3  •  atorvastatin (LIPITOR) 40 MG tablet, Take 1 tablet by mouth Daily., Disp: 90 tablet, Rfl: 3  •  calcium carbonate-vitamin d (CALTRATE 600+D) 600-400 MG-UNIT per tablet, Take 1 tablet by mouth Daily., Disp: , Rfl:   •  carvedilol (Coreg) 6.25 MG tablet, Take 1 tablet by mouth 2 (Two) Times a Day With Meals., Disp: 180 tablet, Rfl: 3  •  furosemide (LASIX) 40 MG tablet, TAKE 1 TABLET BY MOUTH EVERY DAY, Disp: 90 tablet, Rfl: 3  •  gabapentin (NEURONTIN) 300 MG capsule, TAKE 1 CAPSULE BY MOUTH TWICE A DAY, Disp: 180 capsule, Rfl: 0  •  KLOR-CON 10 MEQ CR tablet, Take 10 mEq by mouth Daily., Disp: , Rfl:   •  levothyroxine (SYNTHROID, LEVOTHROID) 50 MCG tablet, Note chg dose to 50 mcg from 75, Disp: 90 tablet, Rfl: 3  •  liothyronine (CYTOMEL) 5 MCG tablet, TAKE 1 TABLET BY MOUTH EVERY DAY, Disp: 90 tablet, Rfl: 1  •  Multiple Vitamins-Minerals (CENTRUM SILVER PO), Take 1 tablet by mouth Daily., Disp: , Rfl:   •  O2 (OXYGEN), Inhale 2 L/min Every Night. Takes 3 l every night, Disp: , Rfl:   •  pantoprazole (PROTONIX) 40 MG EC tablet, Take 1 tablet by mouth Daily., Disp: 90 tablet, Rfl: 3  •  Propylene Glycol (Systane Complete) 0.6 % solution, Apply 1 drop to eye(s) as directed by provider Daily As Needed., Disp: , Rfl:   •  trimethoprim (TRIMPEX) 100 MG tablet, Take 1 tablet by mouth Daily., Disp: 90 tablet, Rfl: 3    Physical Exam     ACE III MINI         HEENT: No asymmetry pharynx is clear  NECK: No mass.  Thyromegaly neck veins are flat  CHEST: Few base crackles no wheeze or rales  CARDIAC: Irregular atrial fib  no gallop or rub  ABD: Liver and spleen normal  : Deferred  NEURO: Intact  PSYCH: Normal  EXTREM: Mild arthritis no edema  Skin: Clear     Results Review:    Recent Results (from the past 672 hour(s))   BNP    Collection Time: 04/15/21  3:22 PM    Specimen: Blood   Result Value Ref Range    proBNP 9,115.0 (H) 0.0-1,800.0 pg/mL   Comprehensive Metabolic Panel    Collection Time: 04/15/21  3:22 PM    Specimen: Blood   Result Value Ref Range    Glucose 94 65 - 99 mg/dL    BUN 21 8 - 23 mg/dL    Creatinine 1.06 (H) 0.57 - 1.00 mg/dL    Sodium 134 (L) 136 - 145 mmol/L    Potassium 4.4 3.5 - 5.2 mmol/L    Chloride 97 (L) 98 - 107 mmol/L    CO2 28.0 22.0 - 29.0 mmol/L    Calcium 9.4 8.2 - 9.6 mg/dL    Total Protein 6.5 6.0 - 8.5 g/dL    Albumin 4.00 3.50 - 5.20 g/dL    ALT (SGPT) 29 1 - 33 U/L    AST (SGOT) 28 1 - 32 U/L    Alkaline Phosphatase 103 39 - 117 U/L    Total Bilirubin 0.8 0.0 - 1.2 mg/dL    eGFR Non African Amer 49 (L) >60 mL/min/1.73    Globulin 2.5 gm/dL    A/G Ratio 1.6 g/dL    BUN/Creatinine Ratio 19.8 7.0 - 25.0    Anion Gap 9.0 5.0 - 15.0 mmol/L     Procedures    Medication Review: Medications reviewed and noted    Patient wellness counseling  Exercise: Continue ambulation and walking exercise  Diet: Healthy cardiac diet  Smoking: Non-smoker  Alcohol: No alcohol  Screening: BNP and CMP pending    Assessment/Plan:    Problem List Items Addressed This Visit        Cardiac and Vasculature    CHF (congestive heart failure) (CMS/[facility]) - Primary    Overview     History of chronic heart failure improved on carvedilol Eliquis Lipitor Lasix and O2         Current Assessment & Plan       Patient is congestive heart failure with secondary atrial fibrillation and remote coronary disease with stenting currently Lasix 20 mg daily with occasional 40 mg for leg swelling, CMP and BNP are pending we will increase the patient's Lasix to 40 mg daily and continue all other medications.         Relevant Medications     carvedilol (Coreg) 6.25 MG tablet    Other Relevant Orders    BNP (Completed)    Comprehensive Metabolic Panel (Completed)    Mixed hyperlipidemia    Overview     History of mixed hyperlipidemia on atorvastatin 40 mg daily denies myalgia arthralgia         Current Assessment & Plan       Mixed hyperlipidemia on atorvastatin 40 mg daily continue therapy         Relevant Medications    atorvastatin (LIPITOR) 40 MG tablet    Paroxysmal atrial fibrillation (CMS/HCC)    Overview     Atrial fibrillation noted on EKG symptomatic on May 20, 2019 refer to cardiology underwent cardioversion is on Eliquis 5 mg twice daily Coreg 6.25 twice daily with current heart rate of 60/min         Current Assessment & Plan     Paroxysmal atrial fibrillation with current sinus rhythm on Eliquis carvedilol and Lasix currently stable         Relevant Medications    carvedilol (Coreg) 6.25 MG tablet       Endocrine and Metabolic    Acquired hypothyroidism    Overview     History of hypothyroidism on Synthroid 75 MCG daily patient's TSH is low will reduce the Synthroid to 50 MCG daily and continue the Cytomel at 5 mg daily         Current Assessment & Plan     Hypothyroidism on Cytomel 5 MCG daily and levothyroxine 50 MCG daily continue therapy         Relevant Medications    carvedilol (Coreg) 6.25 MG tablet    levothyroxine (SYNTHROID, LEVOTHROID) 50 MCG tablet    liothyronine (CYTOMEL) 5 MCG tablet           Patient Instructions   Nonfasting labs pending  Continue all medications except increase furosemide to 40 mg daily from 20 mg  Continue healthy cardiac diet with no salt  Continue walking ambulation exercise  Patient would like [employer] oxygen delivery system because of ease of use and ability to ambulate with a lightweight device company was contacted and they will send requisition form patient has transient hypoxia, and coronary artery disease with chronic congestive heart failure on O2 at 3 L/min, and she is currently using a friend's  [employer] device  Return visit as needed over 3 months       Plan of care reviewed with patient at the conclusion of today's visit. Education was provided regarding diagnosis, management, and any prescribed or recommended OTC medications.Patient verbalizes understanding of and agreement with management plan.         Nj Mckeon MD      Note: Part of this note may be an electronic transcription/translation of spoken language to printed text using the Dragon Dictation system.      Answers for HPI/ROS submitted by the patient on 4/13/2021  Please describe your symptoms.: follow up on all problems, needs to get the office to order her a [employer]ogen portable oxygen compressor if needed to get it cleared with medicare. Has had some fluid retention. Is leaving off Propantozole some because thinks it damaging kidneys.  Have you had these symptoms before?: Yes  How long have you been having these symptoms?: Greater than 2 weeks  Please list any medications you are currently taking for this condition.: oxygen, diuretics  What is the primary reason for your visit?: Other

## 2021-04-17 NOTE — ASSESSMENT & PLAN NOTE
Patient is congestive heart failure with secondary atrial fibrillation and remote coronary disease with stenting currently Lasix 20 mg daily with occasional 40 mg for leg swelling, CMP and BNP are pending we will increase the patient's Lasix to 40 mg daily and continue all other medications.

## 2021-04-17 NOTE — ASSESSMENT & PLAN NOTE
Paroxysmal atrial fibrillation with current sinus rhythm on Eliquis carvedilol and Lasix currently stable

## 2021-04-17 NOTE — PATIENT INSTRUCTIONS
Nonfasting labs pending  Continue all medications except increase furosemide to 40 mg daily from 20 mg  Continue healthy cardiac diet with no salt  Continue walking ambulation exercise  Patient would like [employer] oxygen delivery system because of ease of use and ability to ambulate with a lightweight device company was contacted and they will send requisition form patient has transient hypoxia, and coronary artery disease with chronic congestive heart failure on O2 at 3 L/min, and she is currently using a friend's Inogen device  Return visit as needed over 3 months

## 2021-04-29 DIAGNOSIS — G62.9 NEUROPATHY: ICD-10-CM

## 2021-04-29 RX ORDER — GABAPENTIN 300 MG/1
CAPSULE ORAL
Qty: 180 CAPSULE | Refills: 0 | Status: SHIPPED | OUTPATIENT
Start: 2021-04-29 | End: 2021-01-01

## 2021-04-29 RX ORDER — FUROSEMIDE 20 MG/1
TABLET ORAL
Qty: 90 TABLET | OUTPATIENT
Start: 2021-04-29

## 2021-05-05 ENCOUNTER — TELEPHONE (OUTPATIENT)
Dept: INTERNAL MEDICINE | Facility: CLINIC | Age: 86
End: 2021-05-05

## 2021-05-05 NOTE — TELEPHONE ENCOUNTER
"Pt was seen on 4/15. Diuretics changed from metolazone 2.5mg once weekly and lasix 20mg daily to just lasix 40mg daily. Pt has \"significant\" swelling in feet, legs and abdomen along with SOB on exertion. Pt and her daughter want to know if she can take a metolazone to help get some of this fluid off. We can call them back on Alyson coughlin, cell phone.  "

## 2021-05-06 ENCOUNTER — LAB (OUTPATIENT)
Dept: LAB | Facility: HOSPITAL | Age: 86
End: 2021-05-06

## 2021-05-06 ENCOUNTER — OFFICE VISIT (OUTPATIENT)
Dept: INTERNAL MEDICINE | Facility: CLINIC | Age: 86
End: 2021-05-06

## 2021-05-06 VITALS
TEMPERATURE: 98.4 F | DIASTOLIC BLOOD PRESSURE: 88 MMHG | SYSTOLIC BLOOD PRESSURE: 172 MMHG | WEIGHT: 145 LBS | BODY MASS INDEX: 24.16 KG/M2 | HEART RATE: 64 BPM | HEIGHT: 65 IN

## 2021-05-06 DIAGNOSIS — I10 BENIGN ESSENTIAL HYPERTENSION: ICD-10-CM

## 2021-05-06 DIAGNOSIS — I50.9 ACUTE ON CHRONIC CONGESTIVE HEART FAILURE, UNSPECIFIED HEART FAILURE TYPE (HCC): Primary | ICD-10-CM

## 2021-05-06 DIAGNOSIS — I50.9 ACUTE ON CHRONIC CONGESTIVE HEART FAILURE, UNSPECIFIED HEART FAILURE TYPE (HCC): ICD-10-CM

## 2021-05-06 LAB — MAGNESIUM SERPL-MCNC: 2.1 MG/DL (ref 1.7–2.3)

## 2021-05-06 PROCEDURE — 36415 COLL VENOUS BLD VENIPUNCTURE: CPT

## 2021-05-06 PROCEDURE — 99213 OFFICE O/P EST LOW 20 MIN: CPT | Performed by: NURSE PRACTITIONER

## 2021-05-06 PROCEDURE — 83735 ASSAY OF MAGNESIUM: CPT

## 2021-05-06 PROCEDURE — 80053 COMPREHEN METABOLIC PANEL: CPT

## 2021-05-06 RX ORDER — METOLAZONE 5 MG/1
5 TABLET ORAL DAILY
Qty: 30 TABLET | Refills: 0 | Status: SHIPPED | OUTPATIENT
Start: 2021-05-06 | End: 2021-06-01

## 2021-05-06 NOTE — PATIENT INSTRUCTIONS
Take Metolazone 5mg daily for 1 week.  Then decrease dose to 5mg twice a week.     Try to weigh yourself daily as often as you can.  Record it so we can see if you are having any reduction in fluid weight.         Heart Failure Action Plan  A heart failure action plan helps you understand what to do when you have symptoms of heart failure. Follow the plan that was created by you and your health care provider. Review your plan each time you visit your health care provider.  Red zone  These signs and symptoms mean you should get medical help right away:  · You have trouble breathing when resting.  · You have a dry cough that is getting worse.  · You have swelling or pain in your legs or abdomen that is getting worse.  · You suddenly gain more than 2-3 lb (0.9-1.4 kg) in a day, or more than 5 lb (2.3 kg) in one week. This amount may be more or less depending on your condition.  · You have trouble staying awake or you feel confused.  · You have chest pain.  · You do not have an appetite.  · You pass out.  If you experience any of these symptoms:  · Call your local emergency services (911 in the [place] right away or seek help at the emergency department of the nearest hospital.  Yellow zone  These signs and symptoms mean your condition may be getting worse and you should make some changes:  · You have trouble breathing when you are active or you need to sleep with extra pillows.  · You have swelling in your legs or abdomen.  · You gain 2-3 lb (0.9-1.4 kg) in one day, or 5 lb (2.3 kg) in one week. This amount may be more or less depending on your condition.  · You get tired easily.  · You have trouble sleeping.  · You have a dry cough.  If you experience any of these symptoms:  · Contact your health care provider within the next day.  · Your health care provider may adjust your medicines.  Green zone  These signs mean you are doing well and can continue what you are doing:  · You do not have shortness of breath.  · You  have very little swelling or no new swelling.  · Your weight is stable (no gain or loss).  · You have a normal activity level.  · You do not have chest pain or any other new symptoms.  Follow these instructions at home:  · Take over-the-counter and prescription medicines only as told by your health care provider.  · Weigh yourself daily. Your target weight is __________ lb (__________ kg).  ? Call your health care provider if you gain more than __________ lb (__________ kg) in a day, or more than __________ lb (__________ kg) in one week.  · Eat a heart-healthy diet. Work with a diet and nutrition specialist (dietitian) to create an eating plan that is best for you.  · Keep all follow-up visits as told by your health care provider. This is important.  Where to find more information  · American Heart Association: www.heart.org  Summary  · Follow the action plan that was created by you and your health care provider.  · Get help right away if you have any symptoms in the Red zone.  This information is not intended to replace advice given to you by your health care provider. Make sure you discuss any questions you have with your health care provider.  Document Revised: 11/30/2018 Document Reviewed: 01/27/2018  [employer] Patient Education © 2021 [employer]

## 2021-05-06 NOTE — TELEPHONE ENCOUNTER
Please have her seen by an extender or Bulmaro and determine meds and possible referral to Cardiology for escalating medicines

## 2021-05-06 NOTE — PROGRESS NOTES
"  Follow Up Office Visit      Patient Name: Amparo Valdes  : 2/3/1930   MRN: [id number]   Care Team: Patient Care Team:  Nj Mckeon MD as PCP - General  Nj Mckeon MD as PCP - Family Medicine  Harjeet Martinez MD as Consulting Physician (Interventional Cardiology)  Dwight De Souza MD as Consulting Physician (Urology)    Chief Complaint:    Chief Complaint   Patient presents with   • Edema     Fluid retention from waist down        History of Present Illness: Amparo Valdes is a 89 y.o. female with pertinent medical history significant for hypertension, atherosclerotic heart disease, chronic CHF with recurrent acute CHF, anemia, mixed hyperlipidemia, paroxysmal A. fib on anticoagulant therapy Eliquis twice daily, osteoarthritis, GERD and hypothyroidism.    Presents today for fluid retention symptomatic with dyspnea on exertion, decreased activity tolerance, transient shortness of breath and swelling in bilateral thighs. Admits she does not monitor weight daily at home due to inconvenience of getting to the scale.  However, aware she has had weight gain \"I can feel it\".  Denies any associated chest pain, chest tightness, wheezing, headaches, dizziness or syncopal episodes.  On continuous O2 at home on 3L.  She has not increased amount with recent symptoms.      Last seen on 4/15/2021 for similar symptoms.  Noted medication change furosemide from 20 mg daily to 40 mg daily.  Previously on metolazone but weaned off and recent office visits.    Noted weight increase of 9 pounds today since last visit on 4/15/21.       Subjective      Review of Systems:   Review of Systems   Constitutional: Positive for fatigue. Negative for chills and fever.   Respiratory: Positive for cough and shortness of breath. Negative for chest tightness and wheezing.    Cardiovascular: Positive for leg swelling. Negative for chest pain and palpitations.   Gastrointestinal: Negative for abdominal pain, nausea and vomiting. "   Neurological: Negative for dizziness, light-headedness and headache.       I have reviewed and the following portions of the patient's history were updated as appropriate: past family history, past medical history, past social history, past surgical history and problem list.    Medications:     Current Outpatient Medications:   •  apixaban (ELIQUIS) 5 MG tablet tablet, Take 1 tablet by mouth 2 (Two) Times a Day., Disp: 180 tablet, Rfl: 3  •  atorvastatin (LIPITOR) 40 MG tablet, Take 1 tablet by mouth Daily., Disp: 90 tablet, Rfl: 3  •  calcium carbonate-vitamin d (CALTRATE 600+D) 600-400 MG-UNIT per tablet, Take 1 tablet by mouth Daily., Disp: , Rfl:   •  carvedilol (Coreg) 6.25 MG tablet, Take 1 tablet by mouth 2 (Two) Times a Day With Meals., Disp: 180 tablet, Rfl: 3  •  furosemide (LASIX) 40 MG tablet, TAKE 1 TABLET BY MOUTH EVERY DAY, Disp: 90 tablet, Rfl: 3  •  gabapentin (NEURONTIN) 300 MG capsule, TAKE 1 CAPSULE BY MOUTH TWICE A DAY, Disp: 180 capsule, Rfl: 0  •  KLOR-CON 10 MEQ CR tablet, Take 10 mEq by mouth Daily., Disp: , Rfl:   •  levothyroxine (SYNTHROID, LEVOTHROID) 50 MCG tablet, Note chg dose to 50 mcg from 75 (Patient taking differently: Take 50 mcg by mouth Daily. Note chg dose to 50 mcg from 75), Disp: 90 tablet, Rfl: 3  •  liothyronine (CYTOMEL) 5 MCG tablet, TAKE 1 TABLET BY MOUTH EVERY DAY, Disp: 90 tablet, Rfl: 1  •  Multiple Vitamins-Minerals (CENTRUM SILVER PO), Take 1 tablet by mouth Daily., Disp: , Rfl:   •  O2 (OXYGEN), Inhale 2 L/min Every Night. Takes 3 l every night, Disp: , Rfl:   •  pantoprazole (PROTONIX) 40 MG EC tablet, Take 1 tablet by mouth Daily., Disp: 90 tablet, Rfl: 3  •  Propylene Glycol (Systane Complete) 0.6 % solution, Apply 1 drop to eye(s) as directed by provider Daily As Needed., Disp: , Rfl:   •  trimethoprim (TRIMPEX) 100 MG tablet, Take 1 tablet by mouth Daily., Disp: 90 tablet, Rfl: 3  •  metOLazone (ZAROXOLYN) 5 MG tablet, Take 1 tablet by mouth Daily., Disp:  "30 tablet, Rfl: 0    Allergies:   Allergies   Allergen Reactions   • Acetaminophen Unknown (See Comments)     Darvocet- N100   • Baycol [Cerivastatin] Unknown (See Comments)     unknown   • Ciprofloxacin Unknown (See Comments)     Unknown   • Ciprofloxacin Hcl Unknown (See Comments)     unknown   • Codeine Unknown (See Comments)     unknown   • Fosamax [Alendronate Sodium] Unknown (See Comments)     unknown   • Meperidine Unknown (See Comments)     unknown   • Methadone Unknown (See Comments)     unknown   • Morphine Unknown (See Comments)     unknown   • Naloxone Unknown (See Comments)     unknown   • Propoxyphene Unknown (See Comments)     darvocet-n 100   • Sulfa Antibiotics Unknown (See Comments)     unknown   • Ubidecarenone GI Intolerance   • Zocor [Simvastatin] Unknown (See Comments)     unknown       Objective     Physical Exam:  Vital Signs:   Vitals:    05/06/21 1530   BP: 172/88   BP Location: Left arm   Patient Position: Sitting   Cuff Size: Adult   Pulse: 64   Temp: 98.4 °F (36.9 °C)   TempSrc: Temporal   Weight: 65.8 kg (145 lb)   Height: 165.1 cm (65\")   PainSc: 0-No pain     Body mass index is 24.13 kg/m².     Physical Exam  Vitals and nursing note reviewed.   Constitutional:       General: She is not in acute distress.     Comments: Accompanied by daughter   SU:      Head: Normocephalic and atraumatic.   Eyes:      General: No scleral icterus.     Conjunctiva/sclera: Conjunctivae normal.   Cardiovascular:      Rate and Rhythm: Normal rate.      Heart sounds: Murmur (III/VI systolic murmur) heard.     Pulmonary:      Effort: Pulmonary effort is normal. No respiratory distress.      Breath sounds: Normal breath sounds. No wheezing or rhonchi.      Comments: Currently of 3L via NC.   Abdominal:      General: Bowel sounds are normal. There is no distension.      Tenderness: There is no abdominal tenderness.   Musculoskeletal:      Cervical back: Normal range of motion and neck supple.      Right lower " leg: Edema (trace) present.      Left lower leg: Edema (trace) present.      Comments: Decreased mobility noted.  Using rollator for ambulation.     Neurological:      General: No focal deficit present.      Mental Status: She is alert and oriented to person, place, and time.   Psychiatric:         Mood and Affect: Mood normal.         Thought Content: Thought content normal.         Judgment: Judgment normal.         Assessment / Plan      Assessment/Plan:   Problems Addressed This Visit    ICD-10-CM ICD-9-CM   1. Acute on chronic congestive heart failure, unspecified heart failure type (CMS/[facility])  I50.9 428.0   2. Benign essential hypertension  I10 401.1         Acute on chronic CHF  - Noted 9 pound weight gain since last visit on 4/15/21  - Continue Lasix 40mg daily as prescribed, with Klor-con 10meq  - Plan for adding back metolazone 5mg daily x 1 week, then reduce to twice weekly dosing until follow up here with Dr. Mckeon  - Encouraged patient to weigh herself as often as possible to monitor gain/loss with added medication  - Briefly discussed possibility of referral to cardiology for further evaluation, but patient wishes to discuss with Dr. Mckeon first  - recheck cmp, magnesium level today    Hypertension  -noted elevation today at 172/88  - Plan to continue therapies of Coreg 6.25mg BID  - Will likely stabilize with reduction in fluid weight/diuresis        Plan of care reviewed with patient at the conclusion of today's visit. Education was provided regarding diagnosis and management.  Patient verbalizes understanding of and agreement with management plan.    Patient Instructions   Take Metolazone 5mg daily for 1 week.  Then decrease dose to 5mg twice a week.     Try to weigh yourself daily as often as you can.  Record it so we can see if you are having any reduction in fluid weight.         Heart Failure Action Plan  A heart failure action plan helps you understand what to do when you have symptoms of heart  failure. Follow the plan that was created by you and your health care provider. Review your plan each time you visit your health care provider.  Red zone  These signs and symptoms mean you should get medical help right away:  · You have trouble breathing when resting.  · You have a dry cough that is getting worse.  · You have swelling or pain in your legs or abdomen that is getting worse.  · You suddenly gain more than 2-3 lb (0.9-1.4 kg) in a day, or more than 5 lb (2.3 kg) in one week. This amount may be more or less depending on your condition.  · You have trouble staying awake or you feel confused.  · You have chest pain.  · You do not have an appetite.  · You pass out.  If you experience any of these symptoms:  · Call your local emergency services (911 in the [place] right away or seek help at the emergency department of the nearest hospital.  Yellow zone  These signs and symptoms mean your condition may be getting worse and you should make some changes:  · You have trouble breathing when you are active or you need to sleep with extra pillows.  · You have swelling in your legs or abdomen.  · You gain 2-3 lb (0.9-1.4 kg) in one day, or 5 lb (2.3 kg) in one week. This amount may be more or less depending on your condition.  · You get tired easily.  · You have trouble sleeping.  · You have a dry cough.  If you experience any of these symptoms:  · Contact your health care provider within the next day.  · Your health care provider may adjust your medicines.  Green zone  These signs mean you are doing well and can continue what you are doing:  · You do not have shortness of breath.  · You have very little swelling or no new swelling.  · Your weight is stable (no gain or loss).  · You have a normal activity level.  · You do not have chest pain or any other new symptoms.  Follow these instructions at home:  · Take over-the-counter and prescription medicines only as told by your health care provider.  · Weigh yourself  daily. Your target weight is __________ lb (__________ kg).  ? Call your health care provider if you gain more than __________ lb (__________ kg) in a day, or more than __________ lb (__________ kg) in one week.  · Eat a heart-healthy diet. Work with a diet and nutrition specialist (dietitian) to create an eating plan that is best for you.  · Keep all follow-up visits as told by your health care provider. This is important.  Where to find more information  · American Heart Association: www.heart.org  Summary  · Follow the action plan that was created by you and your health care provider.  · Get help right away if you have any symptoms in the Red zone.  This information is not intended to replace advice given to you by your health care provider. Make sure you discuss any questions you have with your health care provider.  Document Revised: 11/30/2018 Document Reviewed: 01/27/2018  Elsevier Patient Education © 2021 [employer]        Follow Up:   Return in about 1 month (around 6/6/2021) for Dr. Mckeon.        Myriam Bingham [facility] [address]    Please note that portions of this note may have been completed with a voice recognition program. Efforts were made to edit the dictations, but occasionally words are mistranscribed.

## 2021-05-07 LAB
ALBUMIN SERPL-MCNC: 4 G/DL (ref 3.5–5.2)
ALBUMIN/GLOB SERPL: 1.7 G/DL
ALP SERPL-CCNC: 105 U/L (ref 39–117)
ALT SERPL W P-5'-P-CCNC: 26 U/L (ref 1–33)
ANION GAP SERPL CALCULATED.3IONS-SCNC: 11.1 MMOL/L (ref 5–15)
AST SERPL-CCNC: 29 U/L (ref 1–32)
BILIRUB SERPL-MCNC: 0.8 MG/DL (ref 0–1.2)
BUN SERPL-MCNC: 19 MG/DL (ref 8–23)
BUN/CREAT SERPL: 22.9 (ref 7–25)
CALCIUM SPEC-SCNC: 9.2 MG/DL (ref 8.2–9.6)
CHLORIDE SERPL-SCNC: 94 MMOL/L (ref 98–107)
CO2 SERPL-SCNC: 26.9 MMOL/L (ref 22–29)
CREAT SERPL-MCNC: 0.83 MG/DL (ref 0.57–1)
GFR SERPL CREATININE-BSD FRML MDRD: 64 ML/MIN/1.73
GLOBULIN UR ELPH-MCNC: 2.4 GM/DL
GLUCOSE SERPL-MCNC: 107 MG/DL (ref 65–99)
POTASSIUM SERPL-SCNC: 4.7 MMOL/L (ref 3.5–5.2)
PROT SERPL-MCNC: 6.4 G/DL (ref 6–8.5)
SODIUM SERPL-SCNC: 132 MMOL/L (ref 136–145)

## 2021-05-12 ENCOUNTER — TELEPHONE (OUTPATIENT)
Dept: INTERNAL MEDICINE | Facility: CLINIC | Age: 86
End: 2021-05-12

## 2021-05-12 NOTE — TELEPHONE ENCOUNTER
Message noted with weight loss and improved breathing function having taken metolazone.  Suggest metolazone only if weight rises by 5 pounds.  Please inform patient of new instructions

## 2021-05-12 NOTE — TELEPHONE ENCOUNTER
Alyson Valdes ( no  verbal on file) is calling to report that the patient has lost 11 lbs of fluid weight ( while taking metOLazone (ZAROXOLYN) 5 MG tablet) and is doing well and is able to walk 4 times the amount with out pause or rest or breathlessness. Please advise

## 2021-06-01 ENCOUNTER — TELEPHONE (OUTPATIENT)
Dept: INTERNAL MEDICINE | Facility: CLINIC | Age: 86
End: 2021-06-01

## 2021-06-01 DIAGNOSIS — I48.19 PERSISTENT ATRIAL FIBRILLATION (HCC): ICD-10-CM

## 2021-06-01 RX ORDER — METOLAZONE 5 MG/1
TABLET ORAL
Qty: 30 TABLET | Refills: 0 | Status: SHIPPED | OUTPATIENT
Start: 2021-06-01 | End: 2021-06-07

## 2021-06-01 NOTE — TELEPHONE ENCOUNTER
Provider: WILLIAN    Caller: ELADIO RAZO     Relationship to Patient: DAUGHTER     Phone Number: [phone]    Reason for Call: PATIENT  IS HAVING A SHOW OF BLOOD 9,10, 1 AND 2.  PATIENT IS NOT CONCERNED ENOUGH TO COME IN BUT WOULD LIKE A CALL.

## 2021-06-01 NOTE — TELEPHONE ENCOUNTER
Hold Eliquis for 2 doses, then resume at 1/2 tablet or 2.5 mg twice daily and see in the office in 1 week please please inform

## 2021-06-01 NOTE — TELEPHONE ENCOUNTER
I returned patient's call.  States she's had gross hematuria this morning, several times, that lasted up until 1:00.  Most recent void at 2:30 was clear.  Denies clots in urine.  Denies dysuria or frequency.  Also states she's had a nose bleed earlier today.  Please advise

## 2021-06-07 ENCOUNTER — OFFICE VISIT (OUTPATIENT)
Dept: INTERNAL MEDICINE | Facility: CLINIC | Age: 86
End: 2021-06-07

## 2021-06-07 VITALS
WEIGHT: 128 LBS | HEART RATE: 68 BPM | HEIGHT: 65 IN | TEMPERATURE: 98.7 F | DIASTOLIC BLOOD PRESSURE: 70 MMHG | BODY MASS INDEX: 21.33 KG/M2 | SYSTOLIC BLOOD PRESSURE: 122 MMHG

## 2021-06-07 DIAGNOSIS — K21.9 GASTROESOPHAGEAL REFLUX DISEASE WITHOUT ESOPHAGITIS: ICD-10-CM

## 2021-06-07 DIAGNOSIS — I48.0 PAROXYSMAL ATRIAL FIBRILLATION (HCC): ICD-10-CM

## 2021-06-07 DIAGNOSIS — I50.9 CHRONIC CONGESTIVE HEART FAILURE, UNSPECIFIED HEART FAILURE TYPE (HCC): ICD-10-CM

## 2021-06-07 DIAGNOSIS — E03.9 ACQUIRED HYPOTHYROIDISM: ICD-10-CM

## 2021-06-07 DIAGNOSIS — E78.2 MIXED HYPERLIPIDEMIA: Primary | ICD-10-CM

## 2021-06-07 PROCEDURE — 99214 OFFICE O/P EST MOD 30 MIN: CPT | Performed by: INTERNAL MEDICINE

## 2021-06-07 RX ORDER — METOLAZONE 5 MG/1
TABLET ORAL
Qty: 30 TABLET | Refills: 0
Start: 2021-06-07 | End: 2022-01-01

## 2021-06-07 NOTE — ASSESSMENT & PLAN NOTE
On pantoprazole and received her first dosing at 5:30 AM suggest changing the dose to 10:30 AM because after taking the pantoprazole and thyroid medications she became short of breath and has PND and cannot lie supine but can fall asleep in a chair.

## 2021-06-07 NOTE — ASSESSMENT & PLAN NOTE
Current rhythm is sinus on Eliquis 2.5 mg twice daily and carvedilol 6.25 mg twice daily continue therapy

## 2021-06-07 NOTE — ASSESSMENT & PLAN NOTE
Hypothyroidism on Synthroid and Cytomel suggest to change the dosing of these medicines to 10:30 AM

## 2021-06-07 NOTE — PROGRESS NOTES
[facility]     Amparo Valdes  2/3/1930   [id number]      Patient Care Team:  Nj Mckeon MD as PCP - General  Nj Mckeon MD as PCP - Family Medicine  Harjeet Martinez MD as Consulting Physician (Interventional Cardiology)  Dwight De Souza MD as Consulting Physician (Urology)    Chief Complaint::   Chief Complaint   Patient presents with   • Congestive Heart Failure     follow up   • Fall     5/7/21 with laceration to knee, right            HPI  Patient is 89-year-old female paroxysmal atrial fibrillation on Eliquis 2.5 mg twice daily and carvedilol 6.25 mg twice daily with current improved congestive heart failure on Lasix of 40 mg daily and as needed metolazone 5 mg for weight gain with current gio weight of 128 pounds on O2 2 L/day she recently had a fall from her bed with an abrasion of her right wrist and right forearm and a small abrasion laceration x2 of the right leg just below the knee.  No evidence of fracture or dislocation.  Patient does complain of being awakened at 530 to take her Cytomel and Synthroid and pantoprazole and following the doses of these medicines the patient becomes mildly short of breath with PND and is unable to lie flat despite O2.  She is able to sit in a chair and fall asleep until 8:00.  Denies headache or dizzy denies cough or wheeze is mildly short of breath on O2 and current medications denies nausea vomiting complains of some mild soreness of the right arm and right knee      Patient Active Problem List   Diagnosis   • Irritable bowel syndrome with constipation and diarrhea   • Chronic cystitis   • Benign essential hypertension   • ASHD (arteriosclerotic heart disease)   • Moderate COPD (chronic obstructive pulmonary disease) (CMS/[facility])   • Acquired hypothyroidism   • Other specified anemias   • Primary osteoarthritis involving multiple joints   • Advanced age   • Ischemic heart disease   • Gastroesophageal reflux disease without esophagitis   •  Arteriosclerosis of coronary artery bypass graft   • Asthenia   • Chronic UTI   • CHF (congestive heart failure) (CMS/HCC)   • Femoral hernia   • First degree atrioventricular block   • Heart disorder   • Herpes zoster   • Hyponatremia   • Iron deficiency anemia   • Low blood pressure   • Mixed hyperlipidemia   • Osteoarthritis   • Parasomnia   • Urethritis   • Paroxysmal atrial fibrillation (CMS/HCC)   • Skin infection   • Acute on chronic respiratory failure with hypoxia (CMS/HCC)   • Lumbar pain   • Medicare annual wellness visit, subsequent        Past Medical History:   Diagnosis Date   • Arthritis    • Cancer, skin, squamous cell 12/27/2012   • Colon polyps 1987    clear 1994   • COPD (chronic obstructive pulmonary disease) (CMS/HCC)    • Family history of coronary artery disease    • Family history of hyperlipidemia    • Family history of hypertension    • Family history of stroke    • Femoral hernia of right side 12/13/2011   • Hyperlipidemia    • Hypertension    • Hypothyroidism    • Osteoarthritis    • Osteoporosis    • Pneumonia    • Rectal fistula     repair   • SBO (small bowel obstruction) (CMS/HCC) 09/13/2012    Had right inguinal hernia repair   • Sepsis (CMS/HCC) 04/2014    - Drug therapy    • Stress incontinence     A and P  repair   • Thyroid disease        Past Surgical History:   Procedure Laterality Date   • ANAL FISTULA REPAIR     • CARDIAC SURGERY  10/2011    stents placed   • CATARACT EXTRACTION     • CORONARY ANGIOPLASTY WITH STENT PLACEMENT  11/2011    x3   • HYSTERECTOMY     • INGUINAL HERNIA REPAIR     • OTHER SURGICAL HISTORY      thyroid uptake   • VAGINAL VAULT SUSPENSION         Family History   Problem Relation Age of Onset   • Stroke Mother    • Heart disease Father    • Hypertension Brother    • Diabetes Brother    • Diabetes Daughter    • Heart disease Daughter    • Hypertension Son    • Hypertension Other    • Coronary artery disease Other    • Stroke Other    • Hyperlipidemia  Other    • Other Other         ichthyosis       Social History     Socioeconomic History   • Marital status:      Spouse name: Not on file   • Number of children: Not on file   • Years of education: Not on file   • Highest education level: Not on file   Tobacco Use   • Smoking status: Former Smoker   • Smokeless tobacco: Former User   • Tobacco comment: quit at approx age 60   Substance and Sexual Activity   • Alcohol use: Yes     Comment: rarely   • Drug use: No   • Sexual activity: Defer       Allergies   Allergen Reactions   • Acetaminophen Unknown (See Comments)     Darvocet- N100   • Baycol [Cerivastatin] Unknown (See Comments)     unknown   • Ciprofloxacin Unknown (See Comments)     Unknown   • Ciprofloxacin Hcl Unknown (See Comments)     unknown   • Codeine Unknown (See Comments)     unknown   • Fosamax [Alendronate Sodium] Unknown (See Comments)     unknown   • Meperidine Unknown (See Comments)     unknown   • Methadone Unknown (See Comments)     unknown   • Morphine Unknown (See Comments)     unknown   • Naloxone Unknown (See Comments)     unknown   • Propoxyphene Unknown (See Comments)     darvocet-n 100   • Sulfa Antibiotics Unknown (See Comments)     unknown   • Ubidecarenone GI Intolerance   • Zocor [Simvastatin] Unknown (See Comments)     unknown       Review of Systems     HEENT: Denies headache or dizzy  NECK: Denies pain or stiffness  CHEST: Complains of mild shortness of breath on O2 2 L a minute no recent pulmonary infections  CARDIAC: Denies chest pain or pressure history of coronary disease and congestive heart failure currently improved but with PND after awakening at 5:30 in the morning for her medications  ABD: Denies nausea vomiting  : Denies dysuria frequency  NEURO: Denies syncope concussion  PSYCH: Denies anxiety depression  EXTREM: Complains of right arm soreness and bruising in right knee small laceration from recent fall    Vital Signs  Vitals:    06/07/21 1428   BP: 122/70  "  BP Location: Right arm   Patient Position: Sitting   Cuff Size: Adult   Pulse: 68   Temp: 98.7 °F (37.1 °C)   Weight: 58.1 kg (128 lb)   Height: 165.1 cm (65\")   PainSc: 0-No pain     Body mass index is 21.3 kg/m².  Patient's Body mass index is 21.3 kg/m². indicating that she is within normal range (BMI 18.5-24.9). No BMI management plan needed..     Advance Care Planning   ACP discussion was held with the patient during this visit. Patient has an advance directive in EMR which is still valid.        Current Outpatient Medications:   •  apixaban (ELIQUIS) 2.5 MG tablet tablet, 1/2 tablet BID, Disp: , Rfl:   •  atorvastatin (LIPITOR) 40 MG tablet, Take 1 tablet by mouth Daily., Disp: 90 tablet, Rfl: 3  •  calcium carbonate-vitamin d (CALTRATE 600+D) 600-400 MG-UNIT per tablet, Take 1 tablet by mouth Daily., Disp: , Rfl:   •  carvedilol (Coreg) 6.25 MG tablet, Take 1 tablet by mouth 2 (Two) Times a Day With Meals., Disp: 180 tablet, Rfl: 3  •  furosemide (LASIX) 40 MG tablet, TAKE 1 TABLET BY MOUTH EVERY DAY, Disp: 90 tablet, Rfl: 3  •  gabapentin (NEURONTIN) 300 MG capsule, TAKE 1 CAPSULE BY MOUTH TWICE A DAY, Disp: 180 capsule, Rfl: 0  •  KLOR-CON 10 MEQ CR tablet, Take 10 mEq by mouth Daily., Disp: , Rfl:   •  levothyroxine (SYNTHROID, LEVOTHROID) 50 MCG tablet, Note chg dose to 50 mcg from 75 (Patient taking differently: Take 50 mcg by mouth Daily. Note chg dose to 50 mcg from 75), Disp: 90 tablet, Rfl: 3  •  liothyronine (CYTOMEL) 5 MCG tablet, TAKE 1 TABLET BY MOUTH EVERY DAY, Disp: 90 tablet, Rfl: 1  •  Multiple Vitamins-Minerals (CENTRUM SILVER PO), Take 1 tablet by mouth Daily., Disp: , Rfl:   •  O2 (OXYGEN), Inhale 2 L/min Every Night. Takes 3 l every night, Disp: , Rfl:   •  pantoprazole (PROTONIX) 40 MG EC tablet, Take 1 tablet by mouth Daily., Disp: 90 tablet, Rfl: 3  •  Propylene Glycol (Systane Complete) 0.6 % solution, Apply 1 drop to eye(s) as directed by provider Daily As Needed., Disp: , Rfl:   •  " trimethoprim (TRIMPEX) 100 MG tablet, Take 1 tablet by mouth Daily., Disp: 90 tablet, Rfl: 3  •  metOLazone (ZAROXOLYN) 5 MG tablet, One as needed for 5 pound gain, Disp: 30 tablet, Rfl: 0    Physical Exam     ACE III MINI         HEENT: No facial asymmetry pharynx is clear  NECK: No mass or bruit  CHEST: Clear  CARDIAC:: Regular rhythm without gallop or rub blood pressure heart rate stable  ABD: Liver and spleen normal good bowel sounds  : Deferred  NEURO: Intact  PSYCH: Normal  EXTREM: Mild bruising right wrist and right forearm and mild abrasion just below the right knee  Skin: Skin abrasion right knee and bruising right forearm and wrist     Results Review:    No results found for this or any previous visit (from the past 672 hour(s)).  Procedures    Medication Review: Medications reviewed and noted    Patient wellness counseling  Exercise: Encouraged continued ambulation with walker and assistance  Diet: Healthy cardiac diet  Smoking: Non-smoker  Alcohol: None alcohol  Screening:    Assessment/Plan:    Problem List Items Addressed This Visit        Cardiac and Vasculature    CHF (congestive heart failure) (CMS/[facility])    Overview     History of chronic heart failure improved on carvedilol Eliquis Lipitor Lasix and O2         Current Assessment & Plan       History of congestive heart failure with current blood pressure 122/70 and current weight of 148 pounds with no edema and minimal shortness of breath on chronic O2 at 2 L/min the patient takes Lasix 40 mg daily and metolazone 5 mg as needed if she gains weight and is currently maintaining compensation.  The patient does state that every morning at 530 she is awake and per her caregivers and is given pantoprazole and her 2 thyroid medications of Synthroid and Cytomel and developed some shortness of breath.  The patient is awakened at 530 she is unable to rest supine and she either sits up or sits in a chair and can fall asleep.  The patient goes to sleep at  approximately 12 midnight, I suggest she change the Cytomel Synthroid and pantoprazole to 1030 every morning so that she can sleep soundly until 8 AM and then arise after breakfast and regular morning medications but change the 3 medicines of Cytomel Synthroid omeprazole to 1030.         Relevant Medications    carvedilol (Coreg) 6.25 MG tablet    Mixed hyperlipidemia - Primary    Overview     History of mixed hyperlipidemia on atorvastatin 40 mg daily denies myalgia arthralgia         Current Assessment & Plan       History of essential hypertension coronary disease and congestive heart failure on atorvastatin 40 mg daily continue therapy         Relevant Medications    atorvastatin (LIPITOR) 40 MG tablet    Paroxysmal atrial fibrillation (CMS/HCC)    Overview     Atrial fibrillation noted on EKG symptomatic on May 20, 2019 refer to cardiology underwent cardioversion is on Eliquis 5 mg twice daily Coreg 6.25 twice daily with current heart rate of 60/min         Current Assessment & Plan     Current rhythm is sinus on Eliquis 2.5 mg twice daily and carvedilol 6.25 mg twice daily continue therapy         Relevant Medications    carvedilol (Coreg) 6.25 MG tablet       Endocrine and Metabolic    Acquired hypothyroidism    Overview     History of hypothyroidism on Synthroid 75 MCG daily patient's TSH is low will reduce the Synthroid to 50 MCG daily and continue the Cytomel at 5 mg daily         Current Assessment & Plan     Hypothyroidism on Synthroid and Cytomel suggest to change the dosing of these medicines to 10:30 AM         Relevant Medications    carvedilol (Coreg) 6.25 MG tablet    levothyroxine (SYNTHROID, LEVOTHROID) 50 MCG tablet    liothyronine (CYTOMEL) 5 MCG tablet       Gastrointestinal Abdominal     Gastroesophageal reflux disease without esophagitis    Overview     History of GERD and currently stable on Protonix 40 mg daily denies reflux abdominal pain         Current Assessment & Plan     On  pantoprazole and received her first dosing at 5:30 AM suggest changing the dose to 10:30 AM because after taking the pantoprazole and thyroid medications she became short of breath and has PND and cannot lie supine but can fall asleep in a chair.         Relevant Medications    pantoprazole (PROTONIX) 40 MG EC tablet           Patient Instructions   Continue all current medications  Suggest not awakening the patient at 5:30 in the morning with her thyroid medications and pantoprazole but let the patient sleep until 8:00  Give the Synthroid Cytomel and pantoprazole at 10:30 AM  Encouraged ambulation with a walker  Continue O2 at 2 L a minute  Return visit as scheduled July 22       Plan of care reviewed with patient at the conclusion of today's visit. Education was provided regarding diagnosis, management, and any prescribed or recommended OTC medications.Patient verbalizes understanding of and agreement with management plan.         Nj Mckeon MD      Note: Part of this note may be an electronic transcription/translation of spoken language to printed text using the Dragon Dictation system.

## 2021-06-07 NOTE — PATIENT INSTRUCTIONS
Continue all current medications  Suggest not awakening the patient at 5:30 in the morning with her thyroid medications and pantoprazole but let the patient sleep until 8:00  Give the Synthroid Cytomel and pantoprazole at 10:30 AM  Encouraged ambulation with a walker  Continue O2 at 2 L a minute  Return visit as scheduled July 22

## 2021-06-07 NOTE — ASSESSMENT & PLAN NOTE
History of essential hypertension coronary disease and congestive heart failure on atorvastatin 40 mg daily continue therapy

## 2021-06-07 NOTE — ASSESSMENT & PLAN NOTE
History of congestive heart failure with current blood pressure 122/70 and current weight of 148 pounds with no edema and minimal shortness of breath on chronic O2 at 2 L/min the patient takes Lasix 40 mg daily and metolazone 5 mg as needed if she gains weight and is currently maintaining compensation.  The patient does state that every morning at 530 she is awake and per her caregivers and is given pantoprazole and her 2 thyroid medications of Synthroid and Cytomel and developed some shortness of breath.  The patient is awakened at 530 she is unable to rest supine and she either sits up or sits in a chair and can fall asleep.  The patient goes to sleep at approximately 12 midnight, I suggest she change the Cytomel Synthroid and pantoprazole to 1030 every morning so that she can sleep soundly until 8 AM and then arise after breakfast and regular morning medications but change the 3 medicines of Cytomel Synthroid omeprazole to 1030.

## 2021-07-22 NOTE — ASSESSMENT & PLAN NOTE
Essential hypertension with current blood pressure 110/70 repeated 112/70 on Lasix 40 mg daily and metolazone 5 mg as needed, carvedilol 6.25 mg twice daily continue therapy

## 2021-07-22 NOTE — ASSESSMENT & PLAN NOTE
Remote history of atrial fibrillation currently in sinus rhythm on Eliquis 2.5 twice daily carvedilol 6.25 twice daily, and Lasix 40 mg daily with as needed Zaroxolyn 5 mg for weight gain of 5 pounds.

## 2021-07-22 NOTE — ASSESSMENT & PLAN NOTE
Markedly improved heart failure with weight down 124 pounds and blood pressure 110/70 with heart rate of 60 continue carvedilol 6.25 twice daily Lasix 40 mg daily and metolazone 5 mg for weight gain of 5 pounds the patient's lungs are clear except for a few base crackles cardiac is stable without gallop or rub continue therapy

## 2021-07-22 NOTE — PATIENT INSTRUCTIONS
Continue current medications including oxygen 2 L 24/7 and as needed Zaroxolyn 5 mg for weight gain of 5 pounds  Encourage ambulation with Rollator walker  Healthy cardiac low-salt diet  Return visit in 4 months or as needed

## 2021-07-22 NOTE — PROGRESS NOTES
[facility]     Amparo Valdes  2/3/1930   [id number]      Patient Care Team:  Nj Mckeon MD as PCP - General  Nj Mckeon MD as PCP - Family Medicine  Harjeet Martinez MD as Consulting Physician (Interventional Cardiology)  Dwight De Souza MD as Consulting Physician (Urology)    Chief Complaint::   Chief Complaint   Patient presents with   • Hyperlipidemia     follow up   • Congestive Heart Failure     follow up   • Med Refill     gabapentin            HPI  Patient is 89-year-old female with history of coronary disease hypertension and chronic heart failure with associated COPD and hypoxia with progressive improvement in heart failure with weight 224 pounds on Lasix of 40 mg daily Zaroxolyn 5 mg as needed weight gain of 5 pounds O2 2 L a minute 24/7.  Patient states she does feel improved is resting well has a reasonable appetite is fairly active and continues all of her medication without headache or dizzy dysphagia cough or wheeze chest pain or palpitations nausea vomiting and denies edema at this time.      Patient Active Problem List   Diagnosis   • Irritable bowel syndrome with constipation and diarrhea   • Chronic cystitis   • Benign essential hypertension   • ASHD (arteriosclerotic heart disease)   • Moderate COPD (chronic obstructive pulmonary disease) (CMS/[facility])   • Acquired hypothyroidism   • Other specified anemias   • Primary osteoarthritis involving multiple joints   • Advanced age   • Ischemic heart disease   • Gastroesophageal reflux disease without esophagitis   • Arteriosclerosis of coronary artery bypass graft   • Asthenia   • Chronic UTI   • CHF (congestive heart failure) (CMS/HCC)   • Femoral hernia   • First degree atrioventricular block   • Heart disorder   • Herpes zoster   • Hyponatremia   • Iron deficiency anemia   • Low blood pressure   • Mixed hyperlipidemia   • Osteoarthritis   • Parasomnia   • Urethritis   • Paroxysmal atrial fibrillation (CMS/[facility])   • Skin  infection   • Acute on chronic respiratory failure with hypoxia (CMS/HCC)   • Lumbar pain   • Medicare annual wellness visit, subsequent        Past Medical History:   Diagnosis Date   • Arthritis    • Cancer, skin, squamous cell 12/27/2012   • Colon polyps 1987    clear 1994   • COPD (chronic obstructive pulmonary disease) (CMS/HCC)    • Family history of coronary artery disease    • Family history of hyperlipidemia    • Family history of hypertension    • Family history of stroke    • Femoral hernia of right side 12/13/2011   • Hyperlipidemia    • Hypertension    • Hypothyroidism    • Osteoarthritis    • Osteoporosis    • Pneumonia    • Rectal fistula     repair   • SBO (small bowel obstruction) (CMS/HCC) 09/13/2012    Had right inguinal hernia repair   • Sepsis (CMS/HCC) 04/2014    - Drug therapy    • Stress incontinence     A and P  repair   • Thyroid disease        Past Surgical History:   Procedure Laterality Date   • ANAL FISTULA REPAIR     • CARDIAC SURGERY  10/2011    stents placed   • CATARACT EXTRACTION     • CORONARY ANGIOPLASTY WITH STENT PLACEMENT  11/2011    x3   • HYSTERECTOMY     • INGUINAL HERNIA REPAIR     • OTHER SURGICAL HISTORY      thyroid uptake   • VAGINAL VAULT SUSPENSION         Family History   Problem Relation Age of Onset   • Stroke Mother    • Heart disease Father    • Hypertension Brother    • Diabetes Brother    • Diabetes Daughter    • Heart disease Daughter    • Hypertension Son    • Hypertension Other    • Coronary artery disease Other    • Stroke Other    • Hyperlipidemia Other    • Other Other         ichthyosis       Social History     Socioeconomic History   • Marital status:      Spouse name: Not on file   • Number of children: Not on file   • Years of education: Not on file   • Highest education level: Not on file   Tobacco Use   • Smoking status: Former Smoker   • Smokeless tobacco: Former User   • Tobacco comment: quit at approx age 60   Substance and Sexual  "Activity   • Alcohol use: Yes     Comment: rarely   • Drug use: No   • Sexual activity: Defer       Allergies   Allergen Reactions   • Acetaminophen Unknown (See Comments)     Darvocet- N100   • Baycol [Cerivastatin] Unknown (See Comments)     unknown   • Ciprofloxacin Unknown (See Comments)     Unknown   • Ciprofloxacin Hcl Unknown (See Comments)     unknown   • Codeine Unknown (See Comments)     unknown   • Fosamax [Alendronate Sodium] Unknown (See Comments)     unknown   • Meperidine Unknown (See Comments)     unknown   • Methadone Unknown (See Comments)     unknown   • Morphine Unknown (See Comments)     unknown   • Naloxone Unknown (See Comments)     unknown   • Propoxyphene Unknown (See Comments)     darvocet-n 100   • Sulfa Antibiotics Unknown (See Comments)     unknown   • Ubidecarenone GI Intolerance   • Zocor [Simvastatin] Unknown (See Comments)     unknown       Review of Systems     HEENT: Denies headache or dizzy  NECK: Denies dysphagia  CHEST: Denies cough or wheeze is chronically short of breath improved with O2 at 2 L a minute and improved with diuresis and resolution of pleural fluid  CARDIAC: Denies chest pain or pressure or palpitations but history of atrial fib currently in sinus on therapy and improved with less shortness of breath and no chest pain  ABD: Denies nausea vomiting  : Denies dysuria frequency  NEURO: Denies syncope concussion  PSYCH: Denies anxiety depression  EXTREM: Mild arthritic change with no edema    Vital Signs  Vitals:    07/22/21 1440   BP: 110/70   BP Location: Right arm   Patient Position: Sitting   Cuff Size: Adult   Pulse: 60   Temp: 98.4 °F (36.9 °C)   Weight: 56.5 kg (124 lb 9.6 oz)   Height: 165.1 cm (65\")   PainSc: 0-No pain     Body mass index is 20.73 kg/m².  Patient's Body mass index is 20.73 kg/m². indicating that she is within normal range (BMI 18.5-24.9). No BMI management plan needed..     Advance Care Planning   ACP discussion was held with the patient " during this visit. Patient has an advance directive in EMR which is still valid.        Current Outpatient Medications:   •  apixaban (ELIQUIS) 2.5 MG tablet tablet, 1/2 tablet BID, Disp: , Rfl:   •  atorvastatin (LIPITOR) 40 MG tablet, TAKE 1 TABLET BY MOUTH EVERY DAY, Disp: 90 tablet, Rfl: 2  •  calcium carbonate-vitamin d (CALTRATE 600+D) 600-400 MG-UNIT per tablet, Take 1 tablet by mouth Daily., Disp: , Rfl:   •  carvedilol (Coreg) 6.25 MG tablet, Take 1 tablet by mouth 2 (Two) Times a Day With Meals., Disp: 180 tablet, Rfl: 3  •  furosemide (LASIX) 40 MG tablet, TAKE 1 TABLET BY MOUTH EVERY DAY, Disp: 90 tablet, Rfl: 3  •  KLOR-CON 10 MEQ CR tablet, Take 10 mEq by mouth Daily., Disp: , Rfl:   •  levothyroxine (SYNTHROID, LEVOTHROID) 50 MCG tablet, Note chg dose to 50 mcg from 75 (Patient taking differently: Take 50 mcg by mouth Daily. Note chg dose to 50 mcg from 75), Disp: 90 tablet, Rfl: 3  •  liothyronine (CYTOMEL) 5 MCG tablet, TAKE 1 TABLET BY MOUTH EVERY DAY, Disp: 90 tablet, Rfl: 1  •  metOLazone (ZAROXOLYN) 5 MG tablet, One as needed for 5 pound gain, Disp: 30 tablet, Rfl: 0  •  Multiple Vitamins-Minerals (CENTRUM SILVER PO), Take 1 tablet by mouth Daily., Disp: , Rfl:   •  O2 (OXYGEN), Inhale 2 L/min Every Night. Takes 3 l every night, Disp: , Rfl:   •  pantoprazole (PROTONIX) 40 MG EC tablet, Take 1 tablet by mouth Daily., Disp: 90 tablet, Rfl: 3  •  Propylene Glycol (Systane Complete) 0.6 % solution, Apply 1 drop to eye(s) as directed by provider Daily As Needed., Disp: , Rfl:   •  trimethoprim (TRIMPEX) 100 MG tablet, Take 1 tablet by mouth Daily., Disp: 90 tablet, Rfl: 3  •  gabapentin (NEURONTIN) 300 MG capsule, TAKE 1 CAPSULE BY MOUTH TWICE A DAY, Disp: 180 capsule, Rfl: 0    Physical Exam     ACE III MINI         HEENT: No asymmetry pharynx is clear  NECK: No mass bruit or thyromegaly or neck vein distention  CHEST: Distant breath sounds with few base crackles without rales or wheezing and no  dullness  CARDIAC: Regular without gallop or rub blood pressure heart rate stable  ABD: Liver spleen normal good bowel sounds  : Deferred  NEURO: Intact  PSYCH: Normal  EXTREM: No edema mild arthritic change  Skin: Clear     Results Review:    No results found for this or any previous visit (from the past 672 hour(s)).  Procedures    Medication Review: Medications reviewed and noted    Patient wellness counseling  Exercise: Encouraged ambulation with Rollator  Diet: Healthy cardiac diet  Smoking: Non-smoker  Alcohol: None alcohol  Screening:    Assessment/Plan:    Problem List Items Addressed This Visit        Cardiac and Vasculature    Benign essential hypertension    Overview     History of essential hypertension with current blood pressure 120/70 on carvedilol 6.25 twice daily Lasix 20 mg daily blood pressure is in the low normal range the patient is asymptomatic denying headache dizzy or lightheadedness and no falls we will continue current therapy.         Current Assessment & Plan       Essential hypertension with current blood pressure 110/70 repeated 112/70 on Lasix 40 mg daily and metolazone 5 mg as needed, carvedilol 6.25 mg twice daily continue therapy         Relevant Medications    carvedilol (Coreg) 6.25 MG tablet    furosemide (LASIX) 40 MG tablet    metOLazone (ZAROXOLYN) 5 MG tablet    CHF (congestive heart failure) (CMS/HCC)    Overview     History of chronic heart failure improved on carvedilol Eliquis Lipitor Lasix and O2         Current Assessment & Plan       Markedly improved heart failure with weight down 124 pounds and blood pressure 110/70 with heart rate of 60 continue carvedilol 6.25 twice daily Lasix 40 mg daily and metolazone 5 mg for weight gain of 5 pounds the patient's lungs are clear except for a few base crackles cardiac is stable without gallop or rub continue therapy         Relevant Medications    carvedilol (Coreg) 6.25 MG tablet    Mixed hyperlipidemia - Primary    Overview      History of mixed hyperlipidemia on atorvastatin 40 mg daily denies myalgia arthralgia         Current Assessment & Plan       Mixed hyperlipidemia on atorvastatin 40 mg daily continue therapy         Relevant Medications    atorvastatin (LIPITOR) 40 MG tablet    Paroxysmal atrial fibrillation (CMS/[facility])    Overview     Atrial fibrillation noted on EKG symptomatic on May 20, 2019 refer to cardiology underwent cardioversion is on Eliquis 5 mg twice daily Coreg 6.25 twice daily with current heart rate of 60/min         Current Assessment & Plan     Remote history of atrial fibrillation currently in sinus rhythm on Eliquis 2.5 twice daily carvedilol 6.25 twice daily, and Lasix 40 mg daily with as needed Zaroxolyn 5 mg for weight gain of 5 pounds.         Relevant Medications    carvedilol (Coreg) 6.25 MG tablet       Endocrine and Metabolic    Acquired hypothyroidism    Overview     History of hypothyroidism on Synthroid 75 MCG daily patient's TSH is low will reduce the Synthroid to 50 MCG daily and continue the Cytomel at 5 mg daily         Current Assessment & Plan     Hypothyroidism on levothyroxine 50 MCG daily and Cytomel 5 MCG daily continue therapy         Relevant Medications    carvedilol (Coreg) 6.25 MG tablet    levothyroxine (SYNTHROID, LEVOTHROID) 50 MCG tablet    liothyronine (CYTOMEL) 5 MCG tablet       Pulmonary and Pneumonias    Moderate COPD (chronic obstructive pulmonary disease) (CMS/[facility])    Overview     History of mild COPD on O2 at night         Current Assessment & Plan       Mild COPD and coronary disease with chronic heart failure on O2 2 L 24/7 continue therapy                  Patient Instructions   Continue current medications including oxygen 2 L 24/7 and as needed Zaroxolyn 5 mg for weight gain of 5 pounds  Encourage ambulation with Rollator walker  Healthy cardiac low-salt diet  Return visit in 4 months or as needed       Plan of care reviewed with patient at the conclusion of today's  visit. Education was provided regarding diagnosis, management, and any prescribed or recommended OTC medications.Patient verbalizes understanding of and agreement with management plan.         Nj Mckeon MD      Note: Part of this note may be an electronic transcription/translation of spoken language to printed text using the Dragon Dictation system.

## 2021-10-18 PROBLEM — U07.1 COVID-19 VIRUS INFECTION: Status: ACTIVE | Noted: 2021-01-01

## 2021-10-21 NOTE — OUTREACH NOTE
Prep Survey      Responses   [facility] facility patient discharged from? Non-[facility]   Is LACE score < 7 ? Non-BH Discharge   Emergency Room discharge w/ pulse ox? No   Eligibility TCM [facility]   Date of Discharge 10/21/21   Discharge Disposition Home or Self Care   Discharge diagnosis Unavailable   Does the patient have one of the following disease processes/diagnoses(primary or secondary)? Other   Prep survey completed? Yes          Alexandria Green RN

## 2021-10-22 NOTE — OUTREACH NOTE
Call Center TCM Note      Responses   [facility] patient discharged from? Non-  [[facility] ]   Does the patient have one of the following disease processes/diagnoses(primary or secondary)? Other   TCM attempt successful? Yes   Call start time 1057   Call end time 1116   Discharge diagnosis COVID  , UTI   Is patient permission given to speak with other caregiver? Yes   List who call center can speak with Alyson Valdes, daughter   Person spoke with today (if not patient) and relationship patient and daughter   Meds reviewed with patient/caregiver? Yes  [daughter states patient came home on antibiotic for the UTI. ]   Does the patient have all medications ordered at discharge? Yes   Is the patient taking all medications as directed (includes completed medication regime)? Yes   Medication comments Reports has all needed meds at this time. No medication questions.    Does the patient have a primary care provider?  Yes   Does the patient have an appointment with their PCP within 7 days of discharge? Yes   Comments regarding PCP PCP Dr Nj Mckeon. My Chart Video visit scheduled for Monday 10/25/21  215pm   Has the patient kept scheduled appointments due by today? N/A   Comments Telehealth appt arranged as patient is on home quarantine.    Has home health visited the patient within 72 hours of discharge? N/A   DME comments Wears continous home O2.    Psychosocial issues? No   Psychosocial comments Patient reports that she cannot return to facility and had to come home to family due to being on quarantine from COVID until 11/2/21.    Did the patient receive a copy of their discharge instructions? Yes   What is the patient's perception of their health status since discharge? Improving  [Daughter reports patient doesn't have much of a cough. Breathing is ok and wearing home O2. ]   Is the patient/caregiver able to teach back signs and symptoms related to disease process for when to call PCP? Yes   Is  the patient/caregiver able to teach back signs and symptoms related to disease process for when to call 911? Yes   Is the patient/caregiver able to teach back the hierarchy of who to call/visit for symptoms/problems? PCP, Specialist, Home health nurse, Urgent Care, ED, 911 Yes   Additional teach back comments Monitor home O2 sat with pulse ox. Advised to seek ER if O2 sat remains below 90% with rest, doing deep breathing exercises and wearing O2.    TCM call completed? Yes   Wrap up additional comments Denies further questions or needs today.           Roxana Potts RN    10/22/2021, 11:16 EDT

## 2021-10-25 NOTE — PROGRESS NOTES
[facility]     Amparo Valdes  2/3/1930   [id number]      Patient Care Team:  Nj Mckeon MD as PCP - General  Nj Mckeon MD as PCP - Family Medicine  Harjeet Martinez MD as Consulting Physician (Interventional Cardiology)  Dwight De Souza MD as Consulting Physician (Urology)    Chief Complaint:: No chief complaint on file.     You have chosen to receive care through a telephone visit. Do you consent to use a telephone visit for your medical care today? Yes  This visit has been rescheduled as a phone visit to comply with patient safety concerns in accordance with CDC recommendations. Total time of discussion was 15 minutes.    This is a telephone visit    HPI  This is a telephone visit with his 89-year-old female who was recently hospitalized for Covid pneumonia at [facility] October 16 through October 21 she was discharged on doxycycline and is improved she continues her oxygen 2 L 24/7 she states she has been on quarantine for the Covid which resolves on October 26 she would be able to return to her extended care facility on Wednesday the 27th and she will be completely quarantine free by November 1.  The patient states she feels well denying headache or dizzy denying dysphagia denying cough or wheeze denying chest pain or pressure has a good appetite complains of generalized weakness which she attributes to her age of 89 years and chronic hypoxia which is resolved on O2 at 2 L a minute.      Patient Active Problem List   Diagnosis   • Irritable bowel syndrome with constipation and diarrhea   • Chronic cystitis   • Benign essential hypertension   • ASHD (arteriosclerotic heart disease)   • Moderate COPD (chronic obstructive pulmonary disease) ([facility])   • Acquired hypothyroidism   • Other specified anemias   • Primary osteoarthritis involving multiple joints   • Advanced age   • Ischemic heart disease   • Gastroesophageal reflux disease without esophagitis   • Arteriosclerosis of coronary  artery bypass graft   • Asthenia   • Chronic UTI   • CHF (congestive heart failure) ([facility])   • Femoral hernia   • First degree atrioventricular block   • Heart disorder   • Herpes zoster   • Hyponatremia   • Iron deficiency anemia   • Low blood pressure   • Mixed hyperlipidemia   • Osteoarthritis   • Parasomnia   • Urethritis   • Paroxysmal atrial fibrillation (HCC)   • Skin infection   • Acute on chronic respiratory failure with hypoxia ([facility])   • Lumbar pain   • Medicare annual wellness visit, subsequent   • COVID-19 virus infection        Past Medical History:   Diagnosis Date   • Arthritis    • Cancer, skin, squamous cell 12/27/2012   • Colon polyps 1987    clear 1994   • COPD (chronic obstructive pulmonary disease) ([facility])    • Family history of coronary artery disease    • Family history of hyperlipidemia    • Family history of hypertension    • Family history of stroke    • Femoral hernia of right side 12/13/2011   • Hyperlipidemia    • Hypertension    • Hypothyroidism    • Osteoarthritis    • Osteoporosis    • Pneumonia    • Rectal fistula     repair   • SBO (small bowel obstruction) ([facility]) 09/13/2012    Had right inguinal hernia repair   • Sepsis ([facility]) 04/2014    - Drug therapy    • Stress incontinence     A and P  repair   • Thyroid disease        Past Surgical History:   Procedure Laterality Date   • ANAL FISTULA REPAIR     • CARDIAC SURGERY  10/2011    stents placed   • CATARACT EXTRACTION     • CORONARY ANGIOPLASTY WITH STENT PLACEMENT  11/2011    x3   • HYSTERECTOMY     • INGUINAL HERNIA REPAIR     • OTHER SURGICAL HISTORY      thyroid uptake   • VAGINAL VAULT SUSPENSION         Family History   Problem Relation Age of Onset   • Stroke Mother    • Heart disease Father    • Hypertension Brother    • Diabetes Brother    • Diabetes Daughter    • Heart disease Daughter    • Hypertension Son    • Hypertension Other    • Coronary artery disease Other    • Stroke Other    • Hyperlipidemia Other    • Other Other          ichthyosis       Social History     Socioeconomic History   • Marital status:    Tobacco Use   • Smoking status: Former Smoker   • Smokeless tobacco: Former User   • Tobacco comment: quit at approx age 60   Substance and Sexual Activity   • Alcohol use: Yes     Comment: rarely   • Drug use: No   • Sexual activity: Defer       Allergies   Allergen Reactions   • Acetaminophen Unknown (See Comments)     Darvocet- N100   • Baycol [Cerivastatin] Unknown (See Comments)     unknown   • Ciprofloxacin Unknown (See Comments)     Unknown   • Ciprofloxacin Hcl Unknown (See Comments)     unknown   • Codeine Unknown (See Comments)     unknown   • Fosamax [Alendronate Sodium] Unknown (See Comments)     unknown   • Meperidine Unknown (See Comments)     unknown   • Methadone Unknown (See Comments)     unknown   • Morphine Unknown (See Comments)     unknown   • Naloxone Unknown (See Comments)     unknown   • Propoxyphene Unknown (See Comments)     darvocet-n 100   • Sulfa Antibiotics Unknown (See Comments)     unknown   • Ubidecarenone GI Intolerance   • Zocor [Simvastatin] Unknown (See Comments)     unknown       Review of Systems     HEENT: Patient denies headache or dizzy  NECK: Denies stiffness or soreness  CHEST: Is chronically short of breath on O2 24/7 with O2 saturations in the mid 90s states that she had pneumonia which is improved on antibiotic  CARDIAC: Denies chest pain or palpitations history of coronary disease currently stable without chest pain or pressure  ABD: Denies nausea vomiting  : Denies dysuria frequency states her urinary tract infection has resolved  NEURO: Denies syncope concussion  PSYCH: Denies anxiety depression  EXTREM: Complains of general extremity weakness    Vital Signs  There were no vitals filed for this visit.  There is no height or weight on file to calculate BMI.  Patient's There is no height or weight on file to calculate BMI. indicating that she is overweight (BMI 25-29.9).  Obesity-related health conditions include the following: coronary heart disease. Obesity is unchanged. BMI is is above average; BMI management plan is completed. We discussed low calorie, low carb based diet program, portion control and increasing exercise..     Advance Care Planning   ACP discussion was held with the patient during this visit. Patient has an advance directive in EMR which is still valid.        Current Outpatient Medications:   •  apixaban (ELIQUIS) 2.5 MG tablet tablet, 1/2 tablet BID, Disp: , Rfl:   •  atorvastatin (LIPITOR) 40 MG tablet, TAKE 1 TABLET BY MOUTH EVERY DAY, Disp: 90 tablet, Rfl: 2  •  calcium carbonate-vitamin d (CALTRATE 600+D) 600-400 MG-UNIT per tablet, Take 1 tablet by mouth Daily., Disp: , Rfl:   •  carvedilol (Coreg) 6.25 MG tablet, Take 1 tablet by mouth 2 (Two) Times a Day With Meals., Disp: 180 tablet, Rfl: 3  •  furosemide (LASIX) 40 MG tablet, TAKE 1 TABLET BY MOUTH EVERY DAY, Disp: 90 tablet, Rfl: 3  •  gabapentin (NEURONTIN) 300 MG capsule, TAKE 1 CAPSULE BY MOUTH TWICE A DAY, Disp: 180 capsule, Rfl: 0  •  KLOR-CON 10 MEQ CR tablet, Take 10 mEq by mouth Daily., Disp: , Rfl:   •  levothyroxine (SYNTHROID, LEVOTHROID) 50 MCG tablet, Note chg dose to 50 mcg from 75 (Patient taking differently: Take 50 mcg by mouth Daily. Note chg dose to 50 mcg from 75), Disp: 90 tablet, Rfl: 3  •  liothyronine (CYTOMEL) 5 MCG tablet, TAKE 1 TABLET BY MOUTH EVERY DAY, Disp: 90 tablet, Rfl: 1  •  metOLazone (ZAROXOLYN) 5 MG tablet, One as needed for 5 pound gain, Disp: 30 tablet, Rfl: 0  •  Multiple Vitamins-Minerals (CENTRUM SILVER PO), Take 1 tablet by mouth Daily., Disp: , Rfl:   •  O2 (OXYGEN), Inhale 2 L/min Every Night. Takes 3 l every night, Disp: , Rfl:   •  pantoprazole (PROTONIX) 40 MG EC tablet, TAKE 1 TABLET BY MOUTH EVERY DAY, Disp: 90 tablet, Rfl: 1  •  Propylene Glycol (Systane Complete) 0.6 % solution, Apply 1 drop to eye(s) as directed by provider Daily As Needed., Disp: ,  Rfl:   •  trimethoprim (TRIMPEX) 100 MG tablet, Take 1 tablet by mouth Daily., Disp: 90 tablet, Rfl: 3    Physical Exam     ACE III MINI    Physical examination was not performed this is a telephone visit but the patient denies facial asymmetry denies neck masses or tenderness denies respiratory distress states her respirations are improved since the Covid infection is improved she is currently on oxygen at 2 L a minute denies abdominal distention and denies extremity changes     HEENT:   NECK:    CHEST:   CARDIAC:   ABD:   :   NEURO:    PSYCH:    EXTREM:   Skin:     Results Review:    No results found for this or any previous visit (from the past 672 hour(s)).  Procedures    Medication Review: Medications reviewed and noted    Patient wellness counseling  Exercise: Encouraged ambulation with assistance  Diet: Healthy cardiac diet  Smoking: Non-smoker  Alcohol: None alcohol  Screening:    Assessment/Plan:    Problem List Items Addressed This Visit        Cardiac and Vasculature    Benign essential hypertension    Overview     History of essential hypertension with current blood pressure 120/70 on carvedilol 6.25 twice daily Lasix 20 mg daily blood pressure is in the low normal range the patient is asymptomatic denying headache dizzy or lightheadedness and no falls we will continue current therapy.         Current Assessment & Plan       Essential hypertension on carvedilol 6.25 mg twice daily Lasix 40 mg daily Zaroxolyn 5 mg as needed patient states her blood pressure is stable and heart rate are stable continue therapy         Relevant Medications    carvedilol (Coreg) 6.25 MG tablet    furosemide (LASIX) 40 MG tablet    metOLazone (ZAROXOLYN) 5 MG tablet    Mixed hyperlipidemia    Overview     History of mixed hyperlipidemia on atorvastatin 40 mg daily denies myalgia arthralgia         Current Assessment & Plan       Essential hypertension coronary artery disease and mixed hyperlipidemia on atorvastatin 40 mg  daily denies myalgia arthralgia continue therapy         Relevant Medications    atorvastatin (LIPITOR) 40 MG tablet       Endocrine and Metabolic    Acquired hypothyroidism    Overview     History of hypothyroidism on Synthroid 75 MCG daily patient's TSH is low will reduce the Synthroid to 50 MCG daily and continue the Cytomel at 5 mg daily         Current Assessment & Plan     Hypothyroidism on Cytomel 5 mg daily and levothyroxine 50 MCG daily continue therapy         Relevant Medications    carvedilol (Coreg) 6.25 MG tablet    levothyroxine (SYNTHROID, LEVOTHROID) 50 MCG tablet    liothyronine (CYTOMEL) 5 MCG tablet       Genitourinary and Reproductive     Chronic UTI    Overview     Recent urinary tract infection with E. coli treated with Macrodantin for 8 days will resume the trimethoprim 100 mg daily for suppression         Current Assessment & Plan     Patient had recurrent urinary tract infection when hospitalized with Covid pulmonary infection this was treated to resolution she is no longer symptomatic and has resumed her prophylactic therapy of Trimpex 100 mg daily            Other    COVID-19 virus infection - Primary    Overview     Patient diagnosed October 16, 2021 with COVID-19 infection and admitted to [facility]         Current Assessment & Plan     Covid pulmonary infection and admitted to [facility] October 16 through October 21 and discharged on antibiotics of doxycycline improved  Has 2 more doses of doxycycline and is continued her oxygen 24/7 2 L                Patient Instructions   Continue current medications and complete therapy with doxycycline  Continue ambulation with walker and healthy cardiac diet  Continue O2 24/7  Obtain the Covid booster after December 16  Return visit as needed as the patient does not want to make a permanent appointment for follow-up in the immediate future she knows she can call for an appointment at a time.       Plan of care reviewed with  patient at the conclusion of today's visit. Education was provided regarding diagnosis, management, and any prescribed or recommended OTC medications.Patient verbalizes understanding of and agreement with management plan.         jN Mckeon MD      Note: Part of this note may be an electronic transcription/translation of spoken language to printed text using the Dragon Dictation system.

## 2021-10-25 NOTE — ASSESSMENT & PLAN NOTE
Covid pulmonary infection and admitted to [facility] October 16 through October 21 and discharged on antibiotics of doxycycline improved  Has 2 more doses of doxycycline and is continued her oxygen 24/7 2 L

## 2021-10-25 NOTE — ASSESSMENT & PLAN NOTE
Essential hypertension coronary artery disease and mixed hyperlipidemia on atorvastatin 40 mg daily denies myalgia arthralgia continue therapy

## 2021-10-25 NOTE — PATIENT INSTRUCTIONS
Continue current medications and complete therapy with doxycycline  Continue ambulation with walker and healthy cardiac diet  Continue O2 24/7  Obtain the Covid booster after December 16  Return visit as needed as the patient does not want to make a permanent appointment for follow-up in the immediate future she knows she can call for an appointment at a time.

## 2021-10-25 NOTE — ASSESSMENT & PLAN NOTE
Patient had recurrent urinary tract infection when hospitalized with Covid pulmonary infection this was treated to resolution she is no longer symptomatic and has resumed her prophylactic therapy of Trimpex 100 mg daily

## 2021-10-25 NOTE — ASSESSMENT & PLAN NOTE
Essential hypertension on carvedilol 6.25 mg twice daily Lasix 40 mg daily Zaroxolyn 5 mg as needed patient states her blood pressure is stable and heart rate are stable continue therapy

## 2021-11-23 NOTE — TELEPHONE ENCOUNTER
I called patient for Eliquis clarification.  She states she is taking a 2.5 mg tablet, one whole tablet twice a day.

## 2021-11-30 NOTE — TELEPHONE ENCOUNTER
Caller: ELADIO RAZO    Relationship: Emergency Contact/Daughter    Caller's Contact Information:    [phone]     What is the best time to reach you:     ANY TIME    Who are you requesting to speak with (clinical staff, provider,  specific staff member):     DR DORSEY OR NURSE    Do you know the name of the person who called:     N/A    What was the call regarding:     CALLER STATED HER MOTHER/PATIENT LIVES IN AN ASSISTED LIVING FACILITY    CALLER STATED THE ASSISTED LIVING IS OFFERING FLU SHOTS FOR RESIDENTS TODAY, 11/30/21, AT 4:00 P.M.    CALLER REQUESTED DR DORSEY OR NURSE RETURN HER CALL ASAP TO LET HER KNOW IF PATIENT SHOULD RECEIVE A FLU SHOT NOW SINCE SHE WAS HOSPITALIZED ON 10/16/21 WITH COVID    CALLER WONDERED IF IT WOULD BE TOO SOON TO RECEIVE A FLU SHOT NOW    PLEASE CONTACT CALLER/DAUGHTER AT TELEPHONE NUMBER INCLUDED ABOVE    PATIENT'S TELEPHONE IS NOT ON BEFORE 9:45 A.M.    Do you require a callback:    YES PLEASE CONTACT CALLER WITH RECOMMENDATION    DR DORSEY

## 2021-12-02 NOTE — TELEPHONE ENCOUNTER
Caller: ELADIO RAZO    Relationship: Emergency Contact    Best call back number: [phone]    Requested Prescriptions:   Requested Prescriptions     Pending Prescriptions Disp Refills   • levothyroxine (SYNTHROID, LEVOTHROID) 50 MCG tablet 90 tablet 3     Sig: Note chg dose to 50 mcg from         Pharmacy where request should be sent: [facility] #[id number] [address] [phone] [facility] [phone] FX     Additional details provided by patient:   PATIENT IS COMPLETLEY OUT OF MEDICATION     Does the patient have less than a 3 day supply:  [x] Yes  [] No    Elle Marinelli Rep   12/02/21 16:29 EST

## 2021-12-29 NOTE — TELEPHONE ENCOUNTER
Caller: ELADIO RAZO    Relationship to patient: Emergency Contact    Best call back number: [phone]- PATIENTS CELL NUMBER     Characteristics of symptom/severity: LEG SWELLING     Where are you experiencing symptoms: RIGHT  LEG SWELLING     How long have you been experiencing symptoms: 5 DAYS     When have you experienced or been treated for these symptoms before: YES    Have you had any recent surgeries, procedures or injections: [] Yes  [x] No   If yes, explain:    Is it the symptoms constant or intermittent: [x] Constant  [] Intermittent     WANTS TO SPEAK WITH DR DORSEY REGARDING THIS       If a prescription is needed, what is your preferred pharmacy:   [id number] [place], [place] - [address] - [phone]  - [phone]   [address]  [facility] [address]  Phone: [phone] Fax: [phone]    [id number] - [place], [place] - [address] - [phone]  - [phone] FX  [address] [address]  Phone: [phone] Fax: [phone]    [id number] - [place], [place] - [address] - [phone] [facility] [phone] FX  [address] [address]  Phone: [phone] Fax: [phone]

## 2021-12-29 NOTE — TELEPHONE ENCOUNTER
Continue with the Lasix of 40 mg daily, but add metolazone 5 mg daily with the Lasix for 5 days.  Please inform patient daughter

## 2021-12-29 NOTE — TELEPHONE ENCOUNTER
"I spoke with patient.  She is c/o bilateral lower extremity edema, but states right leg is much worse than left.  Has swelling \"behind her knees\" and \"maybe some in the abdomen\".  Denies SOB.  Has metolazone 2.5 mg and 5 mg on-hand.  Requesting instructions on best thing to do.    "

## 2022-01-01 ENCOUNTER — OUTSIDE FACILITY SERVICE (OUTPATIENT)
Dept: INTERNAL MEDICINE | Facility: CLINIC | Age: 87
End: 2022-01-01

## 2022-01-01 ENCOUNTER — TELEPHONE (OUTPATIENT)
Dept: INTERNAL MEDICINE | Facility: CLINIC | Age: 87
End: 2022-01-01

## 2022-01-01 ENCOUNTER — OFFICE VISIT (OUTPATIENT)
Dept: INTERNAL MEDICINE | Facility: CLINIC | Age: 87
End: 2022-01-01

## 2022-01-01 ENCOUNTER — TELEPHONE (OUTPATIENT)
Dept: PEDIATRICS | Facility: OTHER | Age: 87
End: 2022-01-01

## 2022-01-01 ENCOUNTER — LAB (OUTPATIENT)
Dept: LAB | Facility: HOSPITAL | Age: 87
End: 2022-01-01

## 2022-01-01 VITALS
DIASTOLIC BLOOD PRESSURE: 78 MMHG | TEMPERATURE: 98.4 F | WEIGHT: 125 LBS | HEIGHT: 65 IN | SYSTOLIC BLOOD PRESSURE: 122 MMHG | BODY MASS INDEX: 20.83 KG/M2 | HEART RATE: 64 BPM

## 2022-01-01 VITALS
SYSTOLIC BLOOD PRESSURE: 112 MMHG | WEIGHT: 137.4 LBS | DIASTOLIC BLOOD PRESSURE: 60 MMHG | BODY MASS INDEX: 22.89 KG/M2 | HEIGHT: 65 IN | HEART RATE: 64 BPM

## 2022-01-01 VITALS
SYSTOLIC BLOOD PRESSURE: 128 MMHG | TEMPERATURE: 98.2 F | BODY MASS INDEX: 21.09 KG/M2 | HEIGHT: 65 IN | DIASTOLIC BLOOD PRESSURE: 80 MMHG | HEART RATE: 68 BPM | WEIGHT: 126.6 LBS | OXYGEN SATURATION: 97 %

## 2022-01-01 VITALS
TEMPERATURE: 97.3 F | DIASTOLIC BLOOD PRESSURE: 72 MMHG | OXYGEN SATURATION: 93 % | HEART RATE: 68 BPM | HEIGHT: 65 IN | BODY MASS INDEX: 24.66 KG/M2 | WEIGHT: 148 LBS | SYSTOLIC BLOOD PRESSURE: 106 MMHG

## 2022-01-01 VITALS
BODY MASS INDEX: 20.99 KG/M2 | WEIGHT: 126 LBS | SYSTOLIC BLOOD PRESSURE: 136 MMHG | TEMPERATURE: 98.4 F | DIASTOLIC BLOOD PRESSURE: 74 MMHG | HEART RATE: 68 BPM | HEIGHT: 65 IN

## 2022-01-01 VITALS
HEIGHT: 65 IN | DIASTOLIC BLOOD PRESSURE: 64 MMHG | WEIGHT: 124 LBS | HEART RATE: 68 BPM | BODY MASS INDEX: 20.66 KG/M2 | SYSTOLIC BLOOD PRESSURE: 110 MMHG | TEMPERATURE: 97.7 F

## 2022-01-01 VITALS
HEIGHT: 65 IN | WEIGHT: 130.6 LBS | TEMPERATURE: 98.2 F | BODY MASS INDEX: 21.76 KG/M2 | DIASTOLIC BLOOD PRESSURE: 60 MMHG | HEART RATE: 74 BPM | OXYGEN SATURATION: 98 % | SYSTOLIC BLOOD PRESSURE: 106 MMHG

## 2022-01-01 VITALS
HEART RATE: 60 BPM | OXYGEN SATURATION: 93 % | DIASTOLIC BLOOD PRESSURE: 84 MMHG | WEIGHT: 137.8 LBS | TEMPERATURE: 98.2 F | SYSTOLIC BLOOD PRESSURE: 136 MMHG | HEIGHT: 65 IN | BODY MASS INDEX: 22.96 KG/M2

## 2022-01-01 VITALS
BODY MASS INDEX: 21.43 KG/M2 | DIASTOLIC BLOOD PRESSURE: 72 MMHG | SYSTOLIC BLOOD PRESSURE: 110 MMHG | TEMPERATURE: 98.2 F | WEIGHT: 128.6 LBS | HEART RATE: 60 BPM | HEIGHT: 65 IN

## 2022-01-01 VITALS
HEART RATE: 80 BPM | DIASTOLIC BLOOD PRESSURE: 50 MMHG | WEIGHT: 140 LBS | BODY MASS INDEX: 23.32 KG/M2 | HEIGHT: 65 IN | SYSTOLIC BLOOD PRESSURE: 80 MMHG

## 2022-01-01 VITALS
BODY MASS INDEX: 21.66 KG/M2 | WEIGHT: 130 LBS | SYSTOLIC BLOOD PRESSURE: 116 MMHG | DIASTOLIC BLOOD PRESSURE: 64 MMHG | HEART RATE: 68 BPM | HEIGHT: 65 IN | TEMPERATURE: 97.1 F

## 2022-01-01 DIAGNOSIS — L03.115 CELLULITIS OF RIGHT LOWER EXTREMITY: Primary | ICD-10-CM

## 2022-01-01 DIAGNOSIS — I10 BENIGN ESSENTIAL HYPERTENSION: ICD-10-CM

## 2022-01-01 DIAGNOSIS — R60.0 LOCALIZED EDEMA: ICD-10-CM

## 2022-01-01 DIAGNOSIS — L03.031 CELLULITIS OF MIDDLE TOE, RIGHT: ICD-10-CM

## 2022-01-01 DIAGNOSIS — I50.9 CHRONIC CONGESTIVE HEART FAILURE, UNSPECIFIED HEART FAILURE TYPE: ICD-10-CM

## 2022-01-01 DIAGNOSIS — I48.0 PAROXYSMAL ATRIAL FIBRILLATION: ICD-10-CM

## 2022-01-01 DIAGNOSIS — G62.9 NEUROPATHY: ICD-10-CM

## 2022-01-01 DIAGNOSIS — L98.9 SKIN LESION OF RIGHT LEG: ICD-10-CM

## 2022-01-01 DIAGNOSIS — I10 BENIGN ESSENTIAL HYPERTENSION: Primary | ICD-10-CM

## 2022-01-01 DIAGNOSIS — E03.9 ACQUIRED HYPOTHYROIDISM: ICD-10-CM

## 2022-01-01 DIAGNOSIS — E78.2 MIXED HYPERLIPIDEMIA: ICD-10-CM

## 2022-01-01 DIAGNOSIS — I50.9 CHRONIC CONGESTIVE HEART FAILURE, UNSPECIFIED HEART FAILURE TYPE: Primary | ICD-10-CM

## 2022-01-01 DIAGNOSIS — I48.19 PERSISTENT ATRIAL FIBRILLATION: ICD-10-CM

## 2022-01-01 DIAGNOSIS — L03.115 CELLULITIS OF RIGHT LOWER LEG: Primary | ICD-10-CM

## 2022-01-01 DIAGNOSIS — I50.9 ACUTE ON CHRONIC CONGESTIVE HEART FAILURE, UNSPECIFIED HEART FAILURE TYPE: Primary | ICD-10-CM

## 2022-01-01 DIAGNOSIS — R60.9 PERIPHERAL EDEMA: ICD-10-CM

## 2022-01-01 DIAGNOSIS — J44.9 MODERATE COPD (CHRONIC OBSTRUCTIVE PULMONARY DISEASE): ICD-10-CM

## 2022-01-01 DIAGNOSIS — R60.0 LOCALIZED EDEMA: Primary | ICD-10-CM

## 2022-01-01 DIAGNOSIS — L03.115 CELLULITIS OF RIGHT LOWER LEG: ICD-10-CM

## 2022-01-01 DIAGNOSIS — R06.02 SHORTNESS OF BREATH: ICD-10-CM

## 2022-01-01 DIAGNOSIS — J96.21 ACUTE ON CHRONIC RESPIRATORY FAILURE WITH HYPOXIA: ICD-10-CM

## 2022-01-01 DIAGNOSIS — I50.9 ACUTE ON CHRONIC CONGESTIVE HEART FAILURE, UNSPECIFIED HEART FAILURE TYPE: ICD-10-CM

## 2022-01-01 DIAGNOSIS — L03.115 CELLULITIS OF RIGHT LOWER EXTREMITY: ICD-10-CM

## 2022-01-01 LAB
ALBUMIN SERPL-MCNC: 3.5 G/DL (ref 3.5–5.2)
ALBUMIN SERPL-MCNC: 3.6 G/DL (ref 3.5–5.2)
ALBUMIN SERPL-MCNC: 3.9 G/DL (ref 3.5–5.2)
ALBUMIN SERPL-MCNC: 4 G/DL (ref 3.5–5.2)
ALBUMIN SERPL-MCNC: 4.4 G/DL (ref 3.5–5.2)
ALBUMIN/GLOB SERPL: 1.2 G/DL
ALBUMIN/GLOB SERPL: 1.4 G/DL
ALBUMIN/GLOB SERPL: 1.4 G/DL
ALBUMIN/GLOB SERPL: 1.5 G/DL
ALBUMIN/GLOB SERPL: 1.5 G/DL
ALP SERPL-CCNC: 104 U/L (ref 39–117)
ALP SERPL-CCNC: 106 U/L (ref 39–117)
ALP SERPL-CCNC: 117 U/L (ref 39–117)
ALP SERPL-CCNC: 117 U/L (ref 39–117)
ALP SERPL-CCNC: 94 U/L (ref 39–117)
ALT SERPL W P-5'-P-CCNC: 23 U/L (ref 1–33)
ALT SERPL W P-5'-P-CCNC: 24 U/L (ref 1–33)
ALT SERPL W P-5'-P-CCNC: 26 U/L (ref 1–33)
ALT SERPL W P-5'-P-CCNC: 26 U/L (ref 1–33)
ALT SERPL W P-5'-P-CCNC: 28 U/L (ref 1–33)
ANION GAP SERPL CALCULATED.3IONS-SCNC: 10.1 MMOL/L (ref 5–15)
ANION GAP SERPL CALCULATED.3IONS-SCNC: 11.1 MMOL/L (ref 5–15)
ANION GAP SERPL CALCULATED.3IONS-SCNC: 13.1 MMOL/L (ref 5–15)
ANION GAP SERPL CALCULATED.3IONS-SCNC: 13.8 MMOL/L (ref 5–15)
ANION GAP SERPL CALCULATED.3IONS-SCNC: 6.2 MMOL/L (ref 5–15)
ANION GAP SERPL CALCULATED.3IONS-SCNC: 9.5 MMOL/L (ref 5–15)
ANISOCYTOSIS BLD QL: ABNORMAL
AST SERPL-CCNC: 31 U/L (ref 1–32)
AST SERPL-CCNC: 33 U/L (ref 1–32)
AST SERPL-CCNC: 33 U/L (ref 1–32)
AST SERPL-CCNC: 35 U/L (ref 1–32)
AST SERPL-CCNC: 39 U/L (ref 1–32)
BASOPHILS # BLD AUTO: 0.04 10*3/MM3 (ref 0–0.2)
BASOPHILS # BLD AUTO: 0.05 10*3/MM3 (ref 0–0.2)
BASOPHILS # BLD MANUAL: 0.12 10*3/MM3 (ref 0–0.2)
BASOPHILS NFR BLD AUTO: 0.6 % (ref 0–1.5)
BASOPHILS NFR BLD AUTO: 0.7 % (ref 0–1.5)
BASOPHILS NFR BLD MANUAL: 2 % (ref 0–1.5)
BILIRUB SERPL-MCNC: 0.8 MG/DL (ref 0–1.2)
BILIRUB SERPL-MCNC: 0.9 MG/DL (ref 0–1.2)
BILIRUB SERPL-MCNC: 0.9 MG/DL (ref 0–1.2)
BUN SERPL-MCNC: 27 MG/DL (ref 8–23)
BUN SERPL-MCNC: 30 MG/DL (ref 8–23)
BUN SERPL-MCNC: 35 MG/DL (ref 8–23)
BUN SERPL-MCNC: 36 MG/DL (ref 8–23)
BUN SERPL-MCNC: 39 MG/DL (ref 8–23)
BUN SERPL-MCNC: 41 MG/DL (ref 8–23)
BUN/CREAT SERPL: 21.5 (ref 7–25)
BUN/CREAT SERPL: 22.2 (ref 7–25)
BUN/CREAT SERPL: 24.5 (ref 7–25)
BUN/CREAT SERPL: 27.1 (ref 7–25)
BUN/CREAT SERPL: 29.3 (ref 7–25)
BUN/CREAT SERPL: 30.2 (ref 7–25)
CALCIUM SPEC-SCNC: 8.8 MG/DL (ref 8.2–9.6)
CALCIUM SPEC-SCNC: 8.9 MG/DL (ref 8.2–9.6)
CALCIUM SPEC-SCNC: 9.5 MG/DL (ref 8.2–9.6)
CALCIUM SPEC-SCNC: 9.6 MG/DL (ref 8.2–9.6)
CALCIUM SPEC-SCNC: 9.7 MG/DL (ref 8.2–9.6)
CALCIUM SPEC-SCNC: 9.9 MG/DL (ref 8.2–9.6)
CHLORIDE SERPL-SCNC: 90 MMOL/L (ref 98–107)
CHLORIDE SERPL-SCNC: 96 MMOL/L (ref 98–107)
CHLORIDE SERPL-SCNC: 98 MMOL/L (ref 98–107)
CO2 SERPL-SCNC: 27.2 MMOL/L (ref 22–29)
CO2 SERPL-SCNC: 27.9 MMOL/L (ref 22–29)
CO2 SERPL-SCNC: 29.9 MMOL/L (ref 22–29)
CO2 SERPL-SCNC: 30.5 MMOL/L (ref 22–29)
CO2 SERPL-SCNC: 31.8 MMOL/L (ref 22–29)
CO2 SERPL-SCNC: 35.9 MMOL/L (ref 22–29)
CREAT SERPL-MCNC: 1.1 MG/DL (ref 0.57–1)
CREAT SERPL-MCNC: 1.29 MG/DL (ref 0.57–1)
CREAT SERPL-MCNC: 1.33 MG/DL (ref 0.57–1)
CREAT SERPL-MCNC: 1.35 MG/DL (ref 0.57–1)
CREAT SERPL-MCNC: 1.4 MG/DL (ref 0.57–1)
CREAT SERPL-MCNC: 1.63 MG/DL (ref 0.57–1)
DEPRECATED RDW RBC AUTO: 50.1 FL (ref 37–54)
DEPRECATED RDW RBC AUTO: 50.4 FL (ref 37–54)
DEPRECATED RDW RBC AUTO: 52.1 FL (ref 37–54)
DEPRECATED RDW RBC AUTO: 53.2 FL (ref 37–54)
EGFRCR SERPLBLD CKD-EPI 2021: 29.5 ML/MIN/1.73
EGFRCR SERPLBLD CKD-EPI 2021: 35.4 ML/MIN/1.73
EGFRCR SERPLBLD CKD-EPI 2021: 36.9 ML/MIN/1.73
EGFRCR SERPLBLD CKD-EPI 2021: 47.2 ML/MIN/1.73
EOSINOPHIL # BLD AUTO: 0.03 10*3/MM3 (ref 0–0.4)
EOSINOPHIL # BLD AUTO: 0.1 10*3/MM3 (ref 0–0.4)
EOSINOPHIL # BLD MANUAL: 0.06 10*3/MM3 (ref 0–0.4)
EOSINOPHIL NFR BLD AUTO: 0.5 % (ref 0.3–6.2)
EOSINOPHIL NFR BLD AUTO: 1.3 % (ref 0.3–6.2)
EOSINOPHIL NFR BLD MANUAL: 1 % (ref 0.3–6.2)
ERYTHROCYTE [DISTWIDTH] IN BLOOD BY AUTOMATED COUNT: 13.1 % (ref 12.3–15.4)
ERYTHROCYTE [DISTWIDTH] IN BLOOD BY AUTOMATED COUNT: 13.2 % (ref 12.3–15.4)
ERYTHROCYTE [DISTWIDTH] IN BLOOD BY AUTOMATED COUNT: 13.7 % (ref 12.3–15.4)
ERYTHROCYTE [DISTWIDTH] IN BLOOD BY AUTOMATED COUNT: 13.9 % (ref 12.3–15.4)
GFR SERPL CREATININE-BSD FRML MDRD: 37 ML/MIN/1.73
GFR SERPL CREATININE-BSD FRML MDRD: 39 ML/MIN/1.73
GLOBULIN UR ELPH-MCNC: 2.3 GM/DL
GLOBULIN UR ELPH-MCNC: 2.5 GM/DL
GLOBULIN UR ELPH-MCNC: 2.8 GM/DL
GLOBULIN UR ELPH-MCNC: 2.9 GM/DL
GLOBULIN UR ELPH-MCNC: 3.2 GM/DL
GLUCOSE SERPL-MCNC: 107 MG/DL (ref 65–99)
GLUCOSE SERPL-MCNC: 122 MG/DL (ref 65–99)
GLUCOSE SERPL-MCNC: 134 MG/DL (ref 65–99)
GLUCOSE SERPL-MCNC: 172 MG/DL (ref 65–99)
GLUCOSE SERPL-MCNC: 191 MG/DL (ref 65–99)
GLUCOSE SERPL-MCNC: 91 MG/DL (ref 65–99)
HCT VFR BLD AUTO: 31.9 % (ref 34–46.6)
HCT VFR BLD AUTO: 32.3 % (ref 34–46.6)
HCT VFR BLD AUTO: 33.7 % (ref 34–46.6)
HCT VFR BLD AUTO: 33.8 % (ref 34–46.6)
HGB BLD-MCNC: 10.1 G/DL (ref 12–15.9)
HGB BLD-MCNC: 10.4 G/DL (ref 12–15.9)
HGB BLD-MCNC: 11.2 G/DL (ref 12–15.9)
HGB BLD-MCNC: 11.2 G/DL (ref 12–15.9)
IMM GRANULOCYTES # BLD AUTO: 0.02 10*3/MM3 (ref 0–0.05)
IMM GRANULOCYTES # BLD AUTO: 0.03 10*3/MM3 (ref 0–0.05)
IMM GRANULOCYTES NFR BLD AUTO: 0.3 % (ref 0–0.5)
IMM GRANULOCYTES NFR BLD AUTO: 0.4 % (ref 0–0.5)
LYMPHOCYTES # BLD AUTO: 0.8 10*3/MM3 (ref 0.7–3.1)
LYMPHOCYTES # BLD AUTO: 0.91 10*3/MM3 (ref 0.7–3.1)
LYMPHOCYTES # BLD MANUAL: 0.68 10*3/MM3 (ref 0.7–3.1)
LYMPHOCYTES # BLD MANUAL: 0.85 10*3/MM3 (ref 0.7–3.1)
LYMPHOCYTES NFR BLD AUTO: 11.9 % (ref 19.6–45.3)
LYMPHOCYTES NFR BLD AUTO: 12.1 % (ref 19.6–45.3)
LYMPHOCYTES NFR BLD MANUAL: 10.1 % (ref 5–12)
LYMPHOCYTES NFR BLD MANUAL: 9 % (ref 5–12)
MACROCYTES BLD QL SMEAR: ABNORMAL
MCH RBC QN AUTO: 32.6 PG (ref 26.6–33)
MCH RBC QN AUTO: 33.3 PG (ref 26.6–33)
MCH RBC QN AUTO: 35.2 PG (ref 26.6–33)
MCH RBC QN AUTO: 35.4 PG (ref 26.6–33)
MCHC RBC AUTO-ENTMCNC: 31.3 G/DL (ref 31.5–35.7)
MCHC RBC AUTO-ENTMCNC: 32.6 G/DL (ref 31.5–35.7)
MCHC RBC AUTO-ENTMCNC: 33.1 G/DL (ref 31.5–35.7)
MCHC RBC AUTO-ENTMCNC: 33.2 G/DL (ref 31.5–35.7)
MCV RBC AUTO: 102.2 FL (ref 79–97)
MCV RBC AUTO: 104.2 FL (ref 79–97)
MCV RBC AUTO: 106 FL (ref 79–97)
MCV RBC AUTO: 107 FL (ref 79–97)
MONOCYTES # BLD AUTO: 0.66 10*3/MM3 (ref 0.1–0.9)
MONOCYTES # BLD AUTO: 0.71 10*3/MM3 (ref 0.1–0.9)
MONOCYTES # BLD: 0.55 10*3/MM3 (ref 0.1–0.9)
MONOCYTES # BLD: 0.71 10*3/MM3 (ref 0.1–0.9)
MONOCYTES NFR BLD AUTO: 10.7 % (ref 5–12)
MONOCYTES NFR BLD AUTO: 8.6 % (ref 5–12)
NEUTROPHILS # BLD AUTO: 4.74 10*3/MM3 (ref 1.7–7)
NEUTROPHILS # BLD AUTO: 5.46 10*3/MM3 (ref 1.7–7)
NEUTROPHILS NFR BLD AUTO: 5.02 10*3/MM3 (ref 1.7–7)
NEUTROPHILS NFR BLD AUTO: 5.89 10*3/MM3 (ref 1.7–7)
NEUTROPHILS NFR BLD AUTO: 75.8 % (ref 42.7–76)
NEUTROPHILS NFR BLD AUTO: 77.1 % (ref 42.7–76)
NEUTROPHILS NFR BLD MANUAL: 77 % (ref 42.7–76)
NEUTROPHILS NFR BLD MANUAL: 77.8 % (ref 42.7–76)
NRBC BLD AUTO-RTO: 0 /100 WBC (ref 0–0.2)
NT-PROBNP SERPL-MCNC: 8502 PG/ML (ref 0–1800)
NT-PROBNP SERPL-MCNC: ABNORMAL PG/ML (ref 0–1800)
PLAT MORPH BLD: NORMAL
PLAT MORPH BLD: NORMAL
PLATELET # BLD AUTO: 267 10*3/MM3 (ref 140–450)
PLATELET # BLD AUTO: 273 10*3/MM3 (ref 140–450)
PLATELET # BLD AUTO: 279 10*3/MM3 (ref 140–450)
PLATELET # BLD AUTO: 304 10*3/MM3 (ref 140–450)
PMV BLD AUTO: 10 FL (ref 6–12)
PMV BLD AUTO: 10.2 FL (ref 6–12)
PMV BLD AUTO: 10.2 FL (ref 6–12)
PMV BLD AUTO: 9.9 FL (ref 6–12)
POIKILOCYTOSIS BLD QL SMEAR: ABNORMAL
POTASSIUM SERPL-SCNC: 3.6 MMOL/L (ref 3.5–5.2)
POTASSIUM SERPL-SCNC: 4 MMOL/L (ref 3.5–5.2)
POTASSIUM SERPL-SCNC: 4 MMOL/L (ref 3.5–5.2)
POTASSIUM SERPL-SCNC: 4.1 MMOL/L (ref 3.5–5.2)
POTASSIUM SERPL-SCNC: 4.2 MMOL/L (ref 3.5–5.2)
POTASSIUM SERPL-SCNC: 4.4 MMOL/L (ref 3.5–5.2)
PROT SERPL-MCNC: 5.8 G/DL (ref 6–8.5)
PROT SERPL-MCNC: 6.1 G/DL (ref 6–8.5)
PROT SERPL-MCNC: 6.8 G/DL (ref 6–8.5)
PROT SERPL-MCNC: 7.1 G/DL (ref 6–8.5)
PROT SERPL-MCNC: 7.3 G/DL (ref 6–8.5)
RBC # BLD AUTO: 3.1 10*6/MM3 (ref 3.77–5.28)
RBC # BLD AUTO: 3.12 10*6/MM3 (ref 3.77–5.28)
RBC # BLD AUTO: 3.16 10*6/MM3 (ref 3.77–5.28)
RBC # BLD AUTO: 3.18 10*6/MM3 (ref 3.77–5.28)
SODIUM SERPL-SCNC: 136 MMOL/L (ref 136–145)
SODIUM SERPL-SCNC: 136 MMOL/L (ref 136–145)
SODIUM SERPL-SCNC: 137 MMOL/L (ref 136–145)
SODIUM SERPL-SCNC: 138 MMOL/L (ref 136–145)
SODIUM SERPL-SCNC: 139 MMOL/L (ref 136–145)
SODIUM SERPL-SCNC: 139 MMOL/L (ref 136–145)
VARIANT LYMPHS NFR BLD MANUAL: 11 % (ref 19.6–45.3)
VARIANT LYMPHS NFR BLD MANUAL: 12.1 % (ref 19.6–45.3)
WBC MORPH BLD: NORMAL
WBC MORPH BLD: NORMAL
WBC NRBC COR # BLD: 6.16 10*3/MM3 (ref 3.4–10.8)
WBC NRBC COR # BLD: 6.62 10*3/MM3 (ref 3.4–10.8)
WBC NRBC COR # BLD: 7.02 10*3/MM3 (ref 3.4–10.8)
WBC NRBC COR # BLD: 7.64 10*3/MM3 (ref 3.4–10.8)

## 2022-01-01 PROCEDURE — 99214 OFFICE O/P EST MOD 30 MIN: CPT | Performed by: NURSE PRACTITIONER

## 2022-01-01 PROCEDURE — 85007 BL SMEAR W/DIFF WBC COUNT: CPT

## 2022-01-01 PROCEDURE — 85025 COMPLETE CBC W/AUTO DIFF WBC: CPT

## 2022-01-01 PROCEDURE — 99214 OFFICE O/P EST MOD 30 MIN: CPT | Performed by: INTERNAL MEDICINE

## 2022-01-01 PROCEDURE — 80053 COMPREHEN METABOLIC PANEL: CPT

## 2022-01-01 PROCEDURE — 96372 THER/PROPH/DIAG INJ SC/IM: CPT | Performed by: NURSE PRACTITIONER

## 2022-01-01 PROCEDURE — G0180 MD CERTIFICATION HHA PATIENT: HCPCS | Performed by: INTERNAL MEDICINE

## 2022-01-01 PROCEDURE — 83880 ASSAY OF NATRIURETIC PEPTIDE: CPT

## 2022-01-01 PROCEDURE — 80048 BASIC METABOLIC PNL TOTAL CA: CPT

## 2022-01-01 PROCEDURE — 99213 OFFICE O/P EST LOW 20 MIN: CPT | Performed by: NURSE PRACTITIONER

## 2022-01-01 RX ORDER — CEFTRIAXONE 1 G/1
1 INJECTION, POWDER, FOR SOLUTION INTRAMUSCULAR; INTRAVENOUS ONCE
Status: COMPLETED | OUTPATIENT
Start: 2022-01-01 | End: 2022-01-01

## 2022-01-01 RX ORDER — LIOTHYRONINE SODIUM 5 UG/1
5 TABLET ORAL DAILY
Qty: 90 TABLET | Refills: 1 | Status: SHIPPED | OUTPATIENT
Start: 2022-01-01

## 2022-01-01 RX ORDER — PANTOPRAZOLE SODIUM 40 MG/1
TABLET, DELAYED RELEASE ORAL
Qty: 90 TABLET | Refills: 1 | Status: SHIPPED | OUTPATIENT
Start: 2022-01-01

## 2022-01-01 RX ORDER — GABAPENTIN 300 MG/1
CAPSULE ORAL
Qty: 180 CAPSULE | Refills: 0 | Status: SHIPPED | OUTPATIENT
Start: 2022-01-01 | End: 2022-01-01

## 2022-01-01 RX ORDER — METOLAZONE 2.5 MG/1
TABLET ORAL
Qty: 15 TABLET | Refills: 1
Start: 2022-01-01 | End: 2022-01-01

## 2022-01-01 RX ORDER — METOLAZONE 2.5 MG/1
TABLET ORAL
Qty: 15 TABLET | Refills: 1 | Status: SHIPPED | OUTPATIENT
Start: 2022-01-01 | End: 2022-01-01

## 2022-01-01 RX ORDER — CALCIUM POLYCARBOPHIL 625 MG
625 TABLET ORAL DAILY
COMMUNITY

## 2022-01-01 RX ORDER — METOLAZONE 2.5 MG/1
2.5 TABLET ORAL DAILY
Qty: 15 TABLET | Refills: 1 | Status: SHIPPED | OUTPATIENT
Start: 2022-01-01 | End: 2022-01-01

## 2022-01-01 RX ORDER — METOLAZONE 2.5 MG/1
TABLET ORAL
Qty: 15 TABLET | Refills: 1
Start: 2022-01-01 | End: 2022-01-01 | Stop reason: SDUPTHER

## 2022-01-01 RX ORDER — DOXYCYCLINE HYCLATE 100 MG/1
100 CAPSULE ORAL 2 TIMES DAILY
Qty: 20 CAPSULE | Refills: 0 | Status: SHIPPED | OUTPATIENT
Start: 2022-01-01 | End: 2022-01-01

## 2022-01-01 RX ORDER — METOLAZONE 2.5 MG/1
TABLET ORAL
Qty: 30 TABLET | Refills: 2 | Status: SHIPPED | OUTPATIENT
Start: 2022-01-01 | End: 2022-01-01 | Stop reason: SINTOL

## 2022-01-01 RX ORDER — METOLAZONE 2.5 MG/1
2.5 TABLET ORAL DAILY
Qty: 3 TABLET | Refills: 0
Start: 2022-01-01 | End: 2022-01-01 | Stop reason: SDUPTHER

## 2022-01-01 RX ORDER — APIXABAN 5 MG/1
TABLET, FILM COATED ORAL
Qty: 180 TABLET | Refills: 3 | Status: SHIPPED | OUTPATIENT
Start: 2022-01-01 | End: 2022-01-01

## 2022-01-01 RX ORDER — GABAPENTIN 300 MG/1
CAPSULE ORAL
Qty: 180 CAPSULE | Refills: 0 | Status: SHIPPED | OUTPATIENT
Start: 2022-01-01

## 2022-01-01 RX ORDER — CEFDINIR 300 MG/1
300 CAPSULE ORAL DAILY
Qty: 10 CAPSULE | Refills: 0
Start: 2022-01-01

## 2022-01-01 RX ORDER — ATORVASTATIN CALCIUM 40 MG/1
TABLET, FILM COATED ORAL
Qty: 90 TABLET | Refills: 2 | Status: SHIPPED | OUTPATIENT
Start: 2022-01-01

## 2022-01-01 RX ORDER — TRIMETHOPRIM 100 MG/1
TABLET ORAL
Qty: 90 TABLET | Refills: 3 | Status: SHIPPED | OUTPATIENT
Start: 2022-01-01

## 2022-01-01 RX ORDER — CEFDINIR 300 MG/1
300 CAPSULE ORAL 2 TIMES DAILY
Qty: 16 CAPSULE | Refills: 0 | Status: SHIPPED | OUTPATIENT
Start: 2022-01-01 | End: 2022-01-01

## 2022-01-01 RX ORDER — CEFDINIR 300 MG/1
300 CAPSULE ORAL DAILY
Qty: 10 CAPSULE | Refills: 0 | Status: SHIPPED | OUTPATIENT
Start: 2022-01-01 | End: 2022-01-01

## 2022-01-01 RX ORDER — BUMETANIDE 2 MG/1
2 TABLET ORAL DAILY
Qty: 30 TABLET | Refills: 5 | Status: SHIPPED | OUTPATIENT
Start: 2022-01-01

## 2022-01-01 RX ORDER — CEFTRIAXONE 1 G/1
2 INJECTION, POWDER, FOR SOLUTION INTRAMUSCULAR; INTRAVENOUS ONCE
Status: COMPLETED | OUTPATIENT
Start: 2022-01-01 | End: 2022-01-01

## 2022-01-01 RX ADMIN — CEFTRIAXONE 1 G: 1 INJECTION, POWDER, FOR SOLUTION INTRAMUSCULAR; INTRAVENOUS at 16:35

## 2022-01-01 RX ADMIN — CEFTRIAXONE 1 G: 1 INJECTION, POWDER, FOR SOLUTION INTRAMUSCULAR; INTRAVENOUS at 15:14

## 2022-01-01 RX ADMIN — CEFTRIAXONE 2 G: 1 INJECTION, POWDER, FOR SOLUTION INTRAMUSCULAR; INTRAVENOUS at 15:15

## 2022-01-01 RX ADMIN — CEFTRIAXONE 1 G: 1 INJECTION, POWDER, FOR SOLUTION INTRAMUSCULAR; INTRAVENOUS at 16:05

## 2022-01-03 NOTE — TELEPHONE ENCOUNTER
Rx Refill Note  Requested Prescriptions     Pending Prescriptions Disp Refills   • Eliquis 5 MG tablet tablet [Pharmacy Med Name: ELIQUIS 5 MG TABLET] 180 tablet 3     Sig: TAKE 1 TABLET BY MOUTH TWICE A DAY      Last office visit with prescribing clinician: 7/22/2021      Next office visit with prescribing clinician: none           Maria R Rodriguez RN  01/03/22, 09:28 EST

## 2022-01-04 PROBLEM — L08.9 SKIN INFECTION: Status: RESOLVED | Noted: 2019-07-25 | Resolved: 2022-01-01

## 2022-01-04 PROBLEM — R54 ADVANCED AGE: Status: RESOLVED | Noted: 2019-01-25 | Resolved: 2022-01-01

## 2022-01-04 NOTE — TELEPHONE ENCOUNTER
PATIENTS DAUGHTER STATED PATIENT COMPLETED THE MEDICATION DOCTOR WILLIAN PRESCRIBED.      DAUGHTER STATED PATIENT HAS LOST FLUID WEIGHT, STILL HAS A BADLY SWOLLEN LEG.    HUB SCHEDULED PATIENT WITH DOCTOR BUCKNER 1/4/22 @ 3:30P.

## 2022-01-04 NOTE — PATIENT INSTRUCTIONS
Labs today.   Ace wrap applied for light compression as tolerated.  Keep leg elevated as much as possible.  Discontinue metolazone at this time and continue furosemide 40 mg daily  Adding doxycycline twice a day with food for 10 days.  Rocephin injection (antibiotic) in office today.  Follow-up here on Friday.    Refill Eliquis 2.5 mg twice daily.        Cellulitis, Adult    Cellulitis is a skin infection. The infected area is often warm, red, swollen, and sore. It occurs most often in the arms and lower legs. It is very important to get treated for this condition.  What are the causes?  This condition is caused by bacteria. The bacteria enter through a break in the skin, such as a cut, burn, insect bite, open sore, or crack.  What increases the risk?  This condition is more likely to occur in people who:  · Have a weak body defense system (immune system).  · Have open cuts, burns, bites, or scrapes on the skin.  · Are older than 60 years of age.  · Have a blood sugar problem (diabetes).  · Have a long-lasting (chronic) liver disease (cirrhosis) or kidney disease.  · Are very overweight (obese).  · Have a skin problem, such as:  ? Itchy rash (eczema).  ? Slow movement of blood in the veins (venous stasis).  ? Fluid buildup below the skin (edema).  · Have been treated with high-energy rays (radiation).  · Use IV drugs.  What are the signs or symptoms?  Symptoms of this condition include:  · Skin that is:  ? Red.  ? Streaking.  ? Spotting.  ? Swollen.  ? Sore or painful when you touch it.  ? Warm.  · A fever.  · Chills.  · Blisters.  How is this diagnosed?  This condition is diagnosed based on:  · Medical history.  · Physical exam.  · Blood tests.  · Imaging tests.  How is this treated?  Treatment for this condition may include:  · Medicines to treat infections or allergies.  · Home care, such as:  ? Rest.  ? Placing cold or warm cloths (compresses) on the skin.  · Hospital care, if the condition is very bad.  Follow  these instructions at home:  Medicines  · Take over-the-counter and prescription medicines only as told by your doctor.  · If you were prescribed an antibiotic medicine, take it as told by your doctor. Do not stop taking it even if you start to feel better.  General instructions    · Drink enough fluid to keep your pee (urine) pale yellow.  · Do not touch or rub the infected area.  · Raise (elevate) the infected area above the level of your heart while you are sitting or lying down.  · Place cold or warm cloths on the area as told by your doctor.  · Keep all follow-up visits as told by your doctor. This is important.    Contact a doctor if:  · You have a fever.  · You do not start to get better after 1-2 days of treatment.  · Your bone or joint under the infected area starts to hurt after the skin has healed.  · Your infection comes back. This can happen in the same area or another area.  · You have a swollen bump in the area.  · You have new symptoms.  · You feel ill and have muscle aches and pains.  Get help right away if:  · Your symptoms get worse.  · You feel very sleepy.  · You throw up (vomit) or have watery poop (diarrhea) for a long time.  · You see red streaks coming from the area.  · Your red area gets larger.  · Your red area turns dark in color.  These symptoms may represent a serious problem that is an emergency. Do not wait to see if the symptoms will go away. Get medical help right away. Call your local emergency services (911 in the [place] Do not drive yourself to the hospital.  Summary  · Cellulitis is a skin infection. The area is often warm, red, swollen, and sore.  · This condition is treated with medicines, rest, and cold and warm cloths.  · Take all medicines only as told by your doctor.  · Tell your doctor if symptoms do not start to get better after 1-2 days of treatment.  This information is not intended to replace advice given to you by your health care provider. Make sure you discuss any  questions you have with your health care provider.  Document Revised: 05/09/2019 Document Reviewed: 05/09/2019  Elsevier Patient Education © 2021 [employer]

## 2022-01-04 NOTE — PROGRESS NOTES
Amparo Valdes  2/3/1930  [id number]  Patient Care Team:  Nj Mckeon MD as PCP - General  Nj Mckeon MD as PCP - Family Medicine  Harjeet Martinez MD as Consulting Physician (Interventional Cardiology)  Dwight De Souza MD as Consulting Physician (Urology)    Amparo Valdes is a pleasant 89 y.o. female who presents for evaluation of Edema    This patient is accompanied by their daughter who contributes to the history of their care.  Chief Complaint   Patient presents with   • Edema       Mrs. Valdes has a pertinent medical history including paroxysmal atrial fibrillation (on Eliquis), hyperlipidemia, ischemic heart disease, hypertension CHF, mild COPD, chronic UTI (on trimethoprim), hypothyroidism, and GERD.    HPI:   Mrs. Valdes presents for evaluation of right lower extremity swelling and pain.  She had bilateral lower extremity swelling starting 12/24/2021 that was worse on the right side.  She is on furosemide 40 mg daily and was instructed to add metolazone 2.5 mg daily for 5 days which she has done.  Weight has decreased and there is no left lower extremity swelling now but right leg has worsened.  She is oxygen dependent on 3 L.  She lives in assisted living.  She was hospitalized at [place] for a Covid and bilateral pneumonia infection at the end of October as well as a urinary tract infection.  She was on quite a bit of new medication including antibiotics and monoclonal antibody therapy.  She reports she did get back to her baseline after that illness.  She reports no increase in shortness of breath although her daughter indicates it seems worse to her.  Patient is alert and oriented and is a good historian.    Question about Eliquis.  Has been on 2.5 mg twice daily and refill yesterday was sent for 5 mg twice daily.  Past Medical History:   Diagnosis Date   • Arthritis    • Cancer, skin, squamous cell 12/27/2012   • Colon polyps 1987    clear 1994   • COPD (chronic obstructive  pulmonary disease) ([facility])    • Family history of coronary artery disease    • Family history of hyperlipidemia    • Family history of hypertension    • Family history of stroke    • Femoral hernia of right side 12/13/2011   • Hyperlipidemia    • Hypertension    • Hypothyroidism    • Osteoarthritis    • Osteoporosis    • Pneumonia    • Rectal fistula     repair   • SBO (small bowel obstruction) ([facility]) 09/13/2012    Had right inguinal hernia repair   • Sepsis ([facility]) 04/2014    - Drug therapy    • Stress incontinence     A and P  repair   • Thyroid disease      Past Surgical History:   Procedure Laterality Date   • ANAL FISTULA REPAIR     • CARDIAC SURGERY  10/2011    stents placed   • CATARACT EXTRACTION     • CORONARY ANGIOPLASTY WITH STENT PLACEMENT  11/2011    x3   • HYSTERECTOMY     • INGUINAL HERNIA REPAIR     • OTHER SURGICAL HISTORY      thyroid uptake   • VAGINAL VAULT SUSPENSION       Family History   Problem Relation Age of Onset   • Stroke Mother    • Heart disease Father    • Hypertension Brother    • Diabetes Brother    • Diabetes Daughter    • Heart disease Daughter    • Hypertension Son    • Hypertension Other    • Coronary artery disease Other    • Stroke Other    • Hyperlipidemia Other    • Other Other         ichthyosis     Social History     Tobacco Use   Smoking Status Former Smoker   Smokeless Tobacco Former User   Tobacco Comment    quit at approx age 60     Allergies   Allergen Reactions   • Acetaminophen Unknown (See Comments)     Darvocet- N100   • Baycol [Cerivastatin] Unknown (See Comments)     unknown   • Ciprofloxacin Unknown (See Comments)     Unknown   • Ciprofloxacin Hcl Unknown (See Comments)     unknown   • Codeine Unknown (See Comments)     unknown   • Fosamax [Alendronate Sodium] Unknown (See Comments)     unknown   • Meperidine Unknown (See Comments)     unknown   • Methadone Unknown (See Comments)     unknown   • Morphine Unknown (See Comments)     unknown   • Naloxone Unknown (See  Comments)     unknown   • Propoxyphene Unknown (See Comments)     darvocet-n 100   • Sulfa Antibiotics Unknown (See Comments)     unknown   • Ubidecarenone GI Intolerance   • Zocor [Simvastatin] Unknown (See Comments)     unknown       Current Outpatient Medications:   •  apixaban (ELIQUIS) 2.5 MG tablet tablet, Take 1 tablet by mouth 2 (Two) Times a Day., Disp: 60 tablet, Rfl: 11  •  atorvastatin (LIPITOR) 40 MG tablet, TAKE 1 TABLET BY MOUTH EVERY DAY, Disp: 90 tablet, Rfl: 2  •  calcium carbonate-vitamin d (CALTRATE 600+D) 600-400 MG-UNIT per tablet, Take 1 tablet by mouth Daily., Disp: , Rfl:   •  carvedilol (Coreg) 6.25 MG tablet, Take 1 tablet by mouth 2 (Two) Times a Day With Meals., Disp: 180 tablet, Rfl: 3  •  furosemide (LASIX) 40 MG tablet, TAKE 1 TABLET BY MOUTH EVERY DAY, Disp: 90 tablet, Rfl: 3  •  gabapentin (NEURONTIN) 300 MG capsule, TAKE 1 CAPSULE BY MOUTH TWICE A DAY, Disp: 180 capsule, Rfl: 0  •  KLOR-CON 10 MEQ CR tablet, TAKE 1 TABLET BY MOUTH EVERY DAY, Disp: 90 tablet, Rfl: 3  •  levothyroxine (SYNTHROID, LEVOTHROID) 50 MCG tablet, Note chg dose to 50 mcg from 75, Disp: 90 tablet, Rfl: 3  •  liothyronine (CYTOMEL) 5 MCG tablet, TAKE 1 TABLET BY MOUTH EVERY DAY, Disp: 90 tablet, Rfl: 1  •  Multiple Vitamins-Minerals (CENTRUM SILVER PO), Take 1 tablet by mouth Daily., Disp: , Rfl:   •  O2 (OXYGEN), Inhale 2 L/min Every Night. Takes 3 l every night, Disp: , Rfl:   •  pantoprazole (PROTONIX) 40 MG EC tablet, Take 1 tablet by mouth Daily., Disp: 90 tablet, Rfl: 1  •  Propylene Glycol (Systane Complete) 0.6 % solution, Apply 1 drop to eye(s) as directed by provider Daily As Needed., Disp: , Rfl:   •  trimethoprim (TRIMPEX) 100 MG tablet, Take 1 tablet by mouth Daily., Disp: 90 tablet, Rfl: 3  •  doxycycline (VIBRAMYCIN) 100 MG capsule, Take 1 capsule by mouth 2 (Two) Times a Day for 10 days., Disp: 20 capsule, Rfl: 0  No current facility-administered medications for this visit.    Review of  "Systems  /64 (BP Location: Left arm, Patient Position: Sitting, Cuff Size: Adult)   Pulse 68   Temp 97.7 °F (36.5 °C) (Temporal)   Ht 165.1 cm (65\")   Wt 56.2 kg (124 lb)   BMI 20.63 kg/m²     Physical Exam  Constitutional:       Appearance: She is well-developed.   HENT:      Head: Normocephalic and atraumatic.      Comments: *wearing mask  Eyes:      Conjunctiva/sclera: Conjunctivae normal.      Pupils: Pupils are equal, round, and reactive to light.   Cardiovascular:      Rate and Rhythm: Normal rate. Rhythm irregularly irregular.      Pulses: Normal pulses.      Heart sounds: Normal heart sounds.      Comments: Right leg: No skin breakdown, red warm and tender to touch, ecchymosis anterior mid shin approximate 2 to 3 cm oval  Pulmonary:      Effort: Pulmonary effort is normal.      Breath sounds: Normal breath sounds.   Musculoskeletal:         General: Normal range of motion.      Cervical back: Normal range of motion and neck supple.      Right lower le+ Pitting Edema present.      Left lower leg: No edema.   Skin:     General: Skin is warm and dry.   Neurological:      Mental Status: She is alert and oriented to person, place, and time.   Psychiatric:         Mood and Affect: Mood normal.         Behavior: Behavior normal.         Thought Content: Thought content normal.         Judgment: Judgment normal.         Procedures    Results Review:  None    PHQ-9 Total Score:      Assessment/Plan:  Diagnoses and all orders for this visit:    1. Cellulitis of right lower leg (Primary)  Comments:  Rocephin 1 g in office today, add doxycycline twice daily with food, continue daily trimethoprim  Orders:  -     CBC & Differential; Future  -     cefTRIAXone (ROCEPHIN) injection 1 g    2. Shortness of breath  Comments:  Labs today, continue oxygen 3 L.  Stop metolazone and continue furosemide 40 mg daily, follow-up here Friday afternoon  Orders:  -     Comprehensive Metabolic Panel; Future  -     BNP; " Future    3. Paroxysmal atrial fibrillation (HCC)  Comments:  Continue Eliquis 2.5 mg twice daily    4. Chronic congestive heart failure, unspecified heart failure type (HCC)  Comments:  Labs today, stop metolazone continue furosemide 40 mg daily    Other orders  -     doxycycline (VIBRAMYCIN) 100 MG capsule; Take 1 capsule by mouth 2 (Two) Times a Day for 10 days.  Dispense: 20 capsule; Refill: 0  -     apixaban (ELIQUIS) 2.5 MG tablet tablet; Take 1 tablet by mouth 2 (Two) Times a Day.  Dispense: 60 tablet; Refill: 11       Patient Instructions   Labs today.   Ace wrap applied for light compression as tolerated.  Keep leg elevated as much as possible.  Discontinue metolazone at this time and continue furosemide 40 mg daily  Adding doxycycline twice a day with food for 10 days.  Rocephin injection (antibiotic) in office today.  Follow-up here on Friday.    Refill Eliquis 2.5 mg twice daily.        Cellulitis, Adult    Cellulitis is a skin infection. The infected area is often warm, red, swollen, and sore. It occurs most often in the arms and lower legs. It is very important to get treated for this condition.  What are the causes?  This condition is caused by bacteria. The bacteria enter through a break in the skin, such as a cut, burn, insect bite, open sore, or crack.  What increases the risk?  This condition is more likely to occur in people who:  · Have a weak body defense system (immune system).  · Have open cuts, burns, bites, or scrapes on the skin.  · Are older than 60 years of age.  · Have a blood sugar problem (diabetes).  · Have a long-lasting (chronic) liver disease (cirrhosis) or kidney disease.  · Are very overweight (obese).  · Have a skin problem, such as:  ? Itchy rash (eczema).  ? Slow movement of blood in the veins (venous stasis).  ? Fluid buildup below the skin (edema).  · Have been treated with high-energy rays (radiation).  · Use IV drugs.  What are the signs or symptoms?  Symptoms of this  condition include:  · Skin that is:  ? Red.  ? Streaking.  ? Spotting.  ? Swollen.  ? Sore or painful when you touch it.  ? Warm.  · A fever.  · Chills.  · Blisters.  How is this diagnosed?  This condition is diagnosed based on:  · Medical history.  · Physical exam.  · Blood tests.  · Imaging tests.  How is this treated?  Treatment for this condition may include:  · Medicines to treat infections or allergies.  · Home care, such as:  ? Rest.  ? Placing cold or warm cloths (compresses) on the skin.  · Hospital care, if the condition is very bad.  Follow these instructions at home:  Medicines  · Take over-the-counter and prescription medicines only as told by your doctor.  · If you were prescribed an antibiotic medicine, take it as told by your doctor. Do not stop taking it even if you start to feel better.  General instructions    · Drink enough fluid to keep your pee (urine) pale yellow.  · Do not touch or rub the infected area.  · Raise (elevate) the infected area above the level of your heart while you are sitting or lying down.  · Place cold or warm cloths on the area as told by your doctor.  · Keep all follow-up visits as told by your doctor. This is important.    Contact a doctor if:  · You have a fever.  · You do not start to get better after 1-2 days of treatment.  · Your bone or joint under the infected area starts to hurt after the skin has healed.  · Your infection comes back. This can happen in the same area or another area.  · You have a swollen bump in the area.  · You have new symptoms.  · You feel ill and have muscle aches and pains.  Get help right away if:  · Your symptoms get worse.  · You feel very sleepy.  · You throw up (vomit) or have watery poop (diarrhea) for a long time.  · You see red streaks coming from the area.  · Your red area gets larger.  · Your red area turns dark in color.  These symptoms may represent a serious problem that is an emergency. Do not wait to see if the symptoms will go  away. Get medical help right away. Call your local emergency services (911 in the [place] Do not drive yourself to the hospital.  Summary  · Cellulitis is a skin infection. The area is often warm, red, swollen, and sore.  · This condition is treated with medicines, rest, and cold and warm cloths.  · Take all medicines only as told by your doctor.  · Tell your doctor if symptoms do not start to get better after 1-2 days of treatment.  This information is not intended to replace advice given to you by your health care provider. Make sure you discuss any questions you have with your health care provider.  Document Revised: 05/09/2019 Document Reviewed: 05/09/2019  [employer] Patient Education © 2021 [employer]      Plan of care reviewed with patient at the conclusion of today's visit. Education was provided regarding diagnosis, management and any prescribed or recommended OTC medications.  Patient verbalizes understanding of and agreement with management plan.    Return in about 3 days (around 1/7/2022) for Recheck cellulitis.    Dictated Utilizing Dragon Dictation.    YESENIA Fernandez

## 2022-01-10 NOTE — PROGRESS NOTES
Amparo Valdes  2/3/1930  [id number]  Patient Care Team:  Nj Mckeon MD as PCP - General  Nj Mckeon MD as PCP - Family Medicine  Harjeet Martinez MD as Consulting Physician (Interventional Cardiology)  Dwight De Souza MD as Consulting Physician (Urology)    Amparo Valdes is a pleasant 89 y.o. female who presents for evaluation of Cellulitis    This patient is accompanied by their daughter who contributes to the history of their care.  Chief Complaint   Patient presents with   • Cellulitis     Mrs. Valdes has a pertinent medical history including paroxysmal atrial fibrillation (on Eliquis), hyperlipidemia, ischemic heart disease, hypertension CHF, mild COPD, chronic UTI (on trimethoprim), hypothyroidism, and GERD.    HPI:   Ms. Valdes is here for a follow-up of RLE cellulitis.  Swelling and redness have improved some since her visit last week.  She is tolerating the oral antibiotic with some constipation.  No nausea or vomiting.  Leg swelling has improved some but using the Ace wrap is cumbersome for her.  She would prefer to go back to compression hose. She is oxygen dependent on 3 L.  She lives in assisted living.    She does her own showering.  Has not showered this week but using vinegar to clean skin.  She has 2 small circular areas of ulceration anterior right shin, both less than 0.5cm in diameter.  She has kept covered with Band-Aid.  Past Medical History:   Diagnosis Date   • Arthritis    • Cancer, skin, squamous cell 12/27/2012   • Colon polyps 1987    clear 1994   • COPD (chronic obstructive pulmonary disease) ([facility])    • Family history of coronary artery disease    • Family history of hyperlipidemia    • Family history of hypertension    • Family history of stroke    • Femoral hernia of right side 12/13/2011   • Hyperlipidemia    • Hypertension    • Hypothyroidism    • Osteoarthritis    • Osteoporosis    • Pneumonia    • Rectal fistula     repair   • SBO (small bowel obstruction) ([facility])  09/13/2012    Had right inguinal hernia repair   • Sepsis (HCC) 04/2014    - Drug therapy    • Stress incontinence     A and P  repair   • Thyroid disease      Past Surgical History:   Procedure Laterality Date   • ANAL FISTULA REPAIR     • CARDIAC SURGERY  10/2011    stents placed   • CATARACT EXTRACTION     • CORONARY ANGIOPLASTY WITH STENT PLACEMENT  11/2011    x3   • HYSTERECTOMY     • INGUINAL HERNIA REPAIR     • OTHER SURGICAL HISTORY      thyroid uptake   • VAGINAL VAULT SUSPENSION       Family History   Problem Relation Age of Onset   • Stroke Mother    • Heart disease Father    • Hypertension Brother    • Diabetes Brother    • Diabetes Daughter    • Heart disease Daughter    • Hypertension Son    • Hypertension Other    • Coronary artery disease Other    • Stroke Other    • Hyperlipidemia Other    • Other Other         ichthyosis     Social History     Tobacco Use   Smoking Status Former Smoker   Smokeless Tobacco Former User   Tobacco Comment    quit at approx age 60     Allergies   Allergen Reactions   • Acetaminophen Unknown (See Comments)     Darvocet- N100   • Baycol [Cerivastatin] Unknown (See Comments)     unknown   • Ciprofloxacin Unknown (See Comments)     Unknown   • Ciprofloxacin Hcl Unknown (See Comments)     unknown   • Codeine Unknown (See Comments)     unknown   • Fosamax [Alendronate Sodium] Unknown (See Comments)     unknown   • Meperidine Unknown (See Comments)     unknown   • Methadone Unknown (See Comments)     unknown   • Morphine Unknown (See Comments)     unknown   • Naloxone Unknown (See Comments)     unknown   • Propoxyphene Unknown (See Comments)     darvocet-n 100   • Sulfa Antibiotics Unknown (See Comments)     unknown   • Ubidecarenone GI Intolerance   • Zocor [Simvastatin] Unknown (See Comments)     unknown       Current Outpatient Medications:   •  apixaban (ELIQUIS) 2.5 MG tablet tablet, Take 1 tablet by mouth 2 (Two) Times a Day., Disp: 60 tablet, Rfl: 11  •  atorvastatin  "(LIPITOR) 40 MG tablet, TAKE 1 TABLET BY MOUTH EVERY DAY, Disp: 90 tablet, Rfl: 2  •  calcium carbonate-vitamin d (CALTRATE 600+D) 600-400 MG-UNIT per tablet, Take 1 tablet by mouth Daily., Disp: , Rfl:   •  carvedilol (Coreg) 6.25 MG tablet, Take 1 tablet by mouth 2 (Two) Times a Day With Meals., Disp: 180 tablet, Rfl: 3  •  doxycycline (VIBRAMYCIN) 100 MG capsule, Take 1 capsule by mouth 2 (Two) Times a Day for 10 days., Disp: 20 capsule, Rfl: 0  •  furosemide (LASIX) 40 MG tablet, TAKE 1 TABLET BY MOUTH EVERY DAY, Disp: 90 tablet, Rfl: 3  •  gabapentin (NEURONTIN) 300 MG capsule, TAKE 1 CAPSULE BY MOUTH TWICE A DAY, Disp: 180 capsule, Rfl: 0  •  KLOR-CON 10 MEQ CR tablet, TAKE 1 TABLET BY MOUTH EVERY DAY, Disp: 90 tablet, Rfl: 3  •  levothyroxine (SYNTHROID, LEVOTHROID) 50 MCG tablet, Note chg dose to 50 mcg from 75, Disp: 90 tablet, Rfl: 3  •  liothyronine (CYTOMEL) 5 MCG tablet, TAKE 1 TABLET BY MOUTH EVERY DAY, Disp: 90 tablet, Rfl: 1  •  Multiple Vitamins-Minerals (CENTRUM SILVER PO), Take 1 tablet by mouth Daily., Disp: , Rfl:   •  O2 (OXYGEN), Inhale 2 L/min Every Night. Takes 3 l every night, Disp: , Rfl:   •  pantoprazole (PROTONIX) 40 MG EC tablet, Take 1 tablet by mouth Daily., Disp: 90 tablet, Rfl: 1  •  Propylene Glycol (Systane Complete) 0.6 % solution, Apply 1 drop to eye(s) as directed by provider Daily As Needed., Disp: , Rfl:   •  trimethoprim (TRIMPEX) 100 MG tablet, Take 1 tablet by mouth Daily., Disp: 90 tablet, Rfl: 3  No current facility-administered medications for this visit.    Review of Systems  /78 (BP Location: Left arm, Patient Position: Sitting, Cuff Size: Adult)   Pulse 64   Temp 98.4 °F (36.9 °C) (Temporal)   Ht 165.1 cm (65\")   Wt 56.7 kg (125 lb)   BMI 20.80 kg/m²     Physical Exam  Vitals reviewed.   Constitutional:       Appearance: She is well-developed.   Cardiovascular:      Comments: Less swelling compared with last week (about 40% improved), continues 3+ pitting " edema, very small ulcerations right anterior shin, cleansed and Medihoney applied in office.  Erythema from toes to knee.  Pulmonary:      Effort: Pulmonary effort is normal.   Musculoskeletal:      Right lower leg: 3+ Edema present.      Left lower leg: No edema.   Skin:     General: Skin is warm and dry.   Neurological:      Mental Status: She is alert.   Psychiatric:         Behavior: Behavior normal.         Procedures    Results Review:  None    PHQ-9 Total Score:      Assessment/Plan:  Diagnoses and all orders for this visit:    1. Cellulitis of right lower extremity (Primary)  Comments:  Some improvement, Rocephin 1 g repeated today, finish oral antibiotics, add Medihoney to ulcerations, resume compression socks, leg elevation, repeat CBC  Orders:  -     Comprehensive Metabolic Panel; Future  -     cefTRIAXone (ROCEPHIN) injection 1 g  -     CBC & Differential; Future    2. Benign essential hypertension  Comments:  Stable, will repeat CMP       Patient Instructions   Clean leg daily with solution of half white vinegar and half warm water.  Dry completely and use medihoney to lesions on anterior shin.    Continue oral antibiotics.   Giving rocephin 1 g IM again in office today.   Increase water intake to 64 oz a day and add fiber or prune juice to help with constipation  Resume wearing your compression socks  Labs today        Cellulitis, Adult    Cellulitis is a skin infection. The infected area is often warm, red, swollen, and sore. It occurs most often in the arms and lower legs. It is very important to get treated for this condition.  What are the causes?  This condition is caused by bacteria. The bacteria enter through a break in the skin, such as a cut, burn, insect bite, open sore, or crack.  What increases the risk?  This condition is more likely to occur in people who:  · Have a weak body defense system (immune system).  · Have open cuts, burns, bites, or scrapes on the skin.  · Are older than 60  years of age.  · Have a blood sugar problem (diabetes).  · Have a long-lasting (chronic) liver disease (cirrhosis) or kidney disease.  · Are very overweight (obese).  · Have a skin problem, such as:  ? Itchy rash (eczema).  ? Slow movement of blood in the veins (venous stasis).  ? Fluid buildup below the skin (edema).  · Have been treated with high-energy rays (radiation).  · Use IV drugs.  What are the signs or symptoms?  Symptoms of this condition include:  · Skin that is:  ? Red.  ? Streaking.  ? Spotting.  ? Swollen.  ? Sore or painful when you touch it.  ? Warm.  · A fever.  · Chills.  · Blisters.  How is this diagnosed?  This condition is diagnosed based on:  · Medical history.  · Physical exam.  · Blood tests.  · Imaging tests.  How is this treated?  Treatment for this condition may include:  · Medicines to treat infections or allergies.  · Home care, such as:  ? Rest.  ? Placing cold or warm cloths (compresses) on the skin.  · Hospital care, if the condition is very bad.  Follow these instructions at home:  Medicines  · Take over-the-counter and prescription medicines only as told by your doctor.  · If you were prescribed an antibiotic medicine, take it as told by your doctor. Do not stop taking it even if you start to feel better.  General instructions    · Drink enough fluid to keep your pee (urine) pale yellow.  · Do not touch or rub the infected area.  · Raise (elevate) the infected area above the level of your heart while you are sitting or lying down.  · Place cold or warm cloths on the area as told by your doctor.  · Keep all follow-up visits as told by your doctor. This is important.    Contact a doctor if:  · You have a fever.  · You do not start to get better after 1-2 days of treatment.  · Your bone or joint under the infected area starts to hurt after the skin has healed.  · Your infection comes back. This can happen in the same area or another area.  · You have a swollen bump in the area.  · You  have new symptoms.  · You feel ill and have muscle aches and pains.  Get help right away if:  · Your symptoms get worse.  · You feel very sleepy.  · You throw up (vomit) or have watery poop (diarrhea) for a long time.  · You see red streaks coming from the area.  · Your red area gets larger.  · Your red area turns dark in color.  These symptoms may represent a serious problem that is an emergency. Do not wait to see if the symptoms will go away. Get medical help right away. Call your local emergency services (911 in the [place] Do not drive yourself to the hospital.  Summary  · Cellulitis is a skin infection. The area is often warm, red, swollen, and sore.  · This condition is treated with medicines, rest, and cold and warm cloths.  · Take all medicines only as told by your doctor.  · Tell your doctor if symptoms do not start to get better after 1-2 days of treatment.  This information is not intended to replace advice given to you by your health care provider. Make sure you discuss any questions you have with your health care provider.  Document Revised: 05/09/2019 Document Reviewed: 05/09/2019  [employer] Patient Education © 2021 [employer]      Plan of care reviewed with patient at the conclusion of today's visit. Education was provided regarding diagnosis, management and any prescribed or recommended OTC medications.  Patient verbalizes understanding of and agreement with management plan.    Return in about 2 days (around 1/12/2022) for Recheck.    Dictated Utilizing Dragon Dictation.    YESENIA Fernandez

## 2022-01-10 NOTE — PATIENT INSTRUCTIONS
Clean leg daily with solution of half white vinegar and half warm water.  Dry completely and use medihoney to lesions on anterior shin.    Continue oral antibiotics.   Giving rocephin 1 g IM again in office today.   Increase water intake to 64 oz a day and add fiber or prune juice to help with constipation  Resume wearing your compression socks  Labs today        Cellulitis, Adult    Cellulitis is a skin infection. The infected area is often warm, red, swollen, and sore. It occurs most often in the arms and lower legs. It is very important to get treated for this condition.  What are the causes?  This condition is caused by bacteria. The bacteria enter through a break in the skin, such as a cut, burn, insect bite, open sore, or crack.  What increases the risk?  This condition is more likely to occur in people who:  · Have a weak body defense system (immune system).  · Have open cuts, burns, bites, or scrapes on the skin.  · Are older than 60 years of age.  · Have a blood sugar problem (diabetes).  · Have a long-lasting (chronic) liver disease (cirrhosis) or kidney disease.  · Are very overweight (obese).  · Have a skin problem, such as:  ? Itchy rash (eczema).  ? Slow movement of blood in the veins (venous stasis).  ? Fluid buildup below the skin (edema).  · Have been treated with high-energy rays (radiation).  · Use IV drugs.  What are the signs or symptoms?  Symptoms of this condition include:  · Skin that is:  ? Red.  ? Streaking.  ? Spotting.  ? Swollen.  ? Sore or painful when you touch it.  ? Warm.  · A fever.  · Chills.  · Blisters.  How is this diagnosed?  This condition is diagnosed based on:  · Medical history.  · Physical exam.  · Blood tests.  · Imaging tests.  How is this treated?  Treatment for this condition may include:  · Medicines to treat infections or allergies.  · Home care, such as:  ? Rest.  ? Placing cold or warm cloths (compresses) on the skin.  · Hospital care, if the condition is very  bad.  Follow these instructions at home:  Medicines  · Take over-the-counter and prescription medicines only as told by your doctor.  · If you were prescribed an antibiotic medicine, take it as told by your doctor. Do not stop taking it even if you start to feel better.  General instructions    · Drink enough fluid to keep your pee (urine) pale yellow.  · Do not touch or rub the infected area.  · Raise (elevate) the infected area above the level of your heart while you are sitting or lying down.  · Place cold or warm cloths on the area as told by your doctor.  · Keep all follow-up visits as told by your doctor. This is important.    Contact a doctor if:  · You have a fever.  · You do not start to get better after 1-2 days of treatment.  · Your bone or joint under the infected area starts to hurt after the skin has healed.  · Your infection comes back. This can happen in the same area or another area.  · You have a swollen bump in the area.  · You have new symptoms.  · You feel ill and have muscle aches and pains.  Get help right away if:  · Your symptoms get worse.  · You feel very sleepy.  · You throw up (vomit) or have watery poop (diarrhea) for a long time.  · You see red streaks coming from the area.  · Your red area gets larger.  · Your red area turns dark in color.  These symptoms may represent a serious problem that is an emergency. Do not wait to see if the symptoms will go away. Get medical help right away. Call your local emergency services (911 in the [place] Do not drive yourself to the hospital.  Summary  · Cellulitis is a skin infection. The area is often warm, red, swollen, and sore.  · This condition is treated with medicines, rest, and cold and warm cloths.  · Take all medicines only as told by your doctor.  · Tell your doctor if symptoms do not start to get better after 1-2 days of treatment.  This information is not intended to replace advice given to you by your health care provider. Make sure you  discuss any questions you have with your health care provider.  Document Revised: 05/09/2019 Document Reviewed: 05/09/2019  [employer] Patient Education © 2021 [employer]

## 2022-01-11 NOTE — TELEPHONE ENCOUNTER
----- Message from YESENIA Fernandez sent at 1/11/2022  4:29 PM EST -----  Let her know labs are stable.  No change in plan and will see her for f/u tomorrow

## 2022-01-12 NOTE — PATIENT INSTRUCTIONS
Cellulitis, Adult    Cellulitis is a skin infection. The infected area is often warm, red, swollen, and sore. It occurs most often in the arms and lower legs. It is very important to get treated for this condition.  What are the causes?  This condition is caused by bacteria. The bacteria enter through a break in the skin, such as a cut, burn, insect bite, open sore, or crack.  What increases the risk?  This condition is more likely to occur in people who:  · Have a weak body defense system (immune system).  · Have open cuts, burns, bites, or scrapes on the skin.  · Are older than 60 years of age.  · Have a blood sugar problem (diabetes).  · Have a long-lasting (chronic) liver disease (cirrhosis) or kidney disease.  · Are very overweight (obese).  · Have a skin problem, such as:  ? Itchy rash (eczema).  ? Slow movement of blood in the veins (venous stasis).  ? Fluid buildup below the skin (edema).  · Have been treated with high-energy rays (radiation).  · Use IV drugs.  What are the signs or symptoms?  Symptoms of this condition include:  · Skin that is:  ? Red.  ? Streaking.  ? Spotting.  ? Swollen.  ? Sore or painful when you touch it.  ? Warm.  · A fever.  · Chills.  · Blisters.  How is this diagnosed?  This condition is diagnosed based on:  · Medical history.  · Physical exam.  · Blood tests.  · Imaging tests.  How is this treated?  Treatment for this condition may include:  · Medicines to treat infections or allergies.  · Home care, such as:  ? Rest.  ? Placing cold or warm cloths (compresses) on the skin.  · Hospital care, if the condition is very bad.  Follow these instructions at home:  Medicines  · Take over-the-counter and prescription medicines only as told by your doctor.  · If you were prescribed an antibiotic medicine, take it as told by your doctor. Do not stop taking it even if you start to feel better.  General instructions    · Drink enough fluid to keep your pee (urine) pale yellow.  · Do not touch  or rub the infected area.  · Raise (elevate) the infected area above the level of your heart while you are sitting or lying down.  · Place cold or warm cloths on the area as told by your doctor.  · Keep all follow-up visits as told by your doctor. This is important.    Contact a doctor if:  · You have a fever.  · You do not start to get better after 1-2 days of treatment.  · Your bone or joint under the infected area starts to hurt after the skin has healed.  · Your infection comes back. This can happen in the same area or another area.  · You have a swollen bump in the area.  · You have new symptoms.  · You feel ill and have muscle aches and pains.  Get help right away if:  · Your symptoms get worse.  · You feel very sleepy.  · You throw up (vomit) or have watery poop (diarrhea) for a long time.  · You see red streaks coming from the area.  · Your red area gets larger.  · Your red area turns dark in color.  These symptoms may represent a serious problem that is an emergency. Do not wait to see if the symptoms will go away. Get medical help right away. Call your local emergency services (911 in the [place] Do not drive yourself to the hospital.  Summary  · Cellulitis is a skin infection. The area is often warm, red, swollen, and sore.  · This condition is treated with medicines, rest, and cold and warm cloths.  · Take all medicines only as told by your doctor.  · Tell your doctor if symptoms do not start to get better after 1-2 days of treatment.  This information is not intended to replace advice given to you by your health care provider. Make sure you discuss any questions you have with your health care provider.  Document Revised: 05/09/2019 Document Reviewed: 05/09/2019  [employer] Patient Education © 2021 [employer]

## 2022-01-12 NOTE — PROGRESS NOTES
Amparo Valdes  2/3/1930  [id number]  Patient Care Team:  Nj Mckeon MD as PCP - General  Nj Mckeon MD as PCP - Family Medicine  Harjeet Martinez MD as Consulting Physician (Interventional Cardiology)  Dwight De Souza MD as Consulting Physician (Urology)    Amparo Valdes is a pleasant 89 y.o. female who presents for evaluation of Cellulitis    This patient is accompanied by their daughter who contributes to the history of their care.  Chief Complaint   Patient presents with   • Cellulitis       HPI:   Some improvement in RLE cellulitis compared with Monday, still has 3 + pitting edema, erythema and tenderness.  No weeping.  Using vinegar and water for cleansing and applying medihoney to both small ulcerations anterior shin.  Wearing compression sock.  unfortunatley she is having increased stool production starting last night.  She is on day 8 of doxy.  She has had rocephin 1 g last Tuesday and this Monday. (Appt last Friday deferred secondary to weather)  Past Medical History:   Diagnosis Date   • Arthritis    • Cancer, skin, squamous cell 12/27/2012   • Colon polyps 1987    clear 1994   • COPD (chronic obstructive pulmonary disease) ([facility])    • Family history of coronary artery disease    • Family history of hyperlipidemia    • Family history of hypertension    • Family history of stroke    • Femoral hernia of right side 12/13/2011   • Hyperlipidemia    • Hypertension    • Hypothyroidism    • Osteoarthritis    • Osteoporosis    • Pneumonia    • Rectal fistula     repair   • SBO (small bowel obstruction) ([facility]) 09/13/2012    Had right inguinal hernia repair   • Sepsis ([facility]) 04/2014    - Drug therapy    • Stress incontinence     A and P  repair   • Thyroid disease      Past Surgical History:   Procedure Laterality Date   • ANAL FISTULA REPAIR     • CARDIAC SURGERY  10/2011    stents placed   • CATARACT EXTRACTION     • CORONARY ANGIOPLASTY WITH STENT PLACEMENT  11/2011    x3   • HYSTERECTOMY     •  INGUINAL HERNIA REPAIR     • OTHER SURGICAL HISTORY      thyroid uptake   • VAGINAL VAULT SUSPENSION       Family History   Problem Relation Age of Onset   • Stroke Mother    • Heart disease Father    • Hypertension Brother    • Diabetes Brother    • Diabetes Daughter    • Heart disease Daughter    • Hypertension Son    • Hypertension Other    • Coronary artery disease Other    • Stroke Other    • Hyperlipidemia Other    • Other Other         ichthyosis     Social History     Tobacco Use   Smoking Status Former Smoker   Smokeless Tobacco Former User   Tobacco Comment    quit at approx age 60     Allergies   Allergen Reactions   • Acetaminophen Unknown (See Comments)     Darvocet- N100   • Baycol [Cerivastatin] Unknown (See Comments)     unknown   • Ciprofloxacin Unknown (See Comments)     Unknown   • Ciprofloxacin Hcl Unknown (See Comments)     unknown   • Codeine Unknown (See Comments)     unknown   • Fosamax [Alendronate Sodium] Unknown (See Comments)     unknown   • Meperidine Unknown (See Comments)     unknown   • Methadone Unknown (See Comments)     unknown   • Morphine Unknown (See Comments)     unknown   • Naloxone Unknown (See Comments)     unknown   • Propoxyphene Unknown (See Comments)     darvocet-n 100   • Sulfa Antibiotics Unknown (See Comments)     unknown   • Ubidecarenone GI Intolerance   • Zocor [Simvastatin] Unknown (See Comments)     unknown       Current Outpatient Medications:   •  apixaban (ELIQUIS) 2.5 MG tablet tablet, Take 1 tablet by mouth 2 (Two) Times a Day., Disp: 60 tablet, Rfl: 11  •  atorvastatin (LIPITOR) 40 MG tablet, TAKE 1 TABLET BY MOUTH EVERY DAY, Disp: 90 tablet, Rfl: 2  •  calcium carbonate-vitamin d (CALTRATE 600+D) 600-400 MG-UNIT per tablet, Take 1 tablet by mouth Daily., Disp: , Rfl:   •  carvedilol (Coreg) 6.25 MG tablet, Take 1 tablet by mouth 2 (Two) Times a Day With Meals., Disp: 180 tablet, Rfl: 3  •  furosemide (LASIX) 40 MG tablet, TAKE 1 TABLET BY MOUTH EVERY DAY,  "Disp: 90 tablet, Rfl: 3  •  gabapentin (NEURONTIN) 300 MG capsule, TAKE 1 CAPSULE BY MOUTH TWICE A DAY, Disp: 180 capsule, Rfl: 0  •  KLOR-CON 10 MEQ CR tablet, TAKE 1 TABLET BY MOUTH EVERY DAY, Disp: 90 tablet, Rfl: 3  •  levothyroxine (SYNTHROID, LEVOTHROID) 50 MCG tablet, Note chg dose to 50 mcg from 75, Disp: 90 tablet, Rfl: 3  •  liothyronine (CYTOMEL) 5 MCG tablet, TAKE 1 TABLET BY MOUTH EVERY DAY, Disp: 90 tablet, Rfl: 1  •  Multiple Vitamins-Minerals (CENTRUM SILVER PO), Take 1 tablet by mouth Daily., Disp: , Rfl:   •  O2 (OXYGEN), Inhale 2 L/min Every Night. Takes 3 l every night, Disp: , Rfl:   •  pantoprazole (PROTONIX) 40 MG EC tablet, Take 1 tablet by mouth Daily., Disp: 90 tablet, Rfl: 1  •  polycarbophil (calcium polycarbophil) 625 MG tablet tablet, Take 625 mg by mouth Daily., Disp: , Rfl:   •  Propylene Glycol (Systane Complete) 0.6 % solution, Apply 1 drop to eye(s) as directed by provider Daily As Needed., Disp: , Rfl:   •  trimethoprim (TRIMPEX) 100 MG tablet, Take 1 tablet by mouth Daily., Disp: 90 tablet, Rfl: 3    Current Facility-Administered Medications:   •  cefTRIAXone (ROCEPHIN) injection 2 g, 2 g, Intramuscular, Once, Kira Carvalho APRN    Review of Systems  /74 (BP Location: Left arm, Patient Position: Sitting, Cuff Size: Adult)   Pulse 68   Temp 98.4 °F (36.9 °C) (Temporal)   Ht 165.1 cm (65\")   Wt 57.2 kg (126 lb)   BMI 20.97 kg/m²     Physical Exam  Vitals reviewed.   Constitutional:       Appearance: She is well-developed.   Cardiovascular:      Comments: Slight improvement compared with exam 2 days ago, circumferential edema and erythema present, 3+ pitting.  Anterior shin ulcerations are unchanged.  Pulmonary:      Effort: Pulmonary effort is normal.   Musculoskeletal:      Right lower leg: 3+ Edema present.      Left lower leg: No edema.   Skin:     General: Skin is warm and dry.   Neurological:      Mental Status: She is alert.   Psychiatric:         Behavior: " Behavior normal.         Procedures    Results Review:  I reviewed the patient's new clinical results.    PHQ-9 Total Score:      Assessment/Plan:  Diagnoses and all orders for this visit:    1. Cellulitis of right lower extremity (Primary)  Comments:  Stop doxycycline.  Rocephin 2 g IM today, continue cleansing with vinegar water solution, apply nonstick bandages and wear compression sock.  Elevate is much as  Orders:  -     cefTRIAXone (ROCEPHIN) injection 2 g       Patient Instructions   Cellulitis, Adult    Cellulitis is a skin infection. The infected area is often warm, red, swollen, and sore. It occurs most often in the arms and lower legs. It is very important to get treated for this condition.  What are the causes?  This condition is caused by bacteria. The bacteria enter through a break in the skin, such as a cut, burn, insect bite, open sore, or crack.  What increases the risk?  This condition is more likely to occur in people who:  · Have a weak body defense system (immune system).  · Have open cuts, burns, bites, or scrapes on the skin.  · Are older than 60 years of age.  · Have a blood sugar problem (diabetes).  · Have a long-lasting (chronic) liver disease (cirrhosis) or kidney disease.  · Are very overweight (obese).  · Have a skin problem, such as:  ? Itchy rash (eczema).  ? Slow movement of blood in the veins (venous stasis).  ? Fluid buildup below the skin (edema).  · Have been treated with high-energy rays (radiation).  · Use IV drugs.  What are the signs or symptoms?  Symptoms of this condition include:  · Skin that is:  ? Red.  ? Streaking.  ? Spotting.  ? Swollen.  ? Sore or painful when you touch it.  ? Warm.  · A fever.  · Chills.  · Blisters.  How is this diagnosed?  This condition is diagnosed based on:  · Medical history.  · Physical exam.  · Blood tests.  · Imaging tests.  How is this treated?  Treatment for this condition may include:  · Medicines to treat infections or  allergies.  · Home care, such as:  ? Rest.  ? Placing cold or warm cloths (compresses) on the skin.  · Hospital care, if the condition is very bad.  Follow these instructions at home:  Medicines  · Take over-the-counter and prescription medicines only as told by your doctor.  · If you were prescribed an antibiotic medicine, take it as told by your doctor. Do not stop taking it even if you start to feel better.  General instructions    · Drink enough fluid to keep your pee (urine) pale yellow.  · Do not touch or rub the infected area.  · Raise (elevate) the infected area above the level of your heart while you are sitting or lying down.  · Place cold or warm cloths on the area as told by your doctor.  · Keep all follow-up visits as told by your doctor. This is important.    Contact a doctor if:  · You have a fever.  · You do not start to get better after 1-2 days of treatment.  · Your bone or joint under the infected area starts to hurt after the skin has healed.  · Your infection comes back. This can happen in the same area or another area.  · You have a swollen bump in the area.  · You have new symptoms.  · You feel ill and have muscle aches and pains.  Get help right away if:  · Your symptoms get worse.  · You feel very sleepy.  · You throw up (vomit) or have watery poop (diarrhea) for a long time.  · You see red streaks coming from the area.  · Your red area gets larger.  · Your red area turns dark in color.  These symptoms may represent a serious problem that is an emergency. Do not wait to see if the symptoms will go away. Get medical help right away. Call your local emergency services (911 in the [place] Do not drive yourself to the hospital.  Summary  · Cellulitis is a skin infection. The area is often warm, red, swollen, and sore.  · This condition is treated with medicines, rest, and cold and warm cloths.  · Take all medicines only as told by your doctor.  · Tell your doctor if symptoms do not start to get  better after 1-2 days of treatment.  This information is not intended to replace advice given to you by your health care provider. Make sure you discuss any questions you have with your health care provider.  Document Revised: 05/09/2019 Document Reviewed: 05/09/2019  [employer] Patient Education © 2021 [employer]      Plan of care reviewed with patient at the conclusion of today's visit. Education was provided regarding diagnosis, management and any prescribed or recommended OTC medications.  Patient verbalizes understanding of and agreement with management plan.    Return in about 5 days (around 1/17/2022) for Recheck JH or , Recheck.    Dictated Utilizing Dragon Dictation.    YESENIA Fernandez

## 2022-01-12 NOTE — TELEPHONE ENCOUNTER
Patient's daughter Tamiko returned phone call and she was given message regarding labs.  She verbalized understanding.

## 2022-01-17 NOTE — PROGRESS NOTES
Amparo Valdes  2/3/1930  [id number]  Patient Care Team:  Nj Mckeon MD as PCP - General  Nj Mckeon MD as PCP - Family Medicine  Harjeet Martinez MD as Consulting Physician (Interventional Cardiology)  Dwight De Souza MD as Consulting Physician (Urology)    Amparo Valdes is a pleasant 89 y.o. female who presents for evaluation of Cellulitis    This patient is accompanied by their daughter who contributes to the history of their care.  Chief Complaint   Patient presents with   • Cellulitis       HPI:   RLE cellulitis recheck.  Less erythema and swelling, no new skin lesions. reports she continues to have some weeping on her bandagecontiues cleansing with vinegar solution and using medihoney.  Unfortunately, weight is up 4 lbs and she has increased swelling above compression sock level in posterior and anterior right thigh.  No fever, night sweats. Continues on furosemide 40mg daily.  Has metolazone at home.   No notable left leg swelling.   Past Medical History:   Diagnosis Date   • Arthritis    • Cancer, skin, squamous cell 12/27/2012   • Colon polyps 1987    clear 1994   • COPD (chronic obstructive pulmonary disease) ([facility])    • Family history of coronary artery disease    • Family history of hyperlipidemia    • Family history of hypertension    • Family history of stroke    • Femoral hernia of right side 12/13/2011   • Hyperlipidemia    • Hypertension    • Hypothyroidism    • Osteoarthritis    • Osteoporosis    • Pneumonia    • Rectal fistula     repair   • SBO (small bowel obstruction) ([facility]) 09/13/2012    Had right inguinal hernia repair   • Sepsis ([facility]) 04/2014    - Drug therapy    • Stress incontinence     A and P  repair   • Thyroid disease      Past Surgical History:   Procedure Laterality Date   • ANAL FISTULA REPAIR     • CARDIAC SURGERY  10/2011    stents placed   • CATARACT EXTRACTION     • CORONARY ANGIOPLASTY WITH STENT PLACEMENT  11/2011    x3   • HYSTERECTOMY     • INGUINAL HERNIA  REPAIR     • OTHER SURGICAL HISTORY      thyroid uptake   • VAGINAL VAULT SUSPENSION       Family History   Problem Relation Age of Onset   • Stroke Mother    • Heart disease Father    • Hypertension Brother    • Diabetes Brother    • Diabetes Daughter    • Heart disease Daughter    • Hypertension Son    • Hypertension Other    • Coronary artery disease Other    • Stroke Other    • Hyperlipidemia Other    • Other Other         ichthyosis     Social History     Tobacco Use   Smoking Status Former Smoker   Smokeless Tobacco Former User   Tobacco Comment    quit at approx age 60     Allergies   Allergen Reactions   • Acetaminophen Unknown (See Comments)     Darvocet- N100   • Baycol [Cerivastatin] Unknown (See Comments)     unknown   • Ciprofloxacin Unknown (See Comments)     Unknown   • Ciprofloxacin Hcl Unknown (See Comments)     unknown   • Codeine Unknown (See Comments)     unknown   • Fosamax [Alendronate Sodium] Unknown (See Comments)     unknown   • Meperidine Unknown (See Comments)     unknown   • Methadone Unknown (See Comments)     unknown   • Morphine Unknown (See Comments)     unknown   • Naloxone Unknown (See Comments)     unknown   • Propoxyphene Unknown (See Comments)     darvocet-n 100   • Sulfa Antibiotics Unknown (See Comments)     unknown   • Ubidecarenone GI Intolerance   • Zocor [Simvastatin] Unknown (See Comments)     unknown       Current Outpatient Medications:   •  apixaban (ELIQUIS) 2.5 MG tablet tablet, Take 1 tablet by mouth 2 (Two) Times a Day., Disp: 60 tablet, Rfl: 11  •  atorvastatin (LIPITOR) 40 MG tablet, TAKE 1 TABLET BY MOUTH EVERY DAY, Disp: 90 tablet, Rfl: 2  •  calcium carbonate-vitamin d (CALTRATE 600+D) 600-400 MG-UNIT per tablet, Take 1 tablet by mouth Daily., Disp: , Rfl:   •  carvedilol (Coreg) 6.25 MG tablet, Take 1 tablet by mouth 2 (Two) Times a Day With Meals., Disp: 180 tablet, Rfl: 3  •  furosemide (LASIX) 40 MG tablet, TAKE 1 TABLET BY MOUTH EVERY DAY, Disp: 90 tablet,  "Rfl: 3  •  gabapentin (NEURONTIN) 300 MG capsule, TAKE 1 CAPSULE BY MOUTH TWICE A DAY, Disp: 180 capsule, Rfl: 0  •  KLOR-CON 10 MEQ CR tablet, TAKE 1 TABLET BY MOUTH EVERY DAY, Disp: 90 tablet, Rfl: 3  •  levothyroxine (SYNTHROID, LEVOTHROID) 50 MCG tablet, Note chg dose to 50 mcg from 75, Disp: 90 tablet, Rfl: 3  •  liothyronine (CYTOMEL) 5 MCG tablet, TAKE 1 TABLET BY MOUTH EVERY DAY, Disp: 90 tablet, Rfl: 1  •  Multiple Vitamins-Minerals (CENTRUM SILVER PO), Take 1 tablet by mouth Daily., Disp: , Rfl:   •  O2 (OXYGEN), Inhale 2 L/min Every Night. Takes 3 l every night, Disp: , Rfl:   •  pantoprazole (PROTONIX) 40 MG EC tablet, Take 1 tablet by mouth Daily., Disp: 90 tablet, Rfl: 1  •  polycarbophil (calcium polycarbophil) 625 MG tablet tablet, Take 625 mg by mouth Daily., Disp: , Rfl:   •  Propylene Glycol (Systane Complete) 0.6 % solution, Apply 1 drop to eye(s) as directed by provider Daily As Needed., Disp: , Rfl:   •  trimethoprim (TRIMPEX) 100 MG tablet, Take 1 tablet by mouth Daily., Disp: 90 tablet, Rfl: 3  •  metOLazone (ZAROXOLYN) 2.5 MG tablet, Take 1 tablet by mouth Daily., Disp: 3 tablet, Rfl: 0  No current facility-administered medications for this visit.    Review of Systems  /64 (BP Location: Left arm, Patient Position: Sitting, Cuff Size: Adult)   Pulse 68   Temp 97.1 °F (36.2 °C) (Temporal)   Ht 165.1 cm (65\")   Wt 59 kg (130 lb)   BMI 21.63 kg/m²     Physical Exam  Constitutional:       Appearance: She is well-developed.   HENT:      Head: Normocephalic and atraumatic.      Comments: *wearing mask  Eyes:      Conjunctiva/sclera: Conjunctivae normal.      Pupils: Pupils are equal, round, and reactive to light.   Cardiovascular:      Rate and Rhythm: Normal rate and regular rhythm.      Pulses: Normal pulses.      Heart sounds: Normal heart sounds.      Comments: Small ulcerations anterior shin and posterior calf are healing, now new lesions, no drainge     Right upper thigh edema new " from last week's visit  Pulmonary:      Effort: Pulmonary effort is normal.      Breath sounds: Normal breath sounds.   Musculoskeletal:         General: Normal range of motion.      Cervical back: Normal range of motion and neck supple.      Right lower le+ Edema present.      Left lower leg: No edema.   Skin:     General: Skin is warm and dry.   Neurological:      Mental Status: She is alert and oriented to person, place, and time.   Psychiatric:         Mood and Affect: Mood normal.         Behavior: Behavior normal.         Thought Content: Thought content normal.         Judgment: Judgment normal.         Procedures    Results Review:  None    PHQ-9 Total Score:      Assessment/Plan:  Diagnoses and all orders for this visit:    1. Cellulitis of right lower extremity (Primary)  Comments:  improving, rocephin 1 g today, continue daily cleansing, medihoney to any areas of breakdown  Orders:  -     cefTRIAXone (ROCEPHIN) injection 1 g    2. Peripheral edema  Comments:  add metolazone 2.5mg daily x 3 days, call Thursday with update    Other orders  -     metOLazone (ZAROXOLYN) 2.5 MG tablet; Take 1 tablet by mouth Daily.  Dispense: 3 tablet; Refill: 0       There are no Patient Instructions on file for this visit.  Plan of care reviewed with patient at the conclusion of today's visit. Education was provided regarding diagnosis, management and any prescribed or recommended OTC medications.  Patient verbalizes understanding of and agreement with management plan.    No follow-ups on file.    Dictated Utilizing Dragon Dictation.    YESENIA Fernandez

## 2022-02-08 NOTE — ASSESSMENT & PLAN NOTE
Essential hypertension with current blood pressure 136/84 on carvedilol 6.25 mg twice daily Lasix 40 mg daily continue therapy

## 2022-02-08 NOTE — PROGRESS NOTES
"[facility]     Amparo Valdes  2/3/1930   [id number]      Patient Care Team:  Nj Mckeon MD as PCP - General  Nj Mckeon MD as PCP - Family Medicine  Harjeet Martinez MD as Consulting Physician (Interventional Cardiology)  Dwight De Souza MD as Consulting Physician (Urology)    Chief Complaint::   Chief Complaint   Patient presents with   • Leg Swelling     bilateral with increased SOB x 2 weeks.  Also with edema in abdomen, buttock            HPI  The patient has consented to being recorded using [place].    The patient is a 89-year-old female complaining of bilateral leg swelling and shortness of breath for the past 2 weeks, abdomen and buttocks. With a past history of congestive heart failure and chronic edema with frequent exacerbations. She is accompanied by her daughter.    The patient states that she is swelling to point that she can get her balance in her lower extremities. She states that she does not take Valtrex alone. She states that she has not tried 5 days in a row like she had to do once. She states that she has taken some of the stronger naltrexone and [place] was working with her. She states that she has not had any metolazone since 02/04/2022; she took it as needed. She states that she has swelling in her bilateral upper thighs, abdomen, and buttocks. She states that she does not weigh herself every day, denies having a scale. The patient reports that she goes to the front of the building where the restroom is located, to be weighed. She does have an \"old-fashioned\" scale at home, but she needs help with her balance in order to use it. She states that she walks 100 to 200 steps to get to the scale.  The patient reports that she had 1 episode of dizziness, and she does get shortness of breath after execration. She sleeps a lot during the day, but keeps her legs elevated. The patient wakes up \"a lot\" during the night, but notes it has been going on consistently. She states that " "she passed fluid in the mornings when she had the diuretic and thyroid medicine. The metolazone makes her have a bowel movements a lot. She states that most of the fluid seems to go into her bowel rather than coming out. The patient reports that she goes has bowel movement most mornings at 7:30 AM and the color is perfect.     She confirms taking her Lasix every morning. She take her vitamins and supplements at lunch. .  Her blood pressure is 160/80 mmHg today.     The patient's home has had 3 cases of COVID since 10/2021.    Her right leg was \"weeping\" in 01/2022, but has since gotten better reports the daughter.     The patient reports that on Friday when she took the metolazone, her kidneys did not move a lot, but they do \"pour\" a lot. She notes that there will also be long periods of times when she does not use the restroom.  She states that she rarely will be able to do anything other than maybe she has a bowel movement. She states that she takes the furosemide in the morning at 8:30 AM.      Patient Active Problem List   Diagnosis   • Irritable bowel syndrome with constipation and diarrhea   • Chronic cystitis   • Benign essential hypertension   • ASHD (arteriosclerotic heart disease)   • Moderate COPD (chronic obstructive pulmonary disease) ([facility])   • Acquired hypothyroidism   • Other specified anemias   • Primary osteoarthritis involving multiple joints   • Ischemic heart disease   • Gastroesophageal reflux disease without esophagitis   • Arteriosclerosis of coronary artery bypass graft   • Asthenia   • Chronic UTI   • CHF (congestive heart failure) ([facility])   • Femoral hernia   • First degree atrioventricular block   • Heart disorder   • Herpes zoster   • Hyponatremia   • Iron deficiency anemia   • Low blood pressure   • Mixed hyperlipidemia   • Osteoarthritis   • Parasomnia   • Urethritis   • Paroxysmal atrial fibrillation ([facility])   • Acute on chronic respiratory failure with hypoxia ([facility])   • Lumbar pain   • " Medicare annual wellness visit, subsequent   • COVID-19 virus infection        Past Medical History:   Diagnosis Date   • Arthritis    • Cancer, skin, squamous cell 12/27/2012   • Colon polyps 1987    clear 1994   • COPD (chronic obstructive pulmonary disease) ([facility])    • Family history of coronary artery disease    • Family history of hyperlipidemia    • Family history of hypertension    • Family history of stroke    • Femoral hernia of right side 12/13/2011   • Hyperlipidemia    • Hypertension    • Hypothyroidism    • Osteoarthritis    • Osteoporosis    • Pneumonia    • Rectal fistula     repair   • SBO (small bowel obstruction) ([facility]) 09/13/2012    Had right inguinal hernia repair   • Sepsis ([facility]) 04/2014    - Drug therapy    • Stress incontinence     A and P  repair   • Thyroid disease        Past Surgical History:   Procedure Laterality Date   • ANAL FISTULA REPAIR     • CARDIAC SURGERY  10/2011    stents placed   • CATARACT EXTRACTION     • CORONARY ANGIOPLASTY WITH STENT PLACEMENT  11/2011    x3   • HYSTERECTOMY     • INGUINAL HERNIA REPAIR     • OTHER SURGICAL HISTORY      thyroid uptake   • VAGINAL VAULT SUSPENSION         Family History   Problem Relation Age of Onset   • Stroke Mother    • Heart disease Father    • Hypertension Brother    • Diabetes Brother    • Diabetes Daughter    • Heart disease Daughter    • Hypertension Son    • Hypertension Other    • Coronary artery disease Other    • Stroke Other    • Hyperlipidemia Other    • Other Other         ichthyosis       Social History     Socioeconomic History   • Marital status:    Tobacco Use   • Smoking status: Former Smoker   • Smokeless tobacco: Former User   • Tobacco comment: quit at approx age 60   Substance and Sexual Activity   • Alcohol use: Yes     Comment: rarely   • Drug use: No   • Sexual activity: Defer       Allergies   Allergen Reactions   • Acetaminophen Unknown (See Comments)     Darvocet- N100   • Baycol [Cerivastatin] Unknown  "(See Comments)     unknown   • Ciprofloxacin Unknown (See Comments)     Unknown   • Ciprofloxacin Hcl Unknown (See Comments)     unknown   • Codeine Unknown (See Comments)     unknown   • Fosamax [Alendronate Sodium] Unknown (See Comments)     unknown   • Meperidine Unknown (See Comments)     unknown   • Methadone Unknown (See Comments)     unknown   • Morphine Unknown (See Comments)     unknown   • Naloxone Unknown (See Comments)     unknown   • Propoxyphene Unknown (See Comments)     darvocet-n 100   • Sulfa Antibiotics Unknown (See Comments)     unknown   • Ubidecarenone GI Intolerance   • Zocor [Simvastatin] Unknown (See Comments)     unknown       Review of Systems     HEENT: Denies any hearing changes, eyesight changes, no headache or dizziness  NECK: Denies any pain, stiffness, swelling or dysphagia  CHEST: Denies cough or wheeze or recent lung infections  CARDIAC: Denies chest pain, pressure or palpitations  ABD: Denies nausea, vomiting or abdominal pain  : Denies dysuria  NEURO: Denies syncope, concussion, neuropathy  PSYCH: Denies any stress  EXTREM: Denies arthritic changes myalgia or arthralgia, edema in lower extremities and buttocks.  SKIN: Denies any rash    Vital Signs  Vitals:    02/08/22 1451   BP: 136/84   BP Location: Right arm   Patient Position: Sitting   Cuff Size: Adult   Pulse: 60   Temp: 98.2 °F (36.8 °C)   TempSrc: Infrared   SpO2: 93%  Comment: after ambulating to room wearing O2 at 3L/nc   Weight: 62.5 kg (137 lb 12.8 oz)   Height: 165.1 cm (65\")   PainSc: 0-No pain     Body mass index is 22.93 kg/m².  Patient's Body mass index is 22.93 kg/m². indicating that she is within normal range (BMI 18.5-24.9). No BMI management plan needed..     Advance Care Planning   ACP discussion was held with the patient during this visit. Patient has an advance directive in EMR which is still valid.        Current Outpatient Medications:   •  apixaban (ELIQUIS) 2.5 MG tablet tablet, Take 1 tablet by " mouth 2 (Two) Times a Day., Disp: 60 tablet, Rfl: 11  •  atorvastatin (LIPITOR) 40 MG tablet, TAKE 1 TABLET BY MOUTH EVERY DAY, Disp: 90 tablet, Rfl: 2  •  calcium carbonate-vitamin d (CALTRATE 600+D) 600-400 MG-UNIT per tablet, Take 1 tablet by mouth Daily., Disp: , Rfl:   •  carvedilol (Coreg) 6.25 MG tablet, Take 1 tablet by mouth 2 (Two) Times a Day With Meals., Disp: 180 tablet, Rfl: 3  •  furosemide (LASIX) 40 MG tablet, TAKE 1 TABLET BY MOUTH EVERY DAY, Disp: 90 tablet, Rfl: 3  •  gabapentin (NEURONTIN) 300 MG capsule, TAKE 1 CAPSULE BY MOUTH TWICE A DAY, Disp: 180 capsule, Rfl: 0  •  KLOR-CON 10 MEQ CR tablet, TAKE 1 TABLET BY MOUTH EVERY DAY, Disp: 90 tablet, Rfl: 3  •  levothyroxine (SYNTHROID, LEVOTHROID) 50 MCG tablet, Note chg dose to 50 mcg from 75, Disp: 90 tablet, Rfl: 3  •  liothyronine (CYTOMEL) 5 MCG tablet, TAKE 1 TABLET BY MOUTH EVERY DAY, Disp: 90 tablet, Rfl: 1  •  metOLazone (ZAROXOLYN) 2.5 MG tablet, Take 1 tablet by mouth Daily., Disp: 15 tablet, Rfl: 1  •  Multiple Vitamins-Minerals (CENTRUM SILVER PO), Take 1 tablet by mouth Daily., Disp: , Rfl:   •  O2 (OXYGEN), Inhale 2 L/min Every Night. Takes 3 l every night, Disp: , Rfl:   •  pantoprazole (PROTONIX) 40 MG EC tablet, Take 1 tablet by mouth Daily., Disp: 90 tablet, Rfl: 1  •  polycarbophil (calcium polycarbophil) 625 MG tablet tablet, Take 625 mg by mouth Daily., Disp: , Rfl:   •  Propylene Glycol (Systane Complete) 0.6 % solution, Apply 1 drop to eye(s) as directed by provider Daily As Needed., Disp: , Rfl:   •  trimethoprim (TRIMPEX) 100 MG tablet, Take 1 tablet by mouth Daily., Disp: 90 tablet, Rfl: 3    Physical Exam     ACE III MINI         HEENT: Pupils equal reactive ENT clear, no facial asymmetry, pharynx is clear  NECK: No masses bruit or thyromegaly  CHEST: crackles and wheeze in lung bases l  CARDIAC: Slightly irregular, loud murmur  ABD: Liver spleen normal, good bowel sounds, nontender, fluid in abdomen.  :  Deferred  NEURO: Intact  PSYCH: Normal  EXTREM: No significant arthritic changes, 1+ edema in the left lower leg and about 2 to 3+ in the right lower leg. Denting on the right. Has bilateral thigh edema  SKIN: Clear     Results Review:    No results found for this or any previous visit (from the past 672 hour(s)).  Procedures    Medication Review: Medications reviewed and noted    Patient wellness counseling  Exercise: Encouraged rest  Diet: Encouraged low-salt diet  Screening:  Social History     Socioeconomic History   • Marital status:    Tobacco Use   • Smoking status: Former Smoker   • Smokeless tobacco: Former User   • Tobacco comment: quit at approx age 60   Substance and Sexual Activity   • Alcohol use: Yes     Comment: rarely   • Drug use: No   • Sexual activity: Defer        Assessment/Plan:    Problem List Items Addressed This Visit        Cardiac and Vasculature    Benign essential hypertension    Overview     History of essential hypertension with current blood pressure 120/70 on carvedilol 6.25 twice daily Lasix 20 mg daily blood pressure is in the low normal range the patient is asymptomatic denying headache dizzy or lightheadedness and no falls we will continue current therapy.         Current Assessment & Plan       Essential hypertension with current blood pressure 136/84 on carvedilol 6.25 mg twice daily Lasix 40 mg daily continue therapy         Relevant Medications    carvedilol (Coreg) 6.25 MG tablet    furosemide (LASIX) 40 MG tablet    metOLazone (ZAROXOLYN) 2.5 MG tablet    CHF (congestive heart failure) (HCC) - Primary    Overview     History of chronic heart failure improved on carvedilol Eliquis Lipitor Lasix and O2         Current Assessment & Plan       Recurrent congestive heart failure with bilateral leg edema and reduced breath sounds with symptoms of shortness 8 days breath mild PND on carvedilol 6.25 mg twice daily and Lasix 40 mg daily suggest resuming metolazone 2.5 mg  daily and seeing the patient back in 7-8 days         Relevant Medications    carvedilol (Coreg) 6.25 MG tablet    Mixed hyperlipidemia    Overview     History of mixed hyperlipidemia on atorvastatin 40 mg daily denies myalgia arthralgia         Current Assessment & Plan       Mixed hyperlipidemia on atorvastatin 40 mg daily denies myalgia arthralgia continue therapy         Relevant Medications    atorvastatin (LIPITOR) 40 MG tablet    Paroxysmal atrial fibrillation (HCC)    Overview     Atrial fibrillation noted on EKG symptomatic on May 20, 2019 refer to cardiology underwent cardioversion is on Eliquis 5 mg twice daily Coreg 6.25 twice daily with current heart rate of 60/min         Current Assessment & Plan     Paroxysmal atrial fibrillation with current sinus rhythm with heart rate of 60 and blood pressure 136/84 on Eliquis 2.5 mg twice daily carvedilol 6.25 twice daily and Lasix 40 mg daily         Relevant Medications    carvedilol (Coreg) 6.25 MG tablet       Endocrine and Metabolic    Acquired hypothyroidism    Overview     History of hypothyroidism on Synthroid 75 MCG daily patient's TSH is low will reduce the Synthroid to 50 MCG daily and continue the Cytomel at 5 mg daily         Current Assessment & Plan     Hypothyroidism on levothyroxine 50 MCG daily and Cytomel 5 MCG daily continue therapy         Relevant Medications    levothyroxine (SYNTHROID, LEVOTHROID) 50 MCG tablet    liothyronine (CYTOMEL) 5 MCG tablet    carvedilol (Coreg) 6.25 MG tablet           Patient Instructions   Weight is 137 pounds and O2 saturations 86% on 2 L  Continue Lasix of 40 mg every morning  Add metolazone 2.5 mg daily  Return visit in 1 week  Weigh on the stand-up scale at the personal-care home every other day and write down weight  Let us know if not improving ,  return visit in 1 week with a CBC and a CMP         Plan of care reviewed with patient at the conclusion of today's visit. Education was provided regarding  diagnosis, management, and any prescribed or recommended OTC medications.Patient verbalizes understanding of and agreement with management plan.         Nj Mckeon MD      Note: Part of this note may be an electronic transcription/translation of spoken language to printed text using the Dragon Dictation system.

## 2022-02-08 NOTE — PATIENT INSTRUCTIONS
Weight is 137 pounds and O2 saturations 86% on 2 L  Continue Lasix of 40 mg every morning  Add metolazone 2.5 mg daily  Return visit in 1 week  Weigh on the stand-up scale at the personal-care home every other day and write down weight  Let us know if not improving ,  return visit in 1 week with a CBC and a CMP

## 2022-02-08 NOTE — ASSESSMENT & PLAN NOTE
Recurrent congestive heart failure with bilateral leg edema and reduced breath sounds with symptoms of shortness 8 days breath mild PND on carvedilol 6.25 mg twice daily and Lasix 40 mg daily suggest resuming metolazone 2.5 mg daily and seeing the patient back in 7-8 days

## 2022-02-15 NOTE — PROGRESS NOTES
"[facility]     Amparo Valdes  2/3/1930   [id number]      Patient Care Team:  Nj Mckeon MD as PCP - General  Nj Mckeon MD as PCP - Family Medicine  Harjeet Martinez MD as Consulting Physician (Interventional Cardiology)  Dwight De Souza MD as Consulting Physician (Urology)    Chief Complaint::   Chief Complaint   Patient presents with   • Congestive Heart Failure     follow up   • leg redness     right inner upper thigh            HPI  The patient has consented to being recorded using [place].    Amparo Valdes is a 89-year-old female who presents for congestive heart failure follow-up and leg redness, right inner upper thigh.     The patient reports she is feeling pretty good. She reports she bruised her right upper medial thigh area by \"squeezing it\" over a month ago. She denies the area itches and reports it is mildly tender. The patient reports it was more pronounced and swollen prior to today. The patient reports pain in her right foot and toes, but states she is not in pain at this time. She reports she did not wear her usual stockings in case her feet would be examined.    The patient reports she has shortness of breath, but states it is improving with fluid loss. She reports that her stomach \"troubles her some.\" She reports her abdomen is less swollen but not down. The patient adds she thinks it \"went out of her buttocks.\" The patient reports increased thirst and would like to know her blood glucose level soon. She confirms a minor inspiratory wheeze is present and states she \"can get some of it up but not much.\" The adult female states the patient stayed in all week but would love to go home if she can. The adult female confirms she will take the patient for lab work in 2 weeks prior to her follow-up appointment.     The patient is accompanied by an adult female daughter who contributes to her history of care.      Patient Active Problem List   Diagnosis   • Irritable bowel syndrome " with constipation and diarrhea   • Chronic cystitis   • Benign essential hypertension   • ASHD (arteriosclerotic heart disease)   • Moderate COPD (chronic obstructive pulmonary disease) (Formerly [facility])   • Acquired hypothyroidism   • Other specified anemias   • Primary osteoarthritis involving multiple joints   • Ischemic heart disease   • Gastroesophageal reflux disease without esophagitis   • Arteriosclerosis of coronary artery bypass graft   • Asthenia   • Chronic UTI   • CHF (congestive heart failure) (Formerly [facility])   • Femoral hernia   • First degree atrioventricular block   • Heart disorder   • Herpes zoster   • Hyponatremia   • Iron deficiency anemia   • Low blood pressure   • Mixed hyperlipidemia   • Osteoarthritis   • Parasomnia   • Urethritis   • Paroxysmal atrial fibrillation (Formerly [facility])   • Acute on chronic respiratory failure with hypoxia (Formerly [facility])   • Lumbar pain   • Medicare annual wellness visit, subsequent   • COVID-19 virus infection        Past Medical History:   Diagnosis Date   • Arthritis    • Cancer, skin, squamous cell 12/27/2012   • Colon polyps 1987    clear 1994   • COPD (chronic obstructive pulmonary disease) (Formerly [facility])    • Family history of coronary artery disease    • Family history of hyperlipidemia    • Family history of hypertension    • Family history of stroke    • Femoral hernia of right side 12/13/2011   • Hyperlipidemia    • Hypertension    • Hypothyroidism    • Osteoarthritis    • Osteoporosis    • Pneumonia    • Rectal fistula     repair   • SBO (small bowel obstruction) (Formerly [facility]) 09/13/2012    Had right inguinal hernia repair   • Sepsis (Formerly [facility]) 04/2014    - Drug therapy    • Stress incontinence     A and P  repair   • Thyroid disease        Past Surgical History:   Procedure Laterality Date   • ANAL FISTULA REPAIR     • CARDIAC SURGERY  10/2011    stents placed   • CATARACT EXTRACTION     • CORONARY ANGIOPLASTY WITH STENT PLACEMENT  11/2011    x3   • HYSTERECTOMY     • INGUINAL HERNIA REPAIR     • OTHER SURGICAL  HISTORY      thyroid uptake   • VAGINAL VAULT SUSPENSION         Family History   Problem Relation Age of Onset   • Stroke Mother    • Heart disease Father    • Hypertension Brother    • Diabetes Brother    • Diabetes Daughter    • Heart disease Daughter    • Hypertension Son    • Hypertension Other    • Coronary artery disease Other    • Stroke Other    • Hyperlipidemia Other    • Other Other         ichthyosis       Social History     Socioeconomic History   • Marital status:    Tobacco Use   • Smoking status: Former Smoker   • Smokeless tobacco: Former User   • Tobacco comment: quit at approx age 60   Substance and Sexual Activity   • Alcohol use: Yes     Comment: rarely   • Drug use: No   • Sexual activity: Defer       Allergies   Allergen Reactions   • Acetaminophen Unknown (See Comments)     Darvocet- N100   • Baycol [Cerivastatin] Unknown (See Comments)     unknown   • Ciprofloxacin Unknown (See Comments)     Unknown   • Ciprofloxacin Hcl Unknown (See Comments)     unknown   • Codeine Unknown (See Comments)     unknown   • Fosamax [Alendronate Sodium] Unknown (See Comments)     unknown   • Meperidine Unknown (See Comments)     unknown   • Methadone Unknown (See Comments)     unknown   • Morphine Unknown (See Comments)     unknown   • Naloxone Unknown (See Comments)     unknown   • Propoxyphene Unknown (See Comments)     darvocet-n 100   • Sulfa Antibiotics Unknown (See Comments)     unknown   • Ubidecarenone GI Intolerance   • Zocor [Simvastatin] Unknown (See Comments)     unknown       Review of Systems     HEENT: Denies any hearing changes, eyesight changes, no headache or dizziness, reports increased thirst  NECK: Denies any pain, stiffness, swelling or dysphagia  CHEST: Denies cough or wheeze or recent lung infections, reports shortness of breath that is improving with fluid loss  CARDIAC: Denies chest pain, pressure or palpitations  ABD: Denies nausea, vomiting or abdominal pain, reports  "occasional stomach \"trouble\", reports abdomen is less swollen  : Denies dysuria  NEURO: Denies syncope, concussion, neuropathy  PSYCH: Denies any stress  EXTREM: Denies arthritic changes myalgia or arthralgia, reports she bruised her right upper medial thigh area, reports prior pain in her right foot and metatarsals but denies pain today  SKIN: Denies any rash    Vital Signs  Vitals:    02/15/22 1457   BP: 106/60   BP Location: Left arm   Patient Position: Sitting   Cuff Size: Adult   Pulse: 74   Temp: 98.2 °F (36.8 °C)   TempSrc: Infrared   SpO2: 98%  Comment: with O2 at 3L.  After ambulating to the room   Weight: 59.2 kg (130 lb 9.6 oz)   Height: 165.1 cm (65\")   PainSc: 0-No pain     Body mass index is 21.73 kg/m².  Patient's Body mass index is 21.73 kg/m². indicating that she is within normal range (BMI 18.5-24.9). No BMI management plan needed..     Advance Care Planning   ACP discussion was held with the patient during this visit. Patient has an advance directive in EMR which is still valid.        Current Outpatient Medications:   •  apixaban (ELIQUIS) 2.5 MG tablet tablet, Take 1 tablet by mouth 2 (Two) Times a Day., Disp: 60 tablet, Rfl: 11  •  atorvastatin (LIPITOR) 40 MG tablet, TAKE 1 TABLET BY MOUTH EVERY DAY, Disp: 90 tablet, Rfl: 2  •  calcium carbonate-vitamin d (CALTRATE 600+D) 600-400 MG-UNIT per tablet, Take 1 tablet by mouth Daily., Disp: , Rfl:   •  carvedilol (Coreg) 6.25 MG tablet, Take 1 tablet by mouth 2 (Two) Times a Day With Meals., Disp: 180 tablet, Rfl: 3  •  furosemide (LASIX) 40 MG tablet, TAKE 1 TABLET BY MOUTH EVERY DAY, Disp: 90 tablet, Rfl: 3  •  gabapentin (NEURONTIN) 300 MG capsule, TAKE 1 CAPSULE BY MOUTH TWICE A DAY, Disp: 180 capsule, Rfl: 0  •  KLOR-CON 10 MEQ CR tablet, TAKE 1 TABLET BY MOUTH EVERY DAY, Disp: 90 tablet, Rfl: 3  •  levothyroxine (SYNTHROID, LEVOTHROID) 50 MCG tablet, Note chg dose to 50 mcg from 75, Disp: 90 tablet, Rfl: 3  •  liothyronine (CYTOMEL) 5 MCG " tablet, TAKE 1 TABLET BY MOUTH EVERY DAY, Disp: 90 tablet, Rfl: 1  •  metOLazone (ZAROXOLYN) 2.5 MG tablet, Take 1 tablet 2.5 mg Monday Wednesday Friday, Disp: 15 tablet, Rfl: 1  •  Multiple Vitamins-Minerals (CENTRUM SILVER PO), Take 1 tablet by mouth Daily., Disp: , Rfl:   •  O2 (OXYGEN), Inhale 2 L/min Every Night. Takes 3 l every night, Disp: , Rfl:   •  pantoprazole (PROTONIX) 40 MG EC tablet, Take 1 tablet by mouth Daily., Disp: 90 tablet, Rfl: 1  •  polycarbophil (calcium polycarbophil) 625 MG tablet tablet, Take 625 mg by mouth Daily., Disp: , Rfl:   •  Propylene Glycol (Systane Complete) 0.6 % solution, Apply 1 drop to eye(s) as directed by provider Daily As Needed., Disp: , Rfl:   •  trimethoprim (TRIMPEX) 100 MG tablet, Take 1 tablet by mouth Daily., Disp: 90 tablet, Rfl: 3    Physical Exam     ACE III MINI         HEENT: Pupils equal reactive ENT clear, no facial asymmetry, pharynx is clear  NECK: No masses bruit or thyromegaly  CHEST: Clear without rales or rhonchi, minor inspiratory wheeze present  CARDIAC: Regular rhythm without gallop rub or click, blood pressure is 108/68 mmHg bilaterally, heart rate stable, murmur is unchanged  ABD: Liver spleen normal, good bowel sounds, nontender  : Deferred  NEURO: Intact  PSYCH: Normal  EXTREM: Left leg has much less edema, very mild. Right leg has about 1+ in the ankle and about 2+ in the calf. Upper medial thigh area of the right leg does not feel particularly warm and is erythematous with mild fluid accumulation.  SKIN: Clear     Results Review:    No results found for this or any previous visit (from the past 672 hour(s)).  Procedures    Medication Review: Medications reviewed and noted    Patient wellness counseling  Exercise: Patient has been depressed for the past 7 days and advised to gradual walking and may return to her home on a as needed basis  Diet: Encouraged low-salt diet  Screening: We will repeat previsit CMP in 2 weeks  Social History      Socioeconomic History   • Marital status:    Tobacco Use   • Smoking status: Former Smoker   • Smokeless tobacco: Former User   • Tobacco comment: quit at approx age 60   Substance and Sexual Activity   • Alcohol use: Yes     Comment: rarely   • Drug use: No   • Sexual activity: Defer        Assessment/Plan:    Problem List Items Addressed This Visit        Cardiac and Vasculature    Benign essential hypertension    Overview     History of essential hypertension with current blood pressure 120/70 on carvedilol 6.25 twice daily Lasix 20 mg daily blood pressure is in the low normal range the patient is asymptomatic denying headache dizzy or lightheadedness and no falls we will continue current therapy.         Current Assessment & Plan       Essential hypertension with goal blood pressure 106/60 repeated 112/60 left and right sitting on Lasix 40 mg daily and recent metolazone 2.5 mg daily and carvedilol 6.25 mg twice daily  We will reduce metolazone to 2.5 mg Monday Wednesday Friday         Relevant Medications    carvedilol (Coreg) 6.25 MG tablet    furosemide (LASIX) 40 MG tablet    metOLazone (ZAROXOLYN) 2.5 MG tablet    Other Relevant Orders    Comprehensive Metabolic Panel    CHF (congestive heart failure) (HCC) - Primary    Overview     History of chronic heart failure improved on carvedilol Eliquis Lipitor Lasix and O2         Current Assessment & Plan       Recent exacerbation of heart failure with severe edema and metolazone 2.5 mg daily was added on February 8 the patient has had a 7 pound diuresis with marked improvement in her shortness of breath we will continue Lasix 40 mg daily Eliquis of 2.5 mg twice daily Lipitor 40 carvedilol 6.25 twice daily and continue metolazone but reduce dose of 2.5 mg Monday Wednesday Friday         Relevant Medications    carvedilol (Coreg) 6.25 MG tablet    Mixed hyperlipidemia    Overview     History of mixed hyperlipidemia on atorvastatin 40 mg daily denies  myalgia arthralgia         Current Assessment & Plan       Next hyperlipidemia on atorvastatin 40 mg daily denies myalgia arthralgia continue therapy         Relevant Medications    atorvastatin (LIPITOR) 40 MG tablet    Paroxysmal atrial fibrillation (HCC)    Overview     Atrial fibrillation noted on EKG symptomatic on May 20, 2019 refer to cardiology underwent cardioversion is on Eliquis 5 mg twice daily Coreg 6.25 twice daily with current heart rate of 60/min         Current Assessment & Plan     Paroxysmal atrial fibrillation on Eliquis 2.5 mg twice daily carvedilol 6.25 mg twice daily and Lasix 40 mg daily patient recently developed congestive heart failure with severe leg edema and was placed on metolazone 2.5 mg for the past 7 days the patient has had a 7 pound diuresis with improvement in O2 saturation of 98% on O2 3 L with weight down to 130 pounds with 7 pound weight loss the patient still has some thigh edema in bilateral knee medial aspect edema in lower leg edema but is markedly improved and will continue Lasix of 40 mg daily but reduce the metolazone to 2.5 mg Monday Wednesday Friday         Relevant Medications    carvedilol (Coreg) 6.25 MG tablet           Patient Instructions   Weight is down 7 pounds with metolazone diuresis  Continue current therapy including the Lasix of 40 mg daily but reduce metolazone to 2.5 mg Monday Wednesday Friday  Continue all other medications  Healthy cardiac diet  Patient may ambulate and may go to her home as needed  Return visit in 2 weeks with preoffice visit of CMP       Plan of care reviewed with patient at the conclusion of today's visit. Education was provided regarding diagnosis, management, and any prescribed or recommended OTC medications.Patient verbalizes understanding of and agreement with management plan.         Nj Mckeon MD      Note: Part of this note may be an electronic transcription/translation of spoken language to printed text using the  Dragon Dictation system.    Transcribed from ambient dictation for Nj Mckeon MD by Idalia Kapoor.  02/16/22   13:07 EST    Patient verbalized consent to the visit recording.  I have personally performed the services described in this document as transcribed by the above individual, and it is both accurate and complete.  Nj Mckeon MD  2/16/2022  13:28 EST

## 2022-02-15 NOTE — ASSESSMENT & PLAN NOTE
Essential hypertension with goal blood pressure 106/60 repeated 112/60 left and right sitting on Lasix 40 mg daily and recent metolazone 2.5 mg daily and carvedilol 6.25 mg twice daily  We will reduce metolazone to 2.5 mg Monday Wednesday Friday

## 2022-02-15 NOTE — ASSESSMENT & PLAN NOTE
Paroxysmal atrial fibrillation on Eliquis 2.5 mg twice daily carvedilol 6.25 mg twice daily and Lasix 40 mg daily patient recently developed congestive heart failure with severe leg edema and was placed on metolazone 2.5 mg for the past 7 days the patient has had a 7 pound diuresis with improvement in O2 saturation of 98% on O2 3 L with weight down to 130 pounds with 7 pound weight loss the patient still has some thigh edema in bilateral knee medial aspect edema in lower leg edema but is markedly improved and will continue Lasix of 40 mg daily but reduce the metolazone to 2.5 mg Monday Wednesday Friday

## 2022-02-15 NOTE — ASSESSMENT & PLAN NOTE
Recent exacerbation of heart failure with severe edema and metolazone 2.5 mg daily was added on February 8 the patient has had a 7 pound diuresis with marked improvement in her shortness of breath we will continue Lasix 40 mg daily Eliquis of 2.5 mg twice daily Lipitor 40 carvedilol 6.25 twice daily and continue metolazone but reduce dose of 2.5 mg Monday Wednesday Friday

## 2022-02-15 NOTE — PATIENT INSTRUCTIONS
Weight is down 7 pounds with metolazone diuresis  Continue current therapy including the Lasix of 40 mg daily but reduce metolazone to 2.5 mg Monday Wednesday Friday  Continue all other medications  Healthy cardiac diet  Patient may ambulate and may go to her home as needed  Return visit in 2 weeks with preoffice visit of CMP

## 2022-03-02 PROBLEM — L03.031: Status: ACTIVE | Noted: 2022-01-01

## 2022-03-02 NOTE — ASSESSMENT & PLAN NOTE
Paroxysmal atrial fibrillation with current heart rate 68 and irregular with blood pressure 128/80 continue carvedilol 6.25 mg twice daily and Eliquis 2.5 mg twice daily

## 2022-03-02 NOTE — ASSESSMENT & PLAN NOTE
Current blood pressure 128/80 with a heart rate of 68 irregular atrial fibrillation on carvedilol 6.25 mg twice daily Lasix 40 mg daily continue therapy CMP is pending

## 2022-03-02 NOTE — PROGRESS NOTES
[facility]     Amparo Valdes  2/3/1930   [id number]      Patient Care Team:  Nj Mckeon MD as PCP - General  Nj Mckeon MD as PCP - Family Medicine  Harjeet Martinez MD as Consulting Physician (Interventional Cardiology)  Dwight De Souza MD as Consulting Physician (Urology)    Chief Complaint::   Chief Complaint   Patient presents with   • Congestive Heart Failure     follow up.  Labs drawn prior to office visit - CMP            HPI  Patient 89-year-old female with history of essential hypertension paroxysmal atrial fibrillation mixed hyperlipidemia with recent change in diuretics having lost approximately 11 pounds in the past 2 and half weeks on Lasix of 40 mg daily and metolazone has been reduced to 2.5 mg Monday Wednesday Friday she continues her levothyroxine and Cytomel and continues her carvedilol and atorvastatin and Eliquis the patient states she is mildly short of breath with effort is on O2 2 L a minute her edema has resolved on the left and she has trace edema in the right lower leg and complaining of soreness of the right third toe tip.      Patient Active Problem List   Diagnosis   • Irritable bowel syndrome with constipation and diarrhea   • Chronic cystitis   • Benign essential hypertension   • ASHD (arteriosclerotic heart disease)   • Moderate COPD (chronic obstructive pulmonary disease) ([facility])   • Acquired hypothyroidism   • Other specified anemias   • Primary osteoarthritis involving multiple joints   • Ischemic heart disease   • Gastroesophageal reflux disease without esophagitis   • Arteriosclerosis of coronary artery bypass graft   • Asthenia   • Chronic UTI   • CHF (congestive heart failure) ([facility])   • Femoral hernia   • First degree atrioventricular block   • Heart disorder   • Herpes zoster   • Hyponatremia   • Iron deficiency anemia   • Low blood pressure   • Mixed hyperlipidemia   • Osteoarthritis   • Parasomnia   • Urethritis   • Paroxysmal atrial fibrillation  ([facility])   • Acute on chronic respiratory failure with hypoxia ([facility])   • Lumbar pain   • Medicare annual wellness visit, subsequent   • COVID-19 virus infection   • Cellulitis of middle toe, right        Past Medical History:   Diagnosis Date   • Arthritis    • Cancer, skin, squamous cell 12/27/2012   • Colon polyps 1987    clear 1994   • COPD (chronic obstructive pulmonary disease) ([facility])    • Family history of coronary artery disease    • Family history of hyperlipidemia    • Family history of hypertension    • Family history of stroke    • Femoral hernia of right side 12/13/2011   • Hyperlipidemia    • Hypertension    • Hypothyroidism    • Osteoarthritis    • Osteoporosis    • Pneumonia    • Rectal fistula     repair   • SBO (small bowel obstruction) ([facility]) 09/13/2012    Had right inguinal hernia repair   • Sepsis ([facility]) 04/2014    - Drug therapy    • Stress incontinence     A and P  repair   • Thyroid disease        Past Surgical History:   Procedure Laterality Date   • ANAL FISTULA REPAIR     • CARDIAC SURGERY  10/2011    stents placed   • CATARACT EXTRACTION     • CORONARY ANGIOPLASTY WITH STENT PLACEMENT  11/2011    x3   • HYSTERECTOMY     • INGUINAL HERNIA REPAIR     • OTHER SURGICAL HISTORY      thyroid uptake   • VAGINAL VAULT SUSPENSION         Family History   Problem Relation Age of Onset   • Stroke Mother    • Heart disease Father    • Hypertension Brother    • Diabetes Brother    • Diabetes Daughter    • Heart disease Daughter    • Hypertension Son    • Hypertension Other    • Coronary artery disease Other    • Stroke Other    • Hyperlipidemia Other    • Other Other         ichthyosis       Social History     Socioeconomic History   • Marital status:    Tobacco Use   • Smoking status: Former Smoker   • Smokeless tobacco: Former User   • Tobacco comment: quit at approx age 60   Substance and Sexual Activity   • Alcohol use: Yes     Comment: rarely   • Drug use: No   • Sexual activity: Defer  "      Allergies   Allergen Reactions   • Acetaminophen Unknown (See Comments)     Darvocet- N100   • Baycol [Cerivastatin] Unknown (See Comments)     unknown   • Ciprofloxacin Unknown (See Comments)     Unknown   • Ciprofloxacin Hcl Unknown (See Comments)     unknown   • Codeine Unknown (See Comments)     unknown   • Fosamax [Alendronate Sodium] Unknown (See Comments)     unknown   • Meperidine Unknown (See Comments)     unknown   • Methadone Unknown (See Comments)     unknown   • Morphine Unknown (See Comments)     unknown   • Naloxone Unknown (See Comments)     unknown   • Propoxyphene Unknown (See Comments)     darvocet-n 100   • Sulfa Antibiotics Unknown (See Comments)     unknown   • Ubidecarenone GI Intolerance   • Zocor [Simvastatin] Unknown (See Comments)     unknown       Review of Systems     HEENT: Denies headache or dizzy  NECK: Denies pain or stiffness  CHEST: Complains of shortness of breath on O2 2 L a minute has occasional cough denies wheeze denies recent lung infection  CARDIAC: Denies chest pain or palpitations  ABD: Denies nausea vomiting  : Denies dysuria frequency  NEURO: Denies syncope concussion  PSYCH: Denies anxiety depression  EXTREM: Complains of lower leg edema improved on the left with trace edema in the right lower leg with soreness of the right third toe tip  SKIN: Soreness of the right third toe tip    Vital Signs  Vitals:    03/02/22 1530   BP: 128/80   BP Location: Left arm   Patient Position: Sitting   Cuff Size: Adult   Pulse: 68   Temp: 98.2 °F (36.8 °C)   TempSrc: Infrared   SpO2: 97%  Comment: on oxygen at 3L/NC   Weight: 57.4 kg (126 lb 9.6 oz)   Height: 165.1 cm (65\")   PainSc: 0-No pain     Body mass index is 21.07 kg/m².  Patient's Body mass index is 21.07 kg/m². indicating that she is within normal range (BMI 18.5-24.9). No BMI management plan needed..     Advance Care Planning   ACP discussion was held with the patient during this visit. Patient has an advance " directive in EMR which is still valid.        Current Outpatient Medications:   •  apixaban (ELIQUIS) 2.5 MG tablet tablet, Take 1 tablet by mouth 2 (Two) Times a Day., Disp: 60 tablet, Rfl: 11  •  atorvastatin (LIPITOR) 40 MG tablet, TAKE 1 TABLET BY MOUTH EVERY DAY, Disp: 90 tablet, Rfl: 2  •  calcium carbonate-vitamin d (CALTRATE 600+D) 600-400 MG-UNIT per tablet, Take 1 tablet by mouth Daily., Disp: , Rfl:   •  carvedilol (Coreg) 6.25 MG tablet, Take 1 tablet by mouth 2 (Two) Times a Day With Meals., Disp: 180 tablet, Rfl: 3  •  furosemide (LASIX) 40 MG tablet, TAKE 1 TABLET BY MOUTH EVERY DAY, Disp: 90 tablet, Rfl: 3  •  gabapentin (NEURONTIN) 300 MG capsule, TAKE 1 CAPSULE BY MOUTH TWICE A DAY, Disp: 180 capsule, Rfl: 0  •  KLOR-CON 10 MEQ CR tablet, TAKE 1 TABLET BY MOUTH EVERY DAY, Disp: 90 tablet, Rfl: 3  •  levothyroxine (SYNTHROID, LEVOTHROID) 50 MCG tablet, Note chg dose to 50 mcg from 75, Disp: 90 tablet, Rfl: 3  •  liothyronine (CYTOMEL) 5 MCG tablet, TAKE 1 TABLET BY MOUTH EVERY DAY, Disp: 90 tablet, Rfl: 1  •  metOLazone (ZAROXOLYN) 2.5 MG tablet, Take 1 tablet 2.5 mg Monday Wednesday Friday, Disp: 15 tablet, Rfl: 1  •  Multiple Vitamins-Minerals (CENTRUM SILVER PO), Take 1 tablet by mouth Daily., Disp: , Rfl:   •  O2 (OXYGEN), Inhale 2 L/min Every Night. Takes 3 l every night, Disp: , Rfl:   •  pantoprazole (PROTONIX) 40 MG EC tablet, Take 1 tablet by mouth Daily., Disp: 90 tablet, Rfl: 1  •  polycarbophil (calcium polycarbophil) 625 MG tablet tablet, Take 625 mg by mouth Daily., Disp: , Rfl:   •  Propylene Glycol (Systane Complete) 0.6 % solution, Apply 1 drop to eye(s) as directed by provider Daily As Needed., Disp: , Rfl:   •  trimethoprim (TRIMPEX) 100 MG tablet, Take 1 tablet by mouth Daily., Disp: 90 tablet, Rfl: 3  •  cefdinir (OMNICEF) 300 MG capsule, Take 1 capsule by mouth 2 (Two) Times a Day., Disp: 16 capsule, Rfl: 0    Physical Exam     ACE III MINI         HEENT: No asymmetry mild  hearing loss  NECK: No mass bruit or thyromegaly  CHEST: Distant breath sounds in the upper two thirds of the lungs lower crackles minor  CARDIAC: Irregular without gallop or rub blood pressure stable heart rate normal  ABD: Liver spleen normal good bowel sounds  : Deferred  NEURO: Intact  PSYCH: No stress depression  EXTREM: Left lower leg edema resolved right lower leg trace edema soreness of the right third toe  Skin: Erythema right third toe tip low-grade cellulitis     Results Review:    No results found for this or any previous visit (from the past 672 hour(s)).  Procedures    Medication Review: Medications reviewed and noted    Patient wellness counseling  Exercise: Encouraged rest  Diet: Healthy cardiac diet  Screening: CMP pending  Social History     Socioeconomic History   • Marital status:    Tobacco Use   • Smoking status: Former Smoker   • Smokeless tobacco: Former User   • Tobacco comment: quit at approx age 60   Substance and Sexual Activity   • Alcohol use: Yes     Comment: rarely   • Drug use: No   • Sexual activity: Defer        Assessment/Plan:    Problem List Items Addressed This Visit        Cardiac and Vasculature    Benign essential hypertension    Overview     History of essential hypertension with current blood pressure 120/70 on carvedilol 6.25 twice daily Lasix 20 mg daily blood pressure is in the low normal range the patient is asymptomatic denying headache dizzy or lightheadedness and no falls we will continue current therapy.         Current Assessment & Plan       Current blood pressure 128/80 with a heart rate of 68 irregular atrial fibrillation on carvedilol 6.25 mg twice daily Lasix 40 mg daily continue therapy CMP is pending         Relevant Medications    carvedilol (Coreg) 6.25 MG tablet    furosemide (LASIX) 40 MG tablet    metOLazone (ZAROXOLYN) 2.5 MG tablet    CHF (congestive heart failure) (HCC) - Primary    Overview     History of chronic heart failure improved on  carvedilol Eliquis Lipitor Lasix and O2         Current Assessment & Plan       Heart failure has improved on the Lasix 40 mg and metolazone 2.5 mg Monday Wednesday Friday with total loss of 11 pounds since February 8, 2022 secondary to the diuresis primarily the metolazone.  Patient continues oxygen 2 L a minute continues Lasix 40 mg daily and metolazone 2.5 mg Monday Wednesday Friday CMP is pending left leg has no edema right leg has minor edema and chest has good breath sounds except for a few crackles at the base particular on the left.  Continue same medication         Relevant Medications    carvedilol (Coreg) 6.25 MG tablet    Mixed hyperlipidemia    Overview     History of mixed hyperlipidemia on atorvastatin 40 mg daily denies myalgia arthralgia         Current Assessment & Plan       Mixed hyperlipidemia on atorvastatin 40 mg daily continue therapy         Relevant Medications    atorvastatin (LIPITOR) 40 MG tablet    Paroxysmal atrial fibrillation (HCC)    Overview     Atrial fibrillation noted on EKG symptomatic on May 20, 2019 refer to cardiology underwent cardioversion is on Eliquis 5 mg twice daily Coreg 6.25 twice daily with current heart rate of 60/min         Current Assessment & Plan     Paroxysmal atrial fibrillation with current heart rate 68 and irregular with blood pressure 128/80 continue carvedilol 6.25 mg twice daily and Eliquis 2.5 mg twice daily         Relevant Medications    carvedilol (Coreg) 6.25 MG tablet       Endocrine and Metabolic    Acquired hypothyroidism    Overview     History of hypothyroidism on Synthroid 75 MCG daily patient's TSH is low will reduce the Synthroid to 50 MCG daily and continue the Cytomel at 5 mg daily         Current Assessment & Plan     Hypothyroidism on levothyroxine 50 MCG daily and Cytomel 5 MCG daily continue therapy         Relevant Medications    levothyroxine (SYNTHROID, LEVOTHROID) 50 MCG tablet    liothyronine (CYTOMEL) 5 MCG tablet    carvedilol  (Coreg) 6.25 MG tablet       Infectious Diseases    Cellulitis of middle toe, right    Overview     Tenderness erythema right third toe         Current Assessment & Plan     Tenderness erythema of the right third toe consistent with low-grade cellulitis we will treat with Omnicef 300 mg twice daily for 8 days                Patient Instructions   CMP drawn earlier pending  Continue all current medicines including Lasix 40 mg and metolazone 2.5 mg Monday Wednesday Friday  Treat the right third toe cellulitis with Omnicef 300 mg twice daily  Continue O2  Continue with cardiac diet  Return visit in 4 weeks or as needed       Plan of care reviewed with patient at the conclusion of today's visit. Education was provided regarding diagnosis, management, and any prescribed or recommended OTC medications.Patient verbalizes understanding of and agreement with management plan.         Nj Mckeon MD      Note: Part of this note may be an electronic transcription/translation of spoken language to printed text using the Dragon Dictation system.

## 2022-03-02 NOTE — ASSESSMENT & PLAN NOTE
Tenderness erythema of the right third toe consistent with low-grade cellulitis we will treat with Omnicef 300 mg twice daily for 8 days

## 2022-03-02 NOTE — PATIENT INSTRUCTIONS
CMP drawn earlier pending  Continue all current medicines including Lasix 40 mg and metolazone 2.5 mg Monday Wednesday Friday  Treat the right third toe cellulitis with Omnicef 300 mg twice daily  Continue O2  Continue with cardiac diet  Return visit in 4 weeks or as needed

## 2022-03-02 NOTE — ASSESSMENT & PLAN NOTE
Heart failure has improved on the Lasix 40 mg and metolazone 2.5 mg Monday Wednesday Friday with total loss of 11 pounds since February 8, 2022 secondary to the diuresis primarily the metolazone.  Patient continues oxygen 2 L a minute continues Lasix 40 mg daily and metolazone 2.5 mg Monday Wednesday Friday CMP is pending left leg has no edema right leg has minor edema and chest has good breath sounds except for a few crackles at the base particular on the left.  Continue same medication

## 2022-03-03 NOTE — TELEPHONE ENCOUNTER
Caller: ELADIO RAZO    Relationship: Emergency Contact    Best call back number: [phone]    Requested Prescriptions:   Requested Prescriptions     Pending Prescriptions Disp Refills   • gabapentin (NEURONTIN) 300 MG capsule [Pharmacy Med Name: GABAPENTIN 300 MG CAPSULE] 180 capsule 0     Sig: TAKE 1 CAPSULE BY MOUTH TWICE A DAY        Pharmacy where request should be sent: [facility]/PHARMACY #[id number] [address] [phone] [facility] [phone] FX     Additional details provided by patient:     Does the patient have less than a 3 day supply:  [x] Yes  [] No    Elle Raygoza Rep   03/03/22 13:09 EST

## 2022-03-31 NOTE — TELEPHONE ENCOUNTER
Rx Refill Note  Requested Prescriptions     Pending Prescriptions Disp Refills   • atorvastatin (LIPITOR) 40 MG tablet [Pharmacy Med Name: ATORVASTATIN 40 MG TABLET] 90 tablet 2     Sig: TAKE 1 TABLET BY MOUTH EVERY DAY      Last office visit with prescribing clinician: 3/2/2022      Next office visit with prescribing clinician: 4/13/2022            Natalia Zambrano LPN  03/31/22, 07:40 EDT

## 2022-04-04 NOTE — TELEPHONE ENCOUNTER
Caller: ELADIO RAZO    Relationship to patient: Emergency Contact    Best call back number: [phone]     What is the call regarding:  PATIENT'S DAUGHTER IS CALLING TO SEE IF SHE NEEDS TO GET A BLOOD TEST BEFORE TO CHECK HER KIDNEY FUNCTION BEFORE HER APPOINTMENT ON Thursday, 4/7/22

## 2022-04-07 PROBLEM — L98.9 SKIN LESION OF RIGHT LEG: Status: ACTIVE | Noted: 2022-01-01

## 2022-04-07 NOTE — ASSESSMENT & PLAN NOTE
Atrial fibrillation on Eliquis 2.5 mg twice daily atorvastatin 40 carvedilol 6.25 twice daily Lasix 40 mg daily and metolazone 2.5 as needed continue therapy

## 2022-04-07 NOTE — ASSESSMENT & PLAN NOTE
Patient's congestive heart failure is improved she has trace edema lungs are clear and on O2 2 L a minute and currently on Lasix 40 mg daily with infrequent Zaroxolyn 2.5 continue therapy BMP is pending

## 2022-04-07 NOTE — ASSESSMENT & PLAN NOTE
Essential hypertension with current blood pressure 110/72 on carvedilol 6.25 mg twice daily Lasix 40 mg daily and metolazone 2.5 mg as needed currently infrequent, BMP is pending

## 2022-04-07 NOTE — PROGRESS NOTES
[facility]     Amparo Valdes  2/3/1930   [id number]      Patient Care Team:  Nj Mckeon MD as PCP - General  Nj Mckeon MD as PCP - Family Medicine  Harjeet Martinez MD as Consulting Physician (Interventional Cardiology)  Dwight De Souza MD as Consulting Physician (Urology)    Chief Complaint::   Chief Complaint   Patient presents with   • Congestive Heart Failure     Follow up   • toe cellulitis     Follow up   • skin wound     Right upper inner thigh.          Patient verbalized consent to the visit recording.      HPI  The patient is an 89-year-old female with follow-up of left third toe cellulitis, congestive heart failure, and skin wound of the right upper inner thigh with mixed hyperlipidemia, degenerative arthritis disease, coronary disease, and hypothyroidism. She is accompanied by her daughter.    The patient reports that her foot infection has improved. She notes that her breathing is about the same. She reports that she gets out of breath with the same amount of exertion. She states that her legs are swollen more or less behind her knee. She denies headaches or dizziness. She states that she is wearing oxygen most of the time. She denies any heart racing or skipping. She denies any upset stomach. She states that her leg had some discomfort until it got opened up with a scab on it. She denies any injury. She states that she has not put any medication on it or Band-Aid. She notes that she could not keep a Band-Aid on that spot and it would tear her skin off. She states that it has been there a long time. She notes that she does not have a dermatologist. She reports that she saw Rick Berry at [facility]. She states that she can not go to the dermatologist as often. She reports that she is wearing oxygen about 4 hours a day.     Patient Active Problem List   Diagnosis   • Irritable bowel syndrome with constipation and diarrhea   • Chronic cystitis   • Benign  essential hypertension   • ASHD (arteriosclerotic heart disease)   • Moderate COPD (chronic obstructive pulmonary disease) ([facility])   • Acquired hypothyroidism   • Other specified anemias   • Primary osteoarthritis involving multiple joints   • Ischemic heart disease   • Gastroesophageal reflux disease without esophagitis   • Arteriosclerosis of coronary artery bypass graft   • Asthenia   • Chronic UTI   • CHF (congestive heart failure) ([facility])   • Femoral hernia   • First degree atrioventricular block   • Heart disorder   • Herpes zoster   • Hyponatremia   • Iron deficiency anemia   • Low blood pressure   • Mixed hyperlipidemia   • Osteoarthritis   • Parasomnia   • Urethritis   • Paroxysmal atrial fibrillation ([facility])   • Acute on chronic respiratory failure with hypoxia ([facility])   • Lumbar pain   • Medicare annual wellness visit, subsequent   • COVID-19 virus infection   • Cellulitis of middle toe, right   • Skin lesion of right leg        Past Medical History:   Diagnosis Date   • Arthritis    • Cancer, skin, squamous cell 12/27/2012   • Colon polyps 1987    clear 1994   • COPD (chronic obstructive pulmonary disease) ([facility])    • Family history of coronary artery disease    • Family history of hyperlipidemia    • Family history of hypertension    • Family history of stroke    • Femoral hernia of right side 12/13/2011   • Hyperlipidemia    • Hypertension    • Hypothyroidism    • Osteoarthritis    • Osteoporosis    • Pneumonia    • Rectal fistula     repair   • SBO (small bowel obstruction) ([facility]) 09/13/2012    Had right inguinal hernia repair   • Sepsis ([facility]) 04/2014    - Drug therapy    • Stress incontinence     A and P  repair   • Thyroid disease        Past Surgical History:   Procedure Laterality Date   • ANAL FISTULA REPAIR     • CARDIAC SURGERY  10/2011    stents placed   • CATARACT EXTRACTION     • CORONARY ANGIOPLASTY WITH STENT PLACEMENT  11/2011    x3   • HYSTERECTOMY     • INGUINAL HERNIA REPAIR     • OTHER  SURGICAL HISTORY      thyroid uptake   • VAGINAL VAULT SUSPENSION         Family History   Problem Relation Age of Onset   • Stroke Mother    • Heart disease Father    • Hypertension Brother    • Diabetes Brother    • Diabetes Daughter    • Heart disease Daughter    • Hypertension Son    • Hypertension Other    • Coronary artery disease Other    • Stroke Other    • Hyperlipidemia Other    • Other Other         ichthyosis       Social History     Socioeconomic History   • Marital status:    Tobacco Use   • Smoking status: Former Smoker   • Smokeless tobacco: Former User   • Tobacco comment: quit at approx age 60   Substance and Sexual Activity   • Alcohol use: Yes     Comment: rarely   • Drug use: No   • Sexual activity: Defer       Allergies   Allergen Reactions   • Acetaminophen Unknown (See Comments)     Darvocet- N100   • Baycol [Cerivastatin] Unknown (See Comments)     unknown   • Ciprofloxacin Unknown (See Comments)     Unknown   • Ciprofloxacin Hcl Unknown (See Comments)     unknown   • Codeine Unknown (See Comments)     unknown   • Fosamax [Alendronate Sodium] Unknown (See Comments)     unknown   • Meperidine Unknown (See Comments)     unknown   • Methadone Unknown (See Comments)     unknown   • Morphine Unknown (See Comments)     unknown   • Naloxone Unknown (See Comments)     unknown   • Propoxyphene Unknown (See Comments)     darvocet-n 100   • Sulfa Antibiotics Unknown (See Comments)     unknown   • Ubidecarenone GI Intolerance   • Zocor [Simvastatin] Unknown (See Comments)     unknown       Review of Systems     HEENT: Denies any hearing changes, eyesight changes, no headache or dizziness  NECK: Denies any pain, stiffness, swelling or dysphagia  CHEST: Denies cough or wheeze or recent lung infections  CARDIAC: Denies chest pain, pressure or palpitations  ABD: Denies nausea, vomiting or abdominal pain  : Denies dysuria  NEURO: Denies syncope, concussion, neuropathy  PSYCH: Denies any  "stress  EXTREM: Denies arthritic changes myalgia or arthralgia  SKIN: Denies any rash complains of lesion medial aspect right upper thigh    Vital Signs  Vitals:    04/07/22 1551   BP: 110/72   BP Location: Right arm   Patient Position: Sitting   Cuff Size: Adult   Pulse: 60   Temp: 98.2 °F (36.8 °C)   TempSrc: Infrared   Weight: 58.3 kg (128 lb 9.6 oz)   Height: 165.1 cm (65\")   PainSc: 0-No pain     Body mass index is 21.4 kg/m².  Patient's Body mass index is 21.4 kg/m². indicating that she is within normal range (BMI 18.5-24.9). No BMI management plan needed..     Advance Care Planning   ACP discussion was held with the patient during this visit. Patient has an advance directive in EMR which is still valid.        Current Outpatient Medications:   •  apixaban (ELIQUIS) 2.5 MG tablet tablet, Take 1 tablet by mouth 2 (Two) Times a Day., Disp: 60 tablet, Rfl: 11  •  atorvastatin (LIPITOR) 40 MG tablet, TAKE 1 TABLET BY MOUTH EVERY DAY, Disp: 90 tablet, Rfl: 2  •  calcium carbonate-vitamin d 600-400 MG-UNIT per tablet, Take 1 tablet by mouth Daily., Disp: , Rfl:   •  carvedilol (Coreg) 6.25 MG tablet, Take 1 tablet by mouth 2 (Two) Times a Day With Meals., Disp: 180 tablet, Rfl: 3  •  furosemide (LASIX) 40 MG tablet, TAKE 1 TABLET BY MOUTH EVERY DAY, Disp: 90 tablet, Rfl: 3  •  gabapentin (NEURONTIN) 300 MG capsule, TAKE 1 CAPSULE BY MOUTH TWICE A DAY, Disp: 180 capsule, Rfl: 0  •  KLOR-CON 10 MEQ CR tablet, TAKE 1 TABLET BY MOUTH EVERY DAY, Disp: 90 tablet, Rfl: 3  •  levothyroxine (SYNTHROID, LEVOTHROID) 50 MCG tablet, Note chg dose to 50 mcg from 75, Disp: 90 tablet, Rfl: 3  •  liothyronine (CYTOMEL) 5 MCG tablet, TAKE 1 TABLET BY MOUTH EVERY DAY, Disp: 90 tablet, Rfl: 1  •  metOLazone (ZAROXOLYN) 2.5 MG tablet, One tablet once a week, Disp: 15 tablet, Rfl: 1  •  Multiple Vitamins-Minerals (CENTRUM SILVER PO), Take 1 tablet by mouth Daily., Disp: , Rfl:   •  O2 (OXYGEN), Inhale 2 L/min Every Night. Takes 3 l every " night, Disp: , Rfl:   •  pantoprazole (PROTONIX) 40 MG EC tablet, Take 1 tablet by mouth Daily., Disp: 90 tablet, Rfl: 1  •  polycarbophil 625 MG tablet tablet, Take 625 mg by mouth Daily., Disp: , Rfl:   •  Propylene Glycol (Systane Complete) 0.6 % solution, Apply 1 drop to eye(s) as directed by provider Daily As Needed., Disp: , Rfl:   •  trimethoprim (TRIMPEX) 100 MG tablet, TAKE 1 TABLET BY MOUTH EVERY DAY, Disp: 90 tablet, Rfl: 3  •  cefdinir (OMNICEF) 300 MG capsule, Take 1 capsule by mouth 2 (Two) Times a Day., Disp: 16 capsule, Rfl: 0    Physical Exam     ACE III MINI         HEENT: Pupils equal reactive ENT clear, no facial asymmetry, pharynx is clear  NECK: No masses bruit or thyromegaly  CHEST: Clear without rales wheezes or rhonchi  CARDIAC: Regular rhythm without gallop rub or click, blood pressure heart rate stable  ABD: Liver spleen normal, good bowel sounds, nontender  : Deferred  NEURO: Intact  PSYCH: Normal  EXTREM: No significant arthritic changes, no edema  SKIN: Right third toe looks good, legs are dry, appears to be an infected skin cancer. Right medial thigh is about 1 cm x 1 cm with a hard crust on the top, but it is irregular. A little crust on the top where it's obviously been bleeding, there is no depth to it. Right third toe is healed. Small lesion on the inside of her leg. Minimal fluid in lower legs.     Results Review:    Recent Results (from the past 672 hour(s))   Basic Metabolic Panel    Collection Time: 04/07/22  3:27 PM    Specimen: Blood   Result Value Ref Range    Glucose 122 (H) 65 - 99 mg/dL    BUN 27 (H) 8 - 23 mg/dL    Creatinine 1.10 (H) 0.57 - 1.00 mg/dL    Sodium 138 136 - 145 mmol/L    Potassium 4.0 3.5 - 5.2 mmol/L    Chloride 98 98 - 107 mmol/L    CO2 30.5 (H) 22.0 - 29.0 mmol/L    Calcium 9.5 8.2 - 9.6 mg/dL    BUN/Creatinine Ratio 24.5 7.0 - 25.0    Anion Gap 9.5 5.0 - 15.0 mmol/L    eGFR 47.2 (L) >60.0 mL/min/1.73     Procedures    Medication Review: Medications  reviewed and noted    Patient wellness counseling  Exercise: Continue ambulation with Rollator and O2  Diet: Continue healthy cardiac diet  Screening: BMP is pending drawn earlier this day  Social History     Socioeconomic History   • Marital status:    Tobacco Use   • Smoking status: Former Smoker   • Smokeless tobacco: Former User   • Tobacco comment: quit at approx age 60   Substance and Sexual Activity   • Alcohol use: Yes     Comment: rarely   • Drug use: No   • Sexual activity: Defer        Assessment/Plan:    Problem List Items Addressed This Visit        Cardiac and Vasculature    Benign essential hypertension - Primary    Overview     History of essential hypertension with current blood pressure 120/70 on carvedilol 6.25 twice daily Lasix 20 mg daily blood pressure is in the low normal range the patient is asymptomatic denying headache dizzy or lightheadedness and no falls we will continue current therapy.           Current Assessment & Plan       Essential hypertension with current blood pressure 110/72 on carvedilol 6.25 mg twice daily Lasix 40 mg daily and metolazone 2.5 mg as needed currently infrequent, BMP is pending           Relevant Medications    carvedilol (Coreg) 6.25 MG tablet    furosemide (LASIX) 40 MG tablet    metOLazone (ZAROXOLYN) 2.5 MG tablet    CHF (congestive heart failure) (HCC)    Overview     History of chronic heart failure improved on carvedilol Eliquis Lipitor Lasix and O2           Current Assessment & Plan       Patient's congestive heart failure is improved she has trace edema lungs are clear and on O2 2 L a minute and currently on Lasix 40 mg daily with infrequent Zaroxolyn 2.5 continue therapy BMP is pending           Relevant Medications    carvedilol (Coreg) 6.25 MG tablet    Mixed hyperlipidemia    Overview     History of mixed hyperlipidemia on atorvastatin 40 mg daily denies myalgia arthralgia           Current Assessment & Plan       Mixed hyperlipidemia on  atorvastatin 40 mg daily continue therapy           Relevant Medications    atorvastatin (LIPITOR) 40 MG tablet    Paroxysmal atrial fibrillation (HCC)    Overview     Atrial fibrillation noted on EKG symptomatic on May 20, 2019 refer to cardiology underwent cardioversion is on Eliquis 5 mg twice daily Coreg 6.25 twice daily with current heart rate of 60/min           Current Assessment & Plan     Atrial fibrillation on Eliquis 2.5 mg twice daily atorvastatin 40 carvedilol 6.25 twice daily Lasix 40 mg daily and metolazone 2.5 as needed continue therapy           Relevant Medications    carvedilol (Coreg) 6.25 MG tablet       Infectious Diseases    Cellulitis of middle toe, right    Overview     Tenderness erythema right third toe           Current Assessment & Plan     Cellulitis and infection have resolved on therapy              Skin    Skin lesion of right leg    Overview     Patient has noticed a progressive growth right medial thigh now approximately 1 cm in diameter with crusting and thickening likely carcinoma will refer to dermatology is seen Dr. Rick Berry in the past           Current Assessment & Plan     Skin lesion 1 cm right medial thigh likely carcinoma will refer to dermatology.  Jamal           Relevant Orders    Ambulatory Referral to Dermatology (Completed)           Patient Instructions   Lab BMP is pending  Continue all current therapy including the metolazone 2.5 mg as needed  Referral to dermatology Dr. Rick Berry for lesion right leg likely carcinoma  Continue healthy cardiac diet  Continue walking exercise using Rollator  Continue O2  Return visit in 2 months or as needed       Plan of care reviewed with patient at the conclusion of today's visit. Education was provided regarding diagnosis, management, and any prescribed or recommended OTC medications.Patient verbalizes understanding of and agreement with management plan.         Nj Mckeon MD      Note: Part of this note may be an  electronic transcription/translation of spoken language to printed text using the Dragon Dictation system.    Transcribed from ambient dictation for Nj Mckeon MD by Kishor Foreman.  04/07/22   17:58 EDT

## 2022-04-07 NOTE — PATIENT INSTRUCTIONS
Lab BMP is pending  Continue all current therapy including the metolazone 2.5 mg as needed  Referral to dermatology Dr. Rick Berry for lesion right leg likely carcinoma  Continue healthy cardiac diet  Continue walking exercise using Rollator  Continue O2  Return visit in 2 months or as needed

## 2022-05-20 PROBLEM — L03.031: Status: RESOLVED | Noted: 2022-01-01 | Resolved: 2022-01-01

## 2022-05-20 PROBLEM — D64.89 OTHER SPECIFIED ANEMIAS: Status: RESOLVED | Noted: 2019-01-25 | Resolved: 2022-01-01

## 2022-05-20 NOTE — PROGRESS NOTES
Amparo Valdes  2/3/1930  [id number]  Patient Care Team:  Nj Mckeon MD as PCP - General  Nj Mckeon MD as PCP - Family Medicine  Harjeet Martinez MD as Consulting Physician (Interventional Cardiology)  Dwight De Souza MD as Consulting Physician (Urology)    Amparo Valdes is a pleasant 89 y.o. female who presents for evaluation of Edema (Started Tuesday night)    Chief Complaint   Patient presents with   • Edema     Started Tuesday night       HPI:   The patient is a 89-year-old female complaining of bilateral leg swelling.  She has a pertinent medical history including A. fib, hyperlipidemia, CHF, hypertension, hypothyroidism, GERD, COPD, and osteoarthritis.  She has had multiple COVID-19 infections since 2021. She is on home O2    She has swelling in abdomen and buttocks and shortness of breath for the past 1-2 weeks, abdomen and buttocks. Past history of congestive heart failure and chronic edema with frequent exacerbations. She is accompanied by her daughter Tamiko.    The patient reports mild skin breakdown left posterior calf and right shin.  She does not like using the Medihoney but is keeping them clean with vinegar water solution and applying bandages.  She would prefer not to have home health but continue to do her own self-care.  They are improving.  The patient has been seen by Dr. Mckeon approximately 1 month ago for an infection on the right leg which has significantly improved.. The patient reports that she elevates her leg for at least 2 hours a day.    The patient is administering the metolazone 2.5 mg as needed, about once a week, and the last time was on 05/17/2022. She is also administering Lasix 40 mg daily.    Past Medical History:   Diagnosis Date   • Arthritis    • Cancer, skin, squamous cell 12/27/2012   • Colon polyps 1987    clear 1994   • COPD (chronic obstructive pulmonary disease) (HCC)    • Family history of coronary artery disease    • Family history of hyperlipidemia    •  Family history of hypertension    • Family history of stroke    • Femoral hernia of right side 12/13/2011   • Hyperlipidemia    • Hypertension    • Hypothyroidism    • Osteoarthritis    • Osteoporosis    • Pneumonia    • Rectal fistula     repair   • SBO (small bowel obstruction) ([facility]) 09/13/2012    Had right inguinal hernia repair   • Sepsis ([facility]) 04/2014    - Drug therapy    • Stress incontinence     A and P  repair   • Thyroid disease      Past Surgical History:   Procedure Laterality Date   • ANAL FISTULA REPAIR     • CARDIAC SURGERY  10/2011    stents placed   • CATARACT EXTRACTION     • CORONARY ANGIOPLASTY WITH STENT PLACEMENT  11/2011    x3   • HYSTERECTOMY     • INGUINAL HERNIA REPAIR     • OTHER SURGICAL HISTORY      thyroid uptake   • VAGINAL VAULT SUSPENSION       Family History   Problem Relation Age of Onset   • Stroke Mother    • Heart disease Father    • Hypertension Brother    • Diabetes Brother    • Diabetes Daughter    • Heart disease Daughter    • Hypertension Son    • Hypertension Other    • Coronary artery disease Other    • Stroke Other    • Hyperlipidemia Other    • Other Other         ichthyosis     Social History     Tobacco Use   Smoking Status Former Smoker   Smokeless Tobacco Former User   Tobacco Comment    quit at approx age 60     Allergies   Allergen Reactions   • Acetaminophen Unknown (See Comments)     Darvocet- N100   • Baycol [Cerivastatin] Unknown (See Comments)     unknown   • Ciprofloxacin Unknown (See Comments)     Unknown   • Ciprofloxacin Hcl Unknown (See Comments)     unknown   • Codeine Unknown (See Comments)     unknown   • Fosamax [Alendronate Sodium] Unknown (See Comments)     unknown   • Meperidine Unknown (See Comments)     unknown   • Methadone Unknown (See Comments)     unknown   • Morphine Unknown (See Comments)     unknown   • Naloxone Unknown (See Comments)     unknown   • Propoxyphene Unknown (See Comments)     darvocet-n 100   • Sulfa Antibiotics Unknown  (See Comments)     unknown   • Ubidecarenone GI Intolerance   • Zocor [Simvastatin] Unknown (See Comments)     unknown       Current Outpatient Medications:   •  apixaban (ELIQUIS) 2.5 MG tablet tablet, Take 1 tablet by mouth 2 (Two) Times a Day., Disp: 60 tablet, Rfl: 11  •  atorvastatin (LIPITOR) 40 MG tablet, TAKE 1 TABLET BY MOUTH EVERY DAY, Disp: 90 tablet, Rfl: 2  •  calcium carbonate-vitamin d 600-400 MG-UNIT per tablet, Take 1 tablet by mouth Daily., Disp: , Rfl:   •  carvedilol (Coreg) 6.25 MG tablet, Take 1 tablet by mouth 2 (Two) Times a Day With Meals., Disp: 180 tablet, Rfl: 3  •  furosemide (LASIX) 40 MG tablet, TAKE 1 TABLET BY MOUTH EVERY DAY, Disp: 90 tablet, Rfl: 3  •  gabapentin (NEURONTIN) 300 MG capsule, TAKE 1 CAPSULE BY MOUTH TWICE A DAY, Disp: 180 capsule, Rfl: 0  •  KLOR-CON 10 MEQ CR tablet, TAKE 1 TABLET BY MOUTH EVERY DAY, Disp: 90 tablet, Rfl: 3  •  levothyroxine (SYNTHROID, LEVOTHROID) 50 MCG tablet, Note chg dose to 50 mcg from 75, Disp: 90 tablet, Rfl: 3  •  liothyronine (CYTOMEL) 5 MCG tablet, TAKE 1 TABLET BY MOUTH EVERY DAY, Disp: 90 tablet, Rfl: 1  •  metOLazone (ZAROXOLYN) 2.5 MG tablet, One tablet once a week, Disp: 15 tablet, Rfl: 1  •  Multiple Vitamins-Minerals (CENTRUM SILVER PO), Take 1 tablet by mouth Daily., Disp: , Rfl:   •  O2 (OXYGEN), Inhale 3 L/min Every Night. Takes 3 l every night, Disp: , Rfl:   •  pantoprazole (PROTONIX) 40 MG EC tablet, Take 1 tablet by mouth Daily., Disp: 90 tablet, Rfl: 1  •  polycarbophil 625 MG tablet tablet, Take 625 mg by mouth Daily., Disp: , Rfl:   •  Propylene Glycol (Systane Complete) 0.6 % solution, Apply 1 drop to eye(s) as directed by provider Daily As Needed., Disp: , Rfl:   •  trimethoprim (TRIMPEX) 100 MG tablet, TAKE 1 TABLET BY MOUTH EVERY DAY, Disp: 90 tablet, Rfl: 3    Review of Systems  Review of systems was completed, and pertinent findings are noted in the HPI.    /72 (BP Location: Left arm, Patient Position:  "Sitting, Cuff Size: Pediatric)   Pulse 68   Temp 97.3 °F (36.3 °C) (Infrared)   Ht 165.1 cm (65\")   Wt 67.1 kg (148 lb)   SpO2 93%   BMI 24.63 kg/m²     Physical Exam  Constitutional:       Appearance: She is well-developed.   HENT:      Head: Normocephalic and atraumatic.      Comments: *wearing mask  Eyes:      Conjunctiva/sclera: Conjunctivae normal.      Pupils: Pupils are equal, round, and reactive to light.   Cardiovascular:      Rate and Rhythm: Normal rate. Rhythm irregularly irregular.      Pulses: Decreased pulses.           Dorsalis pedis pulses are detected w/ Doppler on the right side and detected w/ Doppler on the left side.      Heart sounds: Murmur heard.    Systolic murmur is present with a grade of 4/6.     Comments: Pitting edema toes to hips bilat.    Pulmonary:      Effort: Pulmonary effort is normal.      Breath sounds: Normal breath sounds.   Abdominal:      General: Abdomen is protuberant. Bowel sounds are normal.      Tenderness: There is no abdominal tenderness.      Comments: abd firm   Musculoskeletal:         General: Normal range of motion.      Cervical back: Normal range of motion and neck supple.      Right lower le+ Edema present.      Left lower le+ Edema present.   Skin:     General: Skin is warm and dry.      Comments: Small <3cm oval skin ulcerations, mild with s/s infx.   Neurological:      Mental Status: She is alert and oriented to person, place, and time.   Psychiatric:         Mood and Affect: Mood normal.         Behavior: Behavior normal.         Thought Content: Thought content normal.         Judgment: Judgment normal.         Procedures    PHQ-9 Total Score:      Assessment/Plan:  Diagnoses and all orders for this visit:    1. Acute on chronic congestive heart failure, unspecified heart failure type (HCC) (Primary)  -     CBC & Differential; Future  -     Comprehensive Metabolic Panel; Future  -     BNP; Future    2. Paroxysmal atrial fibrillation " (HCC)    3. Benign essential hypertension       There are no Patient Instructions on file for this visit.     Lower extremity edema  She has been administering metolazone 2.5 mg as needed. I have asked her to take the metolazone consecutively for the next 3 days. The patient is also administering Lasix 40 mg daily. Patient will continue to elevate her lower extremities.  Labs today    Plan of care reviewed with patient at the conclusion of today's visit. Education was provided regarding diagnosis, management and any prescribed or recommended OTC medications.  Patient verbalizes understanding of and agreement with management plan.    No follow-ups on file.    Dictated Utilizing Dragon Dictation.    YESENIA Fernandez     Transcribed from ambient dictation for YESENIA Fernandez by REGINALDO CHILDS.  05/20/22   16:25 EDT    Patient verbalized consent to the visit recording.

## 2022-05-23 NOTE — TELEPHONE ENCOUNTER
Caller: ELADIO RAZO    Relationship: Emergency Contact    Best call back number: [phone]    What is the best time to reach you: ANYTIME     Who are you requesting to speak with (clinical staff, provider,  specific staff member): LUCI BUCKNER     What was the call regarding: PATIENT'S DAUGHTER IS CALLING BECAUSE SHE SAYS THAT THE PATIENT IS CONVINCED THAT SHE HAS A UTI. THE PATIENT'S CAREGIVERS HAVE STATED THAT HER TOE IS GETTING INFECTED AGAIN. THEY ARE HOPPING TOP GET HER AN ANTIBIOTIC.     Do you require a callback: YES

## 2022-05-23 NOTE — TELEPHONE ENCOUNTER
Note symptoms of toe and UTI and allergy to sulfa and ciprofloxacin, I have sent Omnicef 300 mg daily for 10 days please inform and let us know cefadroxil

## 2022-05-23 NOTE — TELEPHONE ENCOUNTER
"I called patient's daughter.  Patient saw Kira on 5/20/22 with increased edema and SOB.  Her BNP was >10,000.  Hilary added.    Daughter states patient is c/o having a UTI but she doesn't know her specific symptoms. Patient just said \"I know I have one\".     She also states that her mother said she has a sore on her toe with \"pus on it\".  Patient refused to let Kira look at it at the OV because she thought the toe was OK.  Please advise.   "

## 2022-05-25 PROBLEM — R60.0 LOCALIZED EDEMA: Status: ACTIVE | Noted: 2022-01-01

## 2022-05-25 NOTE — PROGRESS NOTES
"[facility]     Amparo Valdes  2/3/1930   [id number]      Patient Care Team:  Nj Mckeon MD as PCP - General  Nj Mckeon MD as PCP - Family Medicine  Harjeet Martinez MD as Consulting Physician (Interventional Cardiology)  Dwight De Souza MD as Consulting Physician (Urology)    Chief Complaint::   Chief Complaint   Patient presents with   • Congestive Heart Failure     Follow up.  SOB with exertion.  Edema in lower extremities.  Legs are weeping.    • Urinary Tract Infection   • toe wound     3rd toe right foot            HPI    The patient is a 89-year-old female with a return visit after 5 days of increasing diuretics for congestive heart failure with severe edema. She also has a urinary tract infection and toe wound of the right third toe on her foot. She has essential hypertension, mixed hyperlipidemia, and hypothyroidism. She is on Zaroxolyn-metolazone 2.5 mg daily, as well as her Lasix of 40 mg daily. She has not started the cefdinir antibiotic that was prescribed on 05/20/2022. She is accompanied by an adult female.     The patient states that she is feeling \"alright to a certain extent,\" and continues to feel the same as she did on 05/20/2022. She adds her bladder has been more active; however, the adult female believes the patient is losing more fluid through the constant weeping in her bilateral lower extremities rather than with urination. She adds the patient has lost 10 pounds. The patient notes she is breathing some better when she is not exerting herself. She reports she had significant difficulty with her breathing when she was entering and exiting the motor vehicle to make it to this appointment. The patient denies any headache, dizziness, chest pain, pressure, or heart skipping. She denies any upset stomach. The patient states that she has a good appetite; however, her abdomen feels bloated.    The patient reports needing assistance elevating her bilateral lower " extremities; however, she does not always have assistance. She reports she typically sits in a La-Z-Boy and elevates her bilateral lower extremities on pillows that are placed on an office chair; however, she does not elevate them at night. The patient states she stays up until 12:00 AM to 1:00 AM reading or listening to the television and wakes up at approximately 8:30 AM to 9:00 AM. She denies sleeping in a bed nightly. She adds having swelling in her bilateral knees and posterior knees, left greater than right. The adult female states that they are short-handed where the patient lives. The adult female states that it may be better to get a private assistant, but the patient feels it would be useless half of the time. The adult female notes the patient has Zaroxolyn- metolazone 5 mg at home; however, she did not take it on 05/24/2022 due to being uncertain if she needs to take it with her potassium. She did take Zaroxolyn-metolazone 2.5 mg on 05/23/2022. They inquire about the patient taking a different medication. The patient states that she is taking Ramipril once daily.    The adult female states that she has not picked them up for the patient yet because [facility] questioned the dosage. She adds she had to contact our office to get the matter cleared up. [facility] did tell her that the medication would be ready on 05/27/2022.       Patient Active Problem List   Diagnosis   • Irritable bowel syndrome with constipation and diarrhea   • Chronic cystitis   • Benign essential hypertension   • ASHD (arteriosclerotic heart disease)   • Moderate COPD (chronic obstructive pulmonary disease) ([facility])   • Acquired hypothyroidism   • Primary osteoarthritis involving multiple joints   • Ischemic heart disease   • Gastroesophageal reflux disease without esophagitis   • Arteriosclerosis of coronary artery bypass graft   • Asthenia   • Chronic UTI   • CHF (congestive heart failure) ([facility])   • Femoral hernia   • First degree atrioventricular  block   • Heart disorder   • Herpes zoster   • Hyponatremia   • Iron deficiency anemia   • Low blood pressure   • Mixed hyperlipidemia   • Osteoarthritis   • Parasomnia   • Urethritis   • Paroxysmal atrial fibrillation ([facility])   • Acute on chronic respiratory failure with hypoxia ([facility])   • Lumbar pain   • Medicare annual wellness visit, subsequent   • COVID-19 virus infection   • Skin lesion of right leg   • Localized edema        Past Medical History:   Diagnosis Date   • Arthritis    • Cancer, skin, squamous cell 12/27/2012   • Colon polyps 1987    clear 1994   • COPD (chronic obstructive pulmonary disease) ([facility])    • Family history of coronary artery disease    • Family history of hyperlipidemia    • Family history of hypertension    • Family history of stroke    • Femoral hernia of right side 12/13/2011   • Hyperlipidemia    • Hypertension    • Hypothyroidism    • Osteoarthritis    • Osteoporosis    • Pneumonia    • Rectal fistula     repair   • SBO (small bowel obstruction) ([facility]) 09/13/2012    Had right inguinal hernia repair   • Sepsis ([facility]) 04/2014    - Drug therapy    • Stress incontinence     A and P  repair   • Thyroid disease        Past Surgical History:   Procedure Laterality Date   • ANAL FISTULA REPAIR     • CARDIAC SURGERY  10/2011    stents placed   • CATARACT EXTRACTION     • CORONARY ANGIOPLASTY WITH STENT PLACEMENT  11/2011    x3   • HYSTERECTOMY     • INGUINAL HERNIA REPAIR     • OTHER SURGICAL HISTORY      thyroid uptake   • VAGINAL VAULT SUSPENSION         Family History   Problem Relation Age of Onset   • Stroke Mother    • Heart disease Father    • Hypertension Brother    • Diabetes Brother    • Diabetes Daughter    • Heart disease Daughter    • Hypertension Son    • Hypertension Other    • Coronary artery disease Other    • Stroke Other    • Hyperlipidemia Other    • Other Other         ichthyosis       Social History     Socioeconomic History   • Marital status:    Tobacco Use   •  "Smoking status: Former Smoker   • Smokeless tobacco: Former User   • Tobacco comment: quit at approx age 60   Substance and Sexual Activity   • Alcohol use: Yes     Comment: rarely   • Drug use: No   • Sexual activity: Defer       Allergies   Allergen Reactions   • Acetaminophen Unknown (See Comments)     Darvocet- N100   • Baycol [Cerivastatin] Unknown (See Comments)     unknown   • Ciprofloxacin Unknown (See Comments)     Unknown   • Ciprofloxacin Hcl Unknown (See Comments)     unknown   • Codeine Unknown (See Comments)     unknown   • Fosamax [Alendronate Sodium] Unknown (See Comments)     unknown   • Meperidine Unknown (See Comments)     unknown   • Methadone Unknown (See Comments)     unknown   • Morphine Unknown (See Comments)     unknown   • Naloxone Unknown (See Comments)     unknown   • Propoxyphene Unknown (See Comments)     darvocet-n 100   • Sulfa Antibiotics Unknown (See Comments)     unknown   • Ubidecarenone GI Intolerance   • Zocor [Simvastatin] Unknown (See Comments)     unknown       Review of Systems     HEENT: Denies any hearing changes, eyesight changes, no headache or dizziness  NECK: Denies any pain, stiffness, swelling or dysphagia. Positive vascular noises in carotid arteries  CHEST: Denies cough or wheeze or recent lung infections. Congestion at base of lungs, left greater than right  CARDIAC: Denies chest pain, pressure or palpitations  ABD: Denies nausea, vomiting or abdominal pain  : Denies dysuria  NEURO: Denies syncope, concussion, neuropathy  PSYCH: Denies any stress  EXTREM: Denies arthritic changes myalgia or arthralgia  SKIN: Denies any rash complains of weeping edema left greater than right leg  VITALS: Her blood pressure today is 110/70 mmHg with a heart rate of 70 bpm.    Vital Signs  Vitals:    05/25/22 1341   BP: 112/60   BP Location: Left arm   Patient Position: Sitting   Cuff Size: Adult   Pulse: 64   Weight: 62.3 kg (137 lb 6.4 oz)   Height: 165.1 cm (65\")   PainSc: 0-No " pain     Body mass index is 22.86 kg/m².  BMI is within normal parameters. No other follow-up for BMI required.     Advance Care Planning   ACP discussion was held with the patient during this visit. Patient has an advance directive in EMR which is still valid.        Current Outpatient Medications:   •  apixaban (ELIQUIS) 2.5 MG tablet tablet, Take 1 tablet by mouth 2 (Two) Times a Day., Disp: 60 tablet, Rfl: 11  •  atorvastatin (LIPITOR) 40 MG tablet, TAKE 1 TABLET BY MOUTH EVERY DAY, Disp: 90 tablet, Rfl: 2  •  calcium carbonate-vitamin d 600-400 MG-UNIT per tablet, Take 1 tablet by mouth Daily., Disp: , Rfl:   •  carvedilol (Coreg) 6.25 MG tablet, Take 1 tablet by mouth 2 (Two) Times a Day With Meals., Disp: 180 tablet, Rfl: 3  •  furosemide (LASIX) 40 MG tablet, TAKE 1 TABLET BY MOUTH EVERY DAY, Disp: 90 tablet, Rfl: 3  •  gabapentin (NEURONTIN) 300 MG capsule, TAKE 1 CAPSULE BY MOUTH TWICE A DAY, Disp: 180 capsule, Rfl: 0  •  KLOR-CON 10 MEQ CR tablet, TAKE 1 TABLET BY MOUTH EVERY DAY, Disp: 90 tablet, Rfl: 3  •  levothyroxine (SYNTHROID, LEVOTHROID) 50 MCG tablet, Note chg dose to 50 mcg from 75, Disp: 90 tablet, Rfl: 3  •  liothyronine (CYTOMEL) 5 MCG tablet, TAKE 1 TABLET BY MOUTH EVERY DAY, Disp: 90 tablet, Rfl: 1  •  metOLazone (ZAROXOLYN) 2.5 MG tablet, Take 5 mg daily for 5 days then resume 2.5 mg daily, Disp: 15 tablet, Rfl: 1  •  Multiple Vitamins-Minerals (CENTRUM SILVER PO), Take 1 tablet by mouth Daily., Disp: , Rfl:   •  O2 (OXYGEN), Inhale 3 L/min Every Night. Takes 3 l every night, Disp: , Rfl:   •  pantoprazole (PROTONIX) 40 MG EC tablet, Take 1 tablet by mouth Daily., Disp: 90 tablet, Rfl: 1  •  polycarbophil 625 MG tablet tablet, Take 625 mg by mouth Daily., Disp: , Rfl:   •  Propylene Glycol (Systane Complete) 0.6 % solution, Apply 1 drop to eye(s) as directed by provider Daily As Needed., Disp: , Rfl:   •  trimethoprim (TRIMPEX) 100 MG tablet, TAKE 1 TABLET BY MOUTH EVERY DAY, Disp: 90  tablet, Rfl: 3  •  cefdinir (OMNICEF) 300 MG capsule, Take 1 capsule by mouth Daily., Disp: 10 capsule, Rfl: 0    Physical Exam     ACE III MINI         HEENT: Pupils equal reactive ENT clear, no facial asymmetry, pharynx is clear  NECK: No masses bruit or thyromegaly  CHEST: Clear without rales wheezes or rhonchi  CARDIAC: Regular rhythm without gallop rub or click, blood pressure heart rate stable. Positive murmur  ABD: Liver spleen normal, good bowel sounds, nontender  : Deferred  NEURO: Intact  PSYCH: Normal  EXTREM: No significant arthritic changes, positive edema severe left and right lower leg with weeping of the left leg  SKIN: Clear     Results Review:    Recent Results (from the past 672 hour(s))   Comprehensive Metabolic Panel    Collection Time: 05/20/22  2:57 PM    Specimen: Blood   Result Value Ref Range    Glucose 134 (H) 65 - 99 mg/dL    BUN 35 (H) 8 - 23 mg/dL    Creatinine 1.63 (H) 0.57 - 1.00 mg/dL    Sodium 139 136 - 145 mmol/L    Potassium 4.4 3.5 - 5.2 mmol/L    Chloride 98 98 - 107 mmol/L    CO2 27.2 22.0 - 29.0 mmol/L    Calcium 8.8 8.2 - 9.6 mg/dL    Total Protein 5.8 (L) 6.0 - 8.5 g/dL    Albumin 3.50 3.50 - 5.20 g/dL    ALT (SGPT) 23 1 - 33 U/L    AST (SGOT) 33 (H) 1 - 32 U/L    Alkaline Phosphatase 94 39 - 117 U/L    Total Bilirubin 0.8 0.0 - 1.2 mg/dL    Globulin 2.3 gm/dL    A/G Ratio 1.5 g/dL    BUN/Creatinine Ratio 21.5 7.0 - 25.0    Anion Gap 13.8 5.0 - 15.0 mmol/L    eGFR 29.5 (L) >60.0 mL/min/1.73   BNP    Collection Time: 05/20/22  2:57 PM    Specimen: Blood   Result Value Ref Range    proBNP 10,069.0 (H) 0.0 - 1,800.0 pg/mL   CBC Auto Differential    Collection Time: 05/20/22  2:57 PM    Specimen: Blood   Result Value Ref Range    WBC 6.62 3.40 - 10.80 10*3/mm3    RBC 3.12 (L) 3.77 - 5.28 10*6/mm3    Hemoglobin 10.4 (L) 12.0 - 15.9 g/dL    Hematocrit 31.9 (L) 34.0 - 46.6 %    .2 (H) 79.0 - 97.0 fL    MCH 33.3 (H) 26.6 - 33.0 pg    MCHC 32.6 31.5 - 35.7 g/dL    RDW 13.7  12.3 - 15.4 %    RDW-SD 50.1 37.0 - 54.0 fl    MPV 10.2 6.0 - 12.0 fL    Platelets 267 140 - 450 10*3/mm3    Neutrophil % 75.8 42.7 - 76.0 %    Lymphocyte % 12.1 (L) 19.6 - 45.3 %    Monocyte % 10.7 5.0 - 12.0 %    Eosinophil % 0.5 0.3 - 6.2 %    Basophil % 0.6 0.0 - 1.5 %    Immature Grans % 0.3 0.0 - 0.5 %    Neutrophils, Absolute 5.02 1.70 - 7.00 10*3/mm3    Lymphocytes, Absolute 0.80 0.70 - 3.10 10*3/mm3    Monocytes, Absolute 0.71 0.10 - 0.90 10*3/mm3    Eosinophils, Absolute 0.03 0.00 - 0.40 10*3/mm3    Basophils, Absolute 0.04 0.00 - 0.20 10*3/mm3    Immature Grans, Absolute 0.02 0.00 - 0.05 10*3/mm3    nRBC 0.0 0.0 - 0.2 /100 WBC     Procedures    Medication Review: Medications reviewed and noted    Patient wellness counseling  Exercise: Encouraged rest and leg elevation  Diet: Encouraged healthy cardiac diet and reduce salt  Screening: We will obtain CBC and CMP prior to next visit  Social History     Socioeconomic History   • Marital status:    Tobacco Use   • Smoking status: Former Smoker   • Smokeless tobacco: Former User   • Tobacco comment: quit at approx age 60   Substance and Sexual Activity   • Alcohol use: Yes     Comment: rarely   • Drug use: No   • Sexual activity: Defer        Assessment/Plan:    Problem List Items Addressed This Visit        Cardiac and Vasculature    Benign essential hypertension    Overview     History of essential hypertension with current blood pressure 120/70 on carvedilol 6.25 twice daily Lasix 20 mg daily blood pressure is in the low normal range the patient is asymptomatic denying headache dizzy or lightheadedness and no falls we will continue current therapy.           Current Assessment & Plan       Blood pressure 112/60 on carvedilol 6.25 mg twice daily Lasix 40 mg every day continue therapy           Relevant Medications    carvedilol (Coreg) 6.25 MG tablet    furosemide (LASIX) 40 MG tablet    metOLazone (ZAROXOLYN) 2.5 MG tablet    CHF (congestive heart  failure) ([facility])    Overview     History of chronic heart failure improved on carvedilol Eliquis Lipitor Lasix and O2           Current Assessment & Plan     She will continue with the Zaroxolyn-metolazone 5mg daily for 5 days, then 2.5 mg daily.  She will continue Lasix 40 mg daily.  She will return for labs and doctors visit in 1 week. She does not need them today.   She will use the antibiotic starting tomorrow.           Relevant Medications    carvedilol (Coreg) 6.25 MG tablet    Mixed hyperlipidemia    Overview     History of mixed hyperlipidemia on atorvastatin 40 mg daily denies myalgia arthralgia           Current Assessment & Plan       Mixed hyperlipidemia on atorvastatin 40 mg daily denies myalgia arthralgia continue therapy           Relevant Medications    atorvastatin (LIPITOR) 40 MG tablet       Endocrine and Metabolic    Acquired hypothyroidism    Overview     History of hypothyroidism on Synthroid 75 MCG daily patient's TSH is low will reduce the Synthroid to 50 MCG daily and continue the Cytomel at 5 mg daily           Current Assessment & Plan     Hypothyroidism on levothyroxine 50 MCG daily and Cytomel 5 MCG daily continue therapy           Relevant Medications    levothyroxine (SYNTHROID, LEVOTHROID) 50 MCG tablet    liothyronine (CYTOMEL) 5 MCG tablet    carvedilol (Coreg) 6.25 MG tablet       Pulmonary and Pneumonias    Moderate COPD (chronic obstructive pulmonary disease) ([facility])    Overview     History of mild COPD on O2 at night           Current Assessment & Plan       On oxygen 24/7 continue therapy                Symptoms and Signs    Localized edema - Primary    Overview     Patient has chronic edema on Lasix 40 mg daily and as needed Zaroxolyn 2.5 mg once per week has developed severe congestive heart failure and bilateral leg edema with weight of 148 pounds 5 days ago she was given metolazone 2.5 mg daily and is lost 11 pounds down to 137 pounds still has weeping edema of her lower legs  and some in her thighs,           Current Assessment & Plan     We will continue the Lasix at 40 mg daily, and not changed to Bumex at this time, and add or change the metolazone to 5 mg daily for 5 days and then resume at 2.5 mg daily with return visit in 1 week and will obtain CBC and CMP at that time.                  Patient Instructions   Has diuresed 11 pounds in the past 6 days May 20 through May 25 on the increased dose of metolazone 2.5 mg daily  Will increase the metolazone to 5 mg daily along with the Lasix of 40 mg daily for 5 days and then reduce the metolazone to 2.5 mg thereafter along with the Lasix of 40 mg daily  Continue all other medications  Continue O2 with current saturation of 90% on O2 2 L  Low-salt diet  Elevate legs  Return visit in 1 week or as needed  Do take the cefdinir for the bladder infection she will start this day.       Plan of care reviewed with patient at the conclusion of today's visit. Education was provided regarding diagnosis, management, and any prescribed or recommended OTC medications.Patient verbalizes understanding of and agreement with management plan.         Nj Mckeon MD      Note: Part of this note may be an electronic transcription/translation of spoken language to printed text using the Dragon Dictation system.      Transcribed from ambient dictation for Nj Mckeon MD by Ivania Nielsen  05/26/22   09:02 EDT    Patient verbalized consent to the visit recording.  I have personally performed the services described in this document as transcribed by the above individual, and it is both accurate and complete.  Nj Mckeon MD  5/26/2022  12:30 EDT

## 2022-05-25 NOTE — ASSESSMENT & PLAN NOTE
We will continue the Lasix at 40 mg daily, and not changed to Bumex at this time, and add or change the metolazone to 5 mg daily for 5 days and then resume at 2.5 mg daily with return visit in 1 week and will obtain CBC and CMP at that time.

## 2022-05-25 NOTE — PATIENT INSTRUCTIONS
Has diuresed 11 pounds in the past 6 days May 20 through May 25 on the increased dose of metolazone 2.5 mg daily  Will increase the metolazone to 5 mg daily along with the Lasix of 40 mg daily for 5 days and then reduce the metolazone to 2.5 mg thereafter along with the Lasix of 40 mg daily  Continue all other medications  Continue O2 with current saturation of 90% on O2 2 L  Low-salt diet  Elevate legs  Return visit in 1 week or as needed  Do take the cefdinir for the bladder infection she will start this day.

## 2022-05-26 NOTE — ASSESSMENT & PLAN NOTE
She will continue with the Zaroxolyn-metolazone 5mg daily for 5 days, then 2.5 mg daily.  She will continue Lasix 40 mg daily.  She will return for labs and doctors visit in 1 week. She does not need them today.   She will use the antibiotic starting tomorrow.

## 2022-06-01 NOTE — TELEPHONE ENCOUNTER
"I spoke with patient's daughter.  She said patient was seen by podiatrist yesterday and was told that her skin is in \"awful shape\" and the incorrect dressings are being used.  Podiatrist recommends home health for lower extremity dressing changes to include ABD pads for exudate, 4\" coban, and 4\" kerlix.  Patient requesting [facility] home [facility].    Daughter states the edema is better but the skin looks terrible.  Requesting that this be evaluated at tomorrow's office visit follow up with us.   "

## 2022-06-01 NOTE — TELEPHONE ENCOUNTER
I tried to call [employer] ([phone]).  They have closed for the day.  I will call again tomorrow

## 2022-06-01 NOTE — TELEPHONE ENCOUNTER
PATIENT IN AN ASSISTED LIVING AND HAD SEVERE LEG LEAKING. PODIATRIST STATES THE PATIENT NEEDS HOME HEALTH FOR SPECIAL BANDAGING. PATIENT HAS AN APPOINTMENT 06/02/22 BUT WANTING TO INFORM DR DORSEY HOME HEALTH WILL BE REQUIRED 3 TIMES A WEEK. PATIENT DOES NOT WANT CARE TENDERS [facility].     CALL BACK [phone]

## 2022-06-02 NOTE — PROGRESS NOTES
[facility]     Amparo Valdes  2/3/1930   [id number]      Patient Care Team:  Nj Mckeon MD as PCP - General  Nj Mckeon MD as PCP - Family Medicine  Harjeet Martinez MD as Consulting Physician (Interventional Cardiology)  Dwight De Souza MD as Consulting Physician (Urology)    Chief Complaint::   Chief Complaint   Patient presents with   • Edema     1 week follow up            HPI        Amparo Valdes is a 89-year-old female who presents with atrial fibrillation, mixed hyperlipidemia, hypotension with hypothyroidism, and chronic lower leg edema that is now oozing. The patient's weight has increased from 137 pounds to 140 pounds in the past 1 week. She is accompanied by her daughter.    The patient complains of bilateral lower leg oozing sores left and right with poorly controlled edema she did not take her metolazone this day but did take Lasix she complains of her leg swelling and bruising she is recently seen podiatry and has suggested cutting wounds with ABD pads Coban and Kerlix and suggested home health family would like [facility] home health and we will arrange.    The patient states that she is not feeling well. She notes that she is not walking very much, but some seem to be kept busy doing a little bit of everything else. She reports that she has been trying to go to her home once a week, but not always once a week. She states that they stay there 1.5 to 2 hours. She notes that the wound under the back of her left leg is the big one. She notes that it has been weeping. She reports that she has been wrapping it with antibiotic Band-Aid. She states that her big toe is okay, but it hurts. She notes that she has been draining through very thick pads. She reports that she is doing everything about the toe. She states that she would like to leave it on for 2 days even if it gets drenched from drainage. She notes that she has been excreting so much fluid that they are needing to change  it twice a day. She states that she is drenching her bed every night even though they put down pads. She states that she is not drenching with urine.    She reports that she is feeling alright when she is out of way home to eat her meals. She states that she has a cup of coffee. She notes that she goes into the dining room except for breakfast. She states that they did not help her into her wheelchair. She notes that she can walk with a rollator.    She reports that her bowels are moving regularly. She states that she has much gas. She notes that she has some ascites in her abdomen as well as her legs. She states that she finished the antibiotic for her bladder infection. She reports that she uses trimethoprim daily. She notes that she is still on that. She states that she does not think she finishes it until this weekend. She notes that she is taking Lasix every day. She reports that she just started on metolazone 2.5 mg every day. She states that she thought she thought it was enough diuretic. She notes that she needs to stay in bed with her legs up. She reports that she is trying to live a more normal life but she is not able to do that right now. She states that she does not want her going to the dining room. She notes that she has her blood works done so that she can see where her kidney function is. She states that she has already done that test before they came up. She notes that she would like home health. She reports that she has a friend dying of cancer. She notes that she is on hospice.      Patient Active Problem List   Diagnosis   • Irritable bowel syndrome with constipation and diarrhea   • Chronic cystitis   • Benign essential hypertension   • ASHD (arteriosclerotic heart disease)   • Moderate COPD (chronic obstructive pulmonary disease) ([facility])   • Acquired hypothyroidism   • Primary osteoarthritis involving multiple joints   • Ischemic heart disease   • Gastroesophageal reflux disease without  esophagitis   • Arteriosclerosis of coronary artery bypass graft   • Asthenia   • Chronic UTI   • CHF (congestive heart failure) ([facility])   • Femoral hernia   • First degree atrioventricular block   • Heart disorder   • Herpes zoster   • Hyponatremia   • Iron deficiency anemia   • Low blood pressure   • Mixed hyperlipidemia   • Osteoarthritis   • Parasomnia   • Urethritis   • Paroxysmal atrial fibrillation ([facility])   • Acute on chronic respiratory failure with hypoxia ([facility])   • Lumbar pain   • Medicare annual wellness visit, subsequent   • COVID-19 virus infection   • Skin lesion of right leg   • Localized edema        Past Medical History:   Diagnosis Date   • Arthritis    • Cancer, skin, squamous cell 12/27/2012   • Colon polyps 1987    clear 1994   • COPD (chronic obstructive pulmonary disease) ([facility])    • Family history of coronary artery disease    • Family history of hyperlipidemia    • Family history of hypertension    • Family history of stroke    • Femoral hernia of right side 12/13/2011   • Hyperlipidemia    • Hypertension    • Hypothyroidism    • Osteoarthritis    • Osteoporosis    • Pneumonia    • Rectal fistula     repair   • SBO (small bowel obstruction) ([facility]) 09/13/2012    Had right inguinal hernia repair   • Sepsis ([facility]) 04/2014    - Drug therapy    • Stress incontinence     A and P  repair   • Thyroid disease        Past Surgical History:   Procedure Laterality Date   • ANAL FISTULA REPAIR     • CARDIAC SURGERY  10/2011    stents placed   • CATARACT EXTRACTION     • CORONARY ANGIOPLASTY WITH STENT PLACEMENT  11/2011    x3   • HYSTERECTOMY     • INGUINAL HERNIA REPAIR     • OTHER SURGICAL HISTORY      thyroid uptake   • VAGINAL VAULT SUSPENSION         Family History   Problem Relation Age of Onset   • Stroke Mother    • Heart disease Father    • Hypertension Brother    • Diabetes Brother    • Diabetes Daughter    • Heart disease Daughter    • Hypertension Son    • Hypertension Other    • Coronary artery  disease Other    • Stroke Other    • Hyperlipidemia Other    • Other Other         ichthyosis       Social History     Socioeconomic History   • Marital status:    Tobacco Use   • Smoking status: Former Smoker   • Smokeless tobacco: Former User   • Tobacco comment: quit at approx age 60   Substance and Sexual Activity   • Alcohol use: Yes     Comment: rarely   • Drug use: No   • Sexual activity: Defer       Allergies   Allergen Reactions   • Acetaminophen Unknown (See Comments)     Darvocet- N100   • Baycol [Cerivastatin] Unknown (See Comments)     unknown   • Ciprofloxacin Unknown (See Comments)     Unknown   • Ciprofloxacin Hcl Unknown (See Comments)     unknown   • Codeine Unknown (See Comments)     unknown   • Fosamax [Alendronate Sodium] Unknown (See Comments)     unknown   • Meperidine Unknown (See Comments)     unknown   • Methadone Unknown (See Comments)     unknown   • Morphine Unknown (See Comments)     unknown   • Naloxone Unknown (See Comments)     unknown   • Propoxyphene Unknown (See Comments)     darvocet-n 100   • Sulfa Antibiotics Unknown (See Comments)     unknown   • Ubidecarenone GI Intolerance   • Zocor [Simvastatin] Unknown (See Comments)     unknown       Review of Systems   HEENT: Denies any hearing changes, eyesight changes, no headache or dizziness   NECK:  Denies any pain, stiffness, swelling or dysphagia   CHEST: Denies cough or wheeze or recent lung infections does complain of shortness of breath and is on O2 24/7 CARDIAC: Denies chest pain, pressure or palpitations   ABD: Denies nausea, vomiting or abdominal pain, reports fluid   : Denies dysuria recent urinary tract infection treated and resolved  NEURO:  Denies syncope, concussion, neuropathy  PSYCH: Denies any stress   EXTREM: Denies arthritic changes myalgia or arthralgia, reports pain in big toe, reports fluid in her legs   SKIN: Denies any rash is oozing erythematous sores on both lower legs      Vital Signs  Vitals:     "06/02/22 1524   BP: (!) 80/50   BP Location: Right arm   Patient Position: Sitting   Cuff Size: Adult   Pulse: 80   Weight: 63.5 kg (140 lb)   Height: 165.1 cm (65\")   PainSc: 0-No pain     Body mass index is 23.3 kg/m².  BMI is within normal parameters. No other follow-up for BMI required.     Advance Care Planning   ACP discussion was held with the patient during this visit. Patient has an advance directive in EMR which is still valid.        Current Outpatient Medications:   •  apixaban (ELIQUIS) 2.5 MG tablet tablet, Take 1 tablet by mouth 2 (Two) Times a Day., Disp: 60 tablet, Rfl: 11  •  atorvastatin (LIPITOR) 40 MG tablet, TAKE 1 TABLET BY MOUTH EVERY DAY, Disp: 90 tablet, Rfl: 2  •  calcium carbonate-vitamin d 600-400 MG-UNIT per tablet, Take 1 tablet by mouth Daily., Disp: , Rfl:   •  carvedilol (Coreg) 6.25 MG tablet, Take 1 tablet by mouth 2 (Two) Times a Day With Meals., Disp: 180 tablet, Rfl: 3  •  cefdinir (OMNICEF) 300 MG capsule, Take 1 capsule by mouth Daily., Disp: 10 capsule, Rfl: 0  •  furosemide (LASIX) 40 MG tablet, TAKE 1 TABLET BY MOUTH EVERY DAY, Disp: 90 tablet, Rfl: 3  •  gabapentin (NEURONTIN) 300 MG capsule, TAKE 1 CAPSULE BY MOUTH TWICE A DAY, Disp: 180 capsule, Rfl: 0  •  KLOR-CON 10 MEQ CR tablet, TAKE 1 TABLET BY MOUTH EVERY DAY, Disp: 90 tablet, Rfl: 3  •  levothyroxine (SYNTHROID, LEVOTHROID) 50 MCG tablet, Note chg dose to 50 mcg from 75, Disp: 90 tablet, Rfl: 3  •  liothyronine (CYTOMEL) 5 MCG tablet, TAKE 1 TABLET BY MOUTH EVERY DAY, Disp: 90 tablet, Rfl: 1  •  metOLazone (ZAROXOLYN) 2.5 MG tablet, Take 5 mg daily for 5 days then resume 2.5 mg daily, Disp: 15 tablet, Rfl: 1  •  Multiple Vitamins-Minerals (CENTRUM SILVER PO), Take 1 tablet by mouth Daily., Disp: , Rfl:   •  O2 (OXYGEN), Inhale 3 L/min Every Night. Takes 3 l every night, Disp: , Rfl:   •  pantoprazole (PROTONIX) 40 MG EC tablet, Take 1 tablet by mouth Daily., Disp: 90 tablet, Rfl: 1  •  polycarbophil 625 MG tablet " tablet, Take 625 mg by mouth Daily., Disp: , Rfl:   •  Propylene Glycol (Systane Complete) 0.6 % solution, Apply 1 drop to eye(s) as directed by provider Daily As Needed., Disp: , Rfl:   •  trimethoprim (TRIMPEX) 100 MG tablet, TAKE 1 TABLET BY MOUTH EVERY DAY, Disp: 90 tablet, Rfl: 3    Physical Exam   HEENT: Pupils equal reactive ENT clear, no facial asymmetry, pharynx is clear  NECK:  No masses bruit or thyromegaly  CHEST: Clear without rales wheezes or rhonchi  CARDIAC: Regular rhythm without gallop rub or click, blood pressure heart rate stable.   ABD: Liver spleen normal, good bowel sounds, nontender  : Deferred  NEURO: Intact   PSYCH: Normal  EXTREM: No significant arthritic changes, no edema.   SKIN: Posterior left leg 1 x 2 cm on the anterior left leg, 3 spots of about a centimeter medial left leg. On the right leg, anterior length is L1x2 cm. Severe edema. Right third toe has a little macerated area of about 0.5 cm about 0.5 cm on the distal portion of the right third toe.  VITALS: Blood pressure during the exam is 100/70 mmHg.         Results Review:    Recent Results (from the past 672 hour(s))   Comprehensive Metabolic Panel    Collection Time: 05/20/22  2:57 PM    Specimen: Blood   Result Value Ref Range    Glucose 134 (H) 65 - 99 mg/dL    BUN 35 (H) 8 - 23 mg/dL    Creatinine 1.63 (H) 0.57 - 1.00 mg/dL    Sodium 139 136 - 145 mmol/L    Potassium 4.4 3.5 - 5.2 mmol/L    Chloride 98 98 - 107 mmol/L    CO2 27.2 22.0 - 29.0 mmol/L    Calcium 8.8 8.2 - 9.6 mg/dL    Total Protein 5.8 (L) 6.0 - 8.5 g/dL    Albumin 3.50 3.50 - 5.20 g/dL    ALT (SGPT) 23 1 - 33 U/L    AST (SGOT) 33 (H) 1 - 32 U/L    Alkaline Phosphatase 94 39 - 117 U/L    Total Bilirubin 0.8 0.0 - 1.2 mg/dL    Globulin 2.3 gm/dL    A/G Ratio 1.5 g/dL    BUN/Creatinine Ratio 21.5 7.0 - 25.0    Anion Gap 13.8 5.0 - 15.0 mmol/L    eGFR 29.5 (L) >60.0 mL/min/1.73   BNP    Collection Time: 05/20/22  2:57 PM    Specimen: Blood   Result Value Ref  Range    proBNP 10,069.0 (H) 0.0 - 1,800.0 pg/mL   CBC Auto Differential    Collection Time: 05/20/22  2:57 PM    Specimen: Blood   Result Value Ref Range    WBC 6.62 3.40 - 10.80 10*3/mm3    RBC 3.12 (L) 3.77 - 5.28 10*6/mm3    Hemoglobin 10.4 (L) 12.0 - 15.9 g/dL    Hematocrit 31.9 (L) 34.0 - 46.6 %    .2 (H) 79.0 - 97.0 fL    MCH 33.3 (H) 26.6 - 33.0 pg    MCHC 32.6 31.5 - 35.7 g/dL    RDW 13.7 12.3 - 15.4 %    RDW-SD 50.1 37.0 - 54.0 fl    MPV 10.2 6.0 - 12.0 fL    Platelets 267 140 - 450 10*3/mm3    Neutrophil % 75.8 42.7 - 76.0 %    Lymphocyte % 12.1 (L) 19.6 - 45.3 %    Monocyte % 10.7 5.0 - 12.0 %    Eosinophil % 0.5 0.3 - 6.2 %    Basophil % 0.6 0.0 - 1.5 %    Immature Grans % 0.3 0.0 - 0.5 %    Neutrophils, Absolute 5.02 1.70 - 7.00 10*3/mm3    Lymphocytes, Absolute 0.80 0.70 - 3.10 10*3/mm3    Monocytes, Absolute 0.71 0.10 - 0.90 10*3/mm3    Eosinophils, Absolute 0.03 0.00 - 0.40 10*3/mm3    Basophils, Absolute 0.04 0.00 - 0.20 10*3/mm3    Immature Grans, Absolute 0.02 0.00 - 0.05 10*3/mm3    nRBC 0.0 0.0 - 0.2 /100 WBC     Procedures    Medication Review: Medications reviewed and noted    Patient wellness counseling  Exercise: Encouraged rest and leg elevation  Diet: Encouraged healthy cardiac diet no salt  Screening: CBC and CMP are pending drawn earlier this day  Social History     Socioeconomic History   • Marital status:    Tobacco Use   • Smoking status: Former Smoker   • Smokeless tobacco: Former User   • Tobacco comment: quit at approx age 60   Substance and Sexual Activity   • Alcohol use: Yes     Comment: rarely   • Drug use: No   • Sexual activity: Defer        Assessment/Plan:    Problem List Items Addressed This Visit        Cardiac and Vasculature    CHF (congestive heart failure) ([facility])    Overview     History of chronic heart failure improved on carvedilol Eliquis Lipitor Lasix and O2           Current Assessment & Plan       Patient's chest has a few crackles her cardiac  status reveals low range blood pressure initially of 80/50 repeated at 100/60 with a heart rate in the low 80s no gallop or rub heart rate is slightly irregular patient is on Eliquis 2.5 mg twice daily atorvastatin 40 carvedilol 6.25 twice daily Lasix 40 mg daily and Zaroxolyn 2.5 mg prescribed daily CBC and CMP lab are pending and encouraged patient to take the Zaroxolyn 2.5 daily           Relevant Medications    carvedilol (Coreg) 6.25 MG tablet    Mixed hyperlipidemia    Overview     History of mixed hyperlipidemia on atorvastatin 40 mg daily denies myalgia arthralgia           Current Assessment & Plan       Mixed hyperlipidemia and coronary disease on atorvastatin 40 mg daily continue therapy           Relevant Medications    atorvastatin (LIPITOR) 40 MG tablet       Skin    Skin lesion of right leg    Overview     Patient has noticed a progressive growth right medial thigh now approximately 1 cm in diameter with crusting and thickening likely carcinoma will refer to dermatology is seen Dr. Rick eBrry in the past           Current Assessment & Plan     Patient has oozing lesions from the severe edema of the lower legs both left and right several areas on the left and several areas on the right there is serous ooze is clear the individual wounds are covered with Telfa Kerlix is applied an Ace wrap is applied a bandage is placed on the right third toe tip.  Have encouraged the patient to stay at rest with legs elevated at least 2 hip height and take the Lasix 40 mg daily and the Zaroxolyn 2.5 mg daily pending today's lab of CBC and CMP for creatinine kidney levels.  Will arrange home health hopefully on a daily basis for bandage changes using Telfa ABD pad Curlex wrap and Coban or Ace wrap  Have encouraged daughter to obtain 24/7 assistance for the patient while at the personal-care home and for the patient not to travel to her home [place] [address]              Symptoms and Signs    Localized edema -  Primary    Overview     Patient has chronic edema on Lasix 40 mg daily and as needed Zaroxolyn 2.5 mg once per week has developed severe congestive heart failure and bilateral leg edema with weight of 148 pounds 5 days ago she was given metolazone 2.5 mg daily and is lost 11 pounds down to 137 pounds still has weeping edema of her lower legs and some in her thighs,           Current Assessment & Plan     Patient has rather severe bilateral lower leg edema with oozing ulcer source left and right she has been on Lasix 40 mg every day and metolazone 2.5 mg daily but has not taken the metolazone this day.  The patient is ambulatory with assistance and is in the office today utilizing wheelchair she is on oxygen 24/7  The leg wounds are clean slightly using covered with Telfa Curlex and Ace wrap  I do not believe the patient would be able to wear MITRA house this is losing edema                  Patient Instructions   Continue all current medicines and take the metolazone 2.5 mg daily  Leg wounds both left and right are covered with Telfa then Kerlex wrap and Ace wrap  Bandages placed on the right third toe  Arrange [facility] home health to change dressings on a daily basis with ABD pads with Coban wrapping of both legs  Spoke with daughter and she will try to arrange 24-day supervision and care of patient  Return office visit in 2 weeks    -She will continue current medications.  -I recommended having someone who can take care of her 24 hours a day for a few weeks at least.  -She is to return to the clinic in 2 weeks.    Plan of care reviewed with patient at the conclusion of today's visit. Education was provided regarding diagnosis, management, and any prescribed or recommended OTC medications.Patient verbalizes understanding of and agreement with management plan.         Nj Mckeon MD    Transcribed from ambient dictation for Nj Mckeon MD by Nancy Doll.  06/02/22   18:56 EDT    Patient verbalized consent to  the visit recording.  I have personally performed the services described in this document as transcribed by the above individual, and it is both accurate and complete.  Nj Mckeon MD  6/2/2022  21:05 EDT      Note: Part of this note may be an electronic transcription/translation of spoken language to printed text using the Dragon Dictation system.

## 2022-06-02 NOTE — PATIENT INSTRUCTIONS
Continue all current medicines and take the metolazone 2.5 mg daily  Leg wounds both left and right are covered with Telfa then Kerlex wrap and Ace wrap  Bandages placed on the right third toe  [facility] to change dressings on a daily basis with ABD pads with Coban wrapping of both legs  Spoke with daughter and she will try to arrange 24-day supervision and care of patient  Return office visit in 2 weeks

## 2022-06-02 NOTE — TELEPHONE ENCOUNTER
I called referral to [facility].  They have access to Epic.  They will obtain records, noting that today's office note will not be ready until tomorrow.

## 2022-06-03 NOTE — ASSESSMENT & PLAN NOTE
Patient has oozing lesions from the severe edema of the lower legs both left and right several areas on the left and several areas on the right there is serous ooze is clear the individual wounds are covered with Telfa Kerlix is applied an Ace wrap is applied a bandage is placed on the right third toe tip.  Have encouraged the patient to stay at rest with legs elevated at least 2 hip height and take the Lasix 40 mg daily and the Zaroxolyn 2.5 mg daily pending today's lab of CBC and CMP for creatinine kidney levels.  Will arrange home health hopefully on a daily basis for bandage changes using Telfa ABD pad Curlex wrap and Coban or Ace wrap  Have encouraged daughter to obtain 24/7 assistance for the patient while at the personal-care home and for the patient not to travel to her home on the [facility]

## 2022-06-03 NOTE — ASSESSMENT & PLAN NOTE
Patient's chest has a few crackles her cardiac status reveals low range blood pressure initially of 80/50 repeated at 100/60 with a heart rate in the low 80s no gallop or rub heart rate is slightly irregular patient is on Eliquis 2.5 mg twice daily atorvastatin 40 carvedilol 6.25 twice daily Lasix 40 mg daily and Zaroxolyn 2.5 mg prescribed daily CBC and CMP lab are pending and encouraged patient to take the Zaroxolyn 2.5 daily

## 2022-06-03 NOTE — ASSESSMENT & PLAN NOTE
Patient has rather severe bilateral lower leg edema with oozing ulcer source left and right she has been on Lasix 40 mg every day and metolazone 2.5 mg daily but has not taken the metolazone this day.  The patient is ambulatory with assistance and is in the office today utilizing wheelchair she is on oxygen 24/7  The leg wounds are clean slightly using covered with Telfa Curlex and Ace wrap  I do not believe the patient would be able to wear MITRA house this is losing edema

## 2022-06-06 NOTE — TELEPHONE ENCOUNTER
Rx Refill Note  Requested Prescriptions     Pending Prescriptions Disp Refills   • pantoprazole (PROTONIX) 40 MG EC tablet [Pharmacy Med Name: PANTOPRAZOLE SOD DR 40 MG TAB] 90 tablet 1     Sig: TAKE 1 TABLET BY MOUTH EVERY DAY      Last office visit with prescribing clinician:  6/2/22   Next office visit with prescribing clinician: 6/16/22           Maria R Rodriguez RN  06/06/22, 16:51 EDT

## 2022-06-07 NOTE — TELEPHONE ENCOUNTER
Bouchra, a nurse with [facility], called to ask for some care orders for the patient.    Patient has gained 3 LBS in 2 (137 to 141) days while the patient has been complaining of tightness of her wrapped legs on one calf and the other foot. Legs have been being kept elevated and PT family was told they could loosen the bandages slightly to accommodate for the tightness.    Bouchra wants to know what to do about the weight gain, patient will be finishing her 5 MG metalizone tomorrow before going back to the 2.5 MG. Wanted to know if the patient should be swapped to Furosemide instead of the Metalizone plus a potassium suppliment     Patients antibiotic was completed on 06/02 and a repeat urinalysis was requested on the week of 06/13 to make sure the infection is gone.    Requests standing orders for instruction if patient continues to gain weight in this manner    Call back at [phone]

## 2022-06-08 NOTE — TELEPHONE ENCOUNTER
Stop the Lasix,  Do start the metolazone to 2.5 mg daily from 5 mg  Have added Bumex 2 mg daily in place of the Lasix and sent the prescription to [facility] in [place]  Please inform daughter and home health  I am going to try to come out to see the patient on Saturday

## 2022-06-08 NOTE — TELEPHONE ENCOUNTER
Home health (Bouchra) notified.  She will notify patient and daughter. Understanding is verbalized

## 2022-06-09 NOTE — TELEPHONE ENCOUNTER
Bouchra with HH calls back.  Patient has crackles throughout so portable CXR is ordered.    She is currently getting her hair done and oxygen is off - O2 sat is 84%.   Patient has been taking lasix BID.  Per , patient should start bumex tonight, not take lasix, and then take bumex in the morning.  Bouchra understands and will instruct patient.

## 2022-06-09 NOTE — TELEPHONE ENCOUNTER
I spoke with Bouchra and gave verbal order for labs.  She is doing dressing changes on leg ulcers.  Patient did not  the bumex yesterday, so took lasix 40 mg this am.  Per JH, OK to start the bumex tomorrow (6/10/22).   Per JH, if patient's lungs sound junky today, OK to get portable CXR.  Bouchra notified.

## 2022-06-09 NOTE — TELEPHONE ENCOUNTER
Bouchra with [facility] called has some questions concerning pt's oxygen and wants to know if Dr Mckeon would like her to draw labs. She is on her way to see pt now

## 2022-06-13 NOTE — TELEPHONE ENCOUNTER
Chest x-ray performed June 12/2022 with cardiomegaly and left pleural effusion await actual reading

## 2022-06-13 NOTE — TELEPHONE ENCOUNTER
Portable chest xray today shows:    Left pleural effusion w/better volume collapse  Hyperinflated lungs from emphysema  Large cardiac silhouette

## 2022-06-20 NOTE — TELEPHONE ENCOUNTER
Rx Refill Note  Requested Prescriptions     Pending Prescriptions Disp Refills   • metOLazone (ZAROXOLYN) 2.5 MG tablet [Pharmacy Med Name: METOLAZONE 2.5 MG TABLET] 30 tablet      Sig: TAKE 1 TABLET BY MOUTH EVERY DAY      Last office visit with prescribing clinician: 6/2/2022      Next office visit with prescribing clinician: 7/11/2022            Maria R Rodriguez RN  06/20/22, 11:08 EDT

## 2022-06-21 NOTE — TELEPHONE ENCOUNTER
Rx Refill Note  Requested Prescriptions     Pending Prescriptions Disp Refills   • liothyronine (CYTOMEL) 5 MCG tablet 90 tablet 1     Sig: Take 1 tablet by mouth Daily.      Last office visit with prescribing clinician: 6/2/2022      Next office visit with prescribing clinician: 7/11/2022            Maria R Rodriguez RN  06/21/22, 12:01 EDT

## 2022-07-01 NOTE — TELEPHONE ENCOUNTER
Called talked to patient's daughter Tamiko to see what medication that she wanted sent in she stated patient is having anxiety being in the hospital with the GI bleed and has asked the nurse there for some anxiety medication. Patient has not been on this kind of medication before. Tamiko stated the hospital has not responded to the request and was wanting to know if you would send something to pharmacy she can give her

## 2022-07-01 NOTE — TELEPHONE ENCOUNTER
Not knowing what treatments she is getting in the hospital, it would be potentially dangerous to add any type of anxiety medication.

## 2022-07-01 NOTE — TELEPHONE ENCOUNTER
Caller: Amparo Valdes    Relationship to patient: Self    Best call back number: [phone]    PATIENT'S DAUGHTER STATES THAT SHE HAS BEEN ADMITTED TO [facility] FOR A GI BLEED AND INCONTINENCE.  SHE HAS SEVERE BREATHING PROBLEMS RELATED TO CHF.  SHE HAS ASKED THE HOSPITAL TO GIVE HER ANXIETY MEDICINE, BUT THEY HAVE NOT AUTHORIZED HER REQUEST AS OF YET.  COULD DR DORSEY PRESCRIBE MEDICINE FOR HER AND SEND IT TO [facility]?  PLEASE CALL AND ADVISE.

## 2022-07-19 ENCOUNTER — TELEPHONE (OUTPATIENT)
Dept: INTERNAL MEDICINE | Facility: CLINIC | Age: 87
End: 2022-07-19